# Patient Record
Sex: MALE | Race: WHITE | Employment: FULL TIME | URBAN - METROPOLITAN AREA
[De-identification: names, ages, dates, MRNs, and addresses within clinical notes are randomized per-mention and may not be internally consistent; named-entity substitution may affect disease eponyms.]

---

## 2017-01-13 ENCOUNTER — HOSPITAL ENCOUNTER (OUTPATIENT)
Dept: LAB | Age: 67
Discharge: HOME | End: 2017-01-13
Payer: MEDICARE

## 2017-01-13 ENCOUNTER — OFFICE VISIT (OUTPATIENT)
Dept: INTERNAL MEDICINE CLINIC | Age: 67
End: 2017-01-13

## 2017-01-13 VITALS
TEMPERATURE: 98.3 F | HEIGHT: 70 IN | DIASTOLIC BLOOD PRESSURE: 80 MMHG | SYSTOLIC BLOOD PRESSURE: 133 MMHG | BODY MASS INDEX: 25.05 KG/M2 | HEART RATE: 78 BPM | WEIGHT: 175 LBS | RESPIRATION RATE: 18 BRPM | OXYGEN SATURATION: 96 %

## 2017-01-13 DIAGNOSIS — K57.31 DIVERTICULOSIS OF COLON WITH HEMORRHAGE: Primary | ICD-10-CM

## 2017-01-13 DIAGNOSIS — Z72.0 TOBACCO ABUSE DISORDER: ICD-10-CM

## 2017-01-13 DIAGNOSIS — Z00.00 ROUTINE ADULT HEALTH MAINTENANCE: ICD-10-CM

## 2017-01-13 DIAGNOSIS — R06.02 SOB (SHORTNESS OF BREATH) ON EXERTION: Chronic | ICD-10-CM

## 2017-01-13 PROCEDURE — 36415 COLL VENOUS BLD VENIPUNCTURE: CPT

## 2017-01-13 PROCEDURE — 84153 ASSAY OF PSA TOTAL: CPT

## 2017-01-13 PROCEDURE — 85025 COMPLETE CBC W/AUTO DIFF WBC: CPT

## 2017-01-13 PROCEDURE — 86803 HEPATITIS C AB TEST: CPT

## 2017-01-13 RX ORDER — ASPIRIN 81 MG/1
81 TABLET ORAL DAILY
Qty: 30 TAB | Refills: 12
Start: 2017-01-13 | End: 2019-06-20

## 2017-01-13 RX ORDER — PANTOPRAZOLE SODIUM 40 MG/1
40 TABLET, DELAYED RELEASE ORAL DAILY
COMMUNITY
End: 2017-07-13 | Stop reason: ALTCHOICE

## 2017-01-13 NOTE — PATIENT INSTRUCTIONS
Learning About Benefits From Quitting Smoking  How does quitting smoking make you healthier? If you're thinking about quitting smoking, you may have a few reasons to be smoke-free. Your health may be one of them. · When you quit smoking, you lower your risks for cancer, lung disease, heart attack, stroke, blood vessel disease, and blindness from macular degeneration. · When you're smoke-free, you get sick less often, and you heal faster. You are less likely to get colds, flu, bronchitis, and pneumonia. · As a nonsmoker, you may find that your mood is better and you are less stressed. When and how will you feel healthier? Quitting has real health benefits that start from day 1 of being smoke-free. And the longer you stay smoke-free, the healthier you get and the better you feel. The first hours  · After just 20 minutes, your blood pressure and heart rate go down. That means there's less stress on your heart and blood vessels. · Within 12 hours, the level of carbon monoxide in your blood drops back to normal. That makes room for more oxygen. With more oxygen in your body, you may notice that you have more energy than when you smoked. After 2 weeks  · Your lungs start to work better. · Your risk of heart attack starts to drop. After 1 month  · When your lungs are clear, you cough less and breathe deeper, so it's easier to be active. · Your sense of taste and smell return. That means you can enjoy food more than you have since you started smoking. Over the years  · After 1 year, your risk of heart disease is half what it would be if you kept smoking. · After 5 years, your risk of stroke starts to shrink. Within a few years after that, it's about the same as if you'd never smoked. · After 10 years, your risk of dying from lung cancer is cut by about half. And your risk for many other types of cancer is lower too. How would quitting help others in your life?   When you quit smoking, you improve the health of everyone who now breathes in your smoke. · Their heart, lung, and cancer risks drop, much like yours. · They are sick less. For babies and small children, living smoke-free means they're less likely to have ear infections, pneumonia, and bronchitis. · If you're a woman who is or will be pregnant someday, quitting smoking means a healthier . · Children who are close to you are less likely to become adult smokers. Where can you learn more? Go to http://caprice-erich.info/. Enter [id number] [id number] 72 11 in the search box to learn more about \"Learning About Benefits From Quitting Smoking. \"  Current as of: May 26, 2016  Content Version: 11.1  © 9746-7154 Applied Identity. Care instructions adapted under license by [employer] (which disclaims liability or warranty for this information). If you have questions about a medical condition or this instruction, always ask your healthcare professional. [place] [address] any warranty or liability for your use of this information. Diverticulosis: Care Instructions  Your Care Instructions  In diverticulosis, pouches called diverticula form in the wall of the large intestine (colon). The pouches do not cause any pain or other symptoms. Most people who have diverticulosis do not know they have it. But the pouches sometimes bleed, and if they become infected, they can cause pain and other symptoms. When this happens, it is called diverticulitis. Diverticula form when pressure pushes the wall of the colon outward at certain weak points. A diet that is too low in fiber can cause diverticula. Follow-up care is a key part of your treatment and safety. Be sure to make and go to all appointments, and call your doctor if you are having problems. It's also a good idea to know your test results and keep a list of the medicines you take. How can you care for yourself at home?   · Include fruits, leafy green vegetables, beans, and whole grains in your diet each day. These foods are high in fiber. · Take a fiber supplement, such as Citrucel or Metamucil, every day if needed. Read and follow all instructions on the label. · Drink plenty of fluids, enough so that your urine is light yellow or clear like water. If you have kidney, heart, or liver disease and have to limit fluids, talk with your doctor before you increase the amount of fluids you drink. · Get at least 30 minutes of exercise on most days of the week. Walking is a good choice. You also may want to do other activities, such as running, swimming, cycling, or playing tennis or team sports. · Cut out foods that cause gas, pain, or other symptoms. When should you call for help? Call your doctor now or seek immediate medical care if:  · You have belly pain. · You pass maroon or very bloody stools. · You have a fever. · You have nausea and vomiting. · You have unusual changes in your bowel movements or abdominal swelling. · You have burning pain when you urinate. · You have abnormal vaginal discharge. · You have shoulder pain. · You have cramping pain that does not get better when you have a bowel movement or pass gas. · You pass gas or stool from your urethra while urinating. Watch closely for changes in your health, and be sure to contact your doctor if you have any problems. Where can you learn more? Go to http://caprice-erich.info/. Enter Z969 in the search box to learn more about \"Diverticulosis: Care Instructions. \"  Current as of: August 9, 2016  Content Version: 11.1  © 6602-0136 Pinger. Care instructions adapted under license by Sling (which disclaims liability or warranty for this information). If you have questions about a medical condition or this instruction, always ask your healthcare professional. [address] any warranty or liability for your use of this information.

## 2017-01-13 NOTE — LETTER
1/17/2017 1:43 PM 
 
Mr. Kandi Coon [address] [address] Dear Kandi Coon: 
 
Please find your most recent results below. Resulted Orders CBC WITH AUTOMATED DIFF Result Value Ref Range WBC 6.7 3.4 - 10.8 x10E3/uL  
 RBC 4.26 4.14 - 5.80 x10E6/uL HGB 12.7 12.6 - 17.7 g/dL HCT 39.8 37.5 - 51.0 % MCV 93 79 - 97 fL  
 MCH 29.8 26.6 - 33.0 pg  
 MCHC 31.9 31.5 - 35.7 g/dL  
 RDW 14.3 12.3 - 15.4 % PLATELET 910 235 - 437 x10E3/uL NEUTROPHILS 74 % Lymphocytes 17 % MONOCYTES 6 % EOSINOPHILS 3 % BASOPHILS 0 %  
 ABS. NEUTROPHILS 5.0 1.4 - 7.0 x10E3/uL Abs Lymphocytes 1.1 0.7 - 3.1 x10E3/uL  
 ABS. MONOCYTES 0.4 0.1 - 0.9 x10E3/uL  
 ABS. EOSINOPHILS 0.2 0.0 - 0.4 x10E3/uL  
 ABS. BASOPHILS 0.0 0.0 - 0.2 x10E3/uL IMMATURE GRANULOCYTES 0 %  
 ABS. IMM. GRANS. 0.0 0.0 - 0.1 x10E3/uL Narrative Performed at:  [address]  [id number] : Mary Lou Ewing MD, Phone:  [phone] HEPATITIS C AB Result Value Ref Range Hep C Virus Ab <0.1 0.0 - 0.9 s/co ratio Comment:  
                                     Negative:     < 0.8 Indeterminate: 0.8 - 0.9 Positive:     > 0.9 The CDC recommends that a positive HCV antibody result 
 be followed up with a HCV Nucleic Acid Amplification 
 test (470441). Narrative Performed at:  [address]  [id number] : Mary Lou Ewing MD, Phone:  [phone] PSA DIAGNOSTIC (PROSTATIC SPECIFIC AG) Result Value Ref Range Prostate Specific Ag 4.6 (H) 0.0 - 4.0 ng/mL Comment:  
   Roche ECLIA methodology. According to the American Urological Association, Serum PSA should 
decrease and remain at undetectable levels after radical 
prostatectomy.  The AUA defines biochemical recurrence as an initial 
 PSA value 0.2 ng/mL or greater followed by a subsequent confirmatory PSA value 0.2 ng/mL or greater. Values obtained with different assay methods or kits cannot be used 
interchangeably. Results cannot be interpreted as absolute evidence 
of the presence or absence of malignant disease. Narrative Performed at:  [address]  [id number] : Aubrie Crawford MD, Phone:  [phone] RECOMMENDATIONS: 
Anemia has resolved. Labs look good, except prostate test just bit elevated.  Would check again in 6 months or so. Please call me if you have any questions: [phone] Sincerely, Camila Grullon III, DO

## 2017-01-13 NOTE — PROGRESS NOTES
Reviewed record in preparation for visit and have obtained necessary documentation. Identified pt with two pt identifiers(name and ). Chief Complaint   Patient presents with   [facility] Follow Up       Health Maintenance Due   Topic Date Due    Hepatitis C Screening  1950    DTaP/Tdap/Td series (1 - Tdap) 1969    ZOSTER VACCINE AGE 60>  2008    GLAUCOMA SCREENING Q2Y  2013    Pneumococcal 65+ Low/Medium Risk (1 of 2 - PCV13) 2013    MEDICARE YEARLY EXAM  2013    INFLUENZA AGE 9 TO ADULT  2016       Mr. Mookie Cervantes has a reminder for a \"due or due soon\" health maintenance. I have asked that he discuss health maintenance topic(s) due with His  primary care provider. Coordination of Care Questionnaire:  :     1) Have you been to an emergency room, urgent care clinic since your last visit? yes   Hospitalized since your last visit? Yes             2) Have you seen or consulted any other health care providers outside of [address] [place] [address] since your last visit? no  (Include any pap smears or colon screenings in this section.)      Patient is accompanied by self I have received verbal consent from Millicent Fay to discuss any/all medical information while they are present in the room.

## 2017-01-13 NOTE — PROGRESS NOTES
Fernandez Brooks is a 74 y.o. male who presents for evaluation of new pt visit. Would go to pt first when needed. Was inpt most recently [facility] from dec 5-8 with diverticular bleed, which was his 3rd admit over past 2 months for same. Has not had any further bleeding since last discharge. Has been having increased sob/solo for past year or so. Long time smoker. Denies any chest pains. 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 i have been taking care of his wife, so he decided to establish.       ROS:  Constitutional: negative for fevers, chills, anorexia and weight loss  Eyes:   negative for visual disturbance and irritation  ENT:   negative for tinnitus,sore throat,nasal congestion,ear pain,hoarseness  Respiratory:  negative for cough, hemoptysis, dyspnea,wheezing  CV:   negative for chest pain, palpitations, lower extremity edema  GI:   negative for nausea, vomiting, diarrhea, abdominal pain,melena  Genitourinary: negative for frequency, dysuria and hematuria  Musculoskel: negative for myalgias, arthralgias, back pain, muscle weakness, joint pain  Neurological:  negative for headaches, dizziness, focal weakness, numbness  Psychiatric:     Negative for depression or anxiety      Past Medical History   Diagnosis Date    Diverticulitis     Hypertension     Ill-defined condition      kidney stones/ stant placed       Past Surgical History   Procedure Laterality Date    Hx urological       kidney stone extraction/stent    Hx tonsillectomy      Hx cervical fusion      Hx orthopaedic       left ring finger-trauma    Hx orthopaedic       right index finger    Hx back surgery       plate to upper neck    Hx orthopaedic  12/2/14     Bilateral total knee replacements    Colonoscopy,tracy mena/cautery  11/30/2016          Colonoscopy N/A 11/30/2016     COLONOSCOPY performed by Letty Cerna MD at [facility] ENDOSCOPY       Family History   Problem Relation Age of Onset    No Known Problems Mother     Emphysema Father        Social History     Social History    Marital status: UNKNOWN     Spouse name: N/A    Number of children: N/A    Years of education: N/A     Occupational History    Not on file. Social History Main Topics    Smoking status: Current Every Day Smoker     Packs/day: 0.25     Years: 50.00     Types: Cigarettes    Smokeless tobacco: Never Used      Comment: electronic cigarettes in the past    Alcohol use 1.8 oz/week     3 Cans of beer per week      Comment: social -3 drinks weekly--rum    Drug use: No    Sexual activity: Yes     Partners: Female     Other Topics Concern    Not on file     Social History Narrative            Visit Vitals    /80 (BP 1 Location: Right arm, BP Patient Position: Sitting)    Pulse 78    Temp 98.3 °F (36.8 °C) (Oral)    Resp 18    Ht 5' 10\" (1.778 m)    Wt 175 lb (79.4 kg)    SpO2 96%    BMI 25.11 kg/m2       Physical Examination:   General - Well appearing male  HEENT - PERRL, TM no erythema/opacification, normal nasal turbinates, no oropharyngeal erythema or exudate, MMM  Neck - supple, no bruits, no thyroidomegaly, no lymphadenopathy  Pulm - clear to auscultation bilaterally  Cardio - RRR, normal S1 S2, no murmur  Abd - soft, nontender, no masses, no HSM  Extrem - no edema, +2 distal pulses  Neuro-  No focal deficits, CN intact     Assessment/Plan:    1. Acute blood loss anemia from diverticular bleed--check cbc  2. Tobacco abuse--using patch now. Down to 6 cigarettes at most daily (had been 2 ppd)  3. Sob/solo--given his tobacco history, bit worrisome for cad. Advised to start 81 mg asa daily. Referral to cardio dr Milli Huber. 4.  Routine adult health maintenance--declines any immunizations.   Check hcv    rtc 6 months        Chai Clark III DO

## 2017-01-13 NOTE — MR AVS SNAPSHOT
Visit Information Date & Time Provider Department Dept. Phone Encounter #  
 1/13/2017  1:30 PM Duwayne Boast, [address]  [id number] Follow-up Instructions Return in about 6 months (around 7/13/2017). Upcoming Health Maintenance Date Due Hepatitis C Screening 1950 DTaP/Tdap/Td series (1 - Tdap) 8/28/1969 ZOSTER VACCINE AGE 60> 8/28/2008 GLAUCOMA SCREENING Q2Y 8/28/2013 Pneumococcal 65+ Low/Medium Risk (1 of 2 - PCV13) 8/28/2013 MEDICARE YEARLY EXAM 8/28/2013 COLONOSCOPY 11/30/2021 Allergies as of 1/13/2017  Review Complete On: 1/13/2017 By: Arjun Estrella III, DO No Known Allergies Current Immunizations  Reviewed on 12/2/2014 No immunizations on file. Not reviewed this visit You Were Diagnosed With   
  
 Codes Comments Diverticulosis of colon with hemorrhage    -  Primary ICD-10-CM: K57.31 
ICD-9-CM: 562.12 Tobacco abuse disorder     ICD-10-CM: Z72.0 ICD-9-CM: 305.1 Routine adult health maintenance     ICD-10-CM: Z00.00 ICD-9-CM: V70.0   
 SOB (shortness of breath) on exertion     ICD-10-CM: R06.02 
ICD-9-CM: 786.05 Vitals BP Pulse Temp Resp Height(growth percentile) Weight(growth percentile) 133/80 (BP 1 Location: Right arm, BP Patient Position: Sitting) 78 98.3 °F (36.8 °C) (Oral) 18 5' 10\" (1.778 m) 175 lb (79.4 kg) SpO2 BMI Smoking Status 96% 25.11 kg/m2 Current Every Day Smoker Vitals History BMI and BSA Data Body Mass Index Body Surface Area  
 25.11 kg/m 2 1.98 m 2 Preferred Pharmacy Pharmacy Name Phone [phone] [phone] Your Updated Medication List  
  
   
This list is accurate as of: 1/13/17  2:04 PM.  Always use your most recent med list.  
  
  
  
  
 acetaminophen 325 mg tablet Commonly known as:  TYLENOL  
 Take 2 Tabs by mouth every six (6) hours as needed. aspirin delayed-release 81 mg tablet Take 1 Tab by mouth daily. pantoprazole 40 mg tablet Commonly known as:  PROTONIX Take 40 mg by mouth daily. We Performed the Following CBC WITH AUTOMATED DIFF [92677 CPT(R)] HEPATITIS C AB [94268 CPT(R)] PSA DIAGNOSTIC (PROSTATIC SPECIFIC AG) [id number] CPT(R)] REFERRAL TO CARDIOLOGY [VCQ67 Custom] Follow-up Instructions Return in about 6 months (around 7/13/2017). Referral Information Referral ID Referred By Referred To  
  
 [id number] [address], [address] [facility], [place]   
   [address], [facility] [address] Phone: [phone] Fax: [phone] Visits Status Start Date End Date 1 New Request 1/13/17 1/13/18 If your referral has a status of pending review or denied, additional information will be sent to support the outcome of this decision. Patient Instructions Learning About Benefits From Quitting Smoking How does quitting smoking make you healthier? If you're thinking about quitting smoking, you may have a few reasons to be smoke-free. Your health may be one of them. · When you quit smoking, you lower your risks for cancer, lung disease, heart attack, stroke, blood vessel disease, and blindness from macular degeneration. · When you're smoke-free, you get sick less often, and you heal faster. You are less likely to get colds, flu, bronchitis, and pneumonia. · As a nonsmoker, you may find that your mood is better and you are less stressed. When and how will you feel healthier? Quitting has real health benefits that start from day 1 of being smoke-free. And the longer you stay smoke-free, the healthier you get and the better you feel. The first hours · After just 20 minutes, your blood pressure and heart rate go down. That means there's less stress on your heart and blood vessels. · Within 12 hours, the level of carbon monoxide in your blood drops back to normal. That makes room for more oxygen. With more oxygen in your body, you may notice that you have more energy than when you smoked. After 2 weeks · Your lungs start to work better. · Your risk of heart attack starts to drop. After 1 month · When your lungs are clear, you cough less and breathe deeper, so it's easier to be active. · Your sense of taste and smell return. That means you can enjoy food more than you have since you started smoking. Over the years · After 1 year, your risk of heart disease is half what it would be if you kept smoking. · After 5 years, your risk of stroke starts to shrink. Within a few years after that, it's about the same as if you'd never smoked. · After 10 years, your risk of dying from lung cancer is cut by about half. And your risk for many other types of cancer is lower too. How would quitting help others in your life? When you quit smoking, you improve the health of everyone who now breathes in your smoke. · Their heart, lung, and cancer risks drop, much like yours. · They are sick less. For babies and small children, living smoke-free means they're less likely to have ear infections, pneumonia, and bronchitis. · If you're a woman who is or will be pregnant someday, quitting smoking means a healthier . · Children who are close to you are less likely to become adult smokers. Where can you learn more? Go to http://caprice-erich.info/. Enter [id number] in the search box to learn more about \"Learning About Benefits From Quitting Smoking. \" Current as of: May 26, 2016 Content Version: 11.1 © 1893-3365 iCents.net. Care instructions adapted under license by [employer] (which disclaims liability or warranty for this information).  If you have questions about a medical condition or this instruction, always ask your healthcare professional. [address] any warranty or liability for your use of this information. Diverticulosis: Care Instructions Your Care Instructions In diverticulosis, pouches called diverticula form in the wall of the large intestine (colon). The pouches do not cause any pain or other symptoms. Most people who have diverticulosis do not know they have it. But the pouches sometimes bleed, and if they become infected, they can cause pain and other symptoms. When this happens, it is called diverticulitis. Diverticula form when pressure pushes the wall of the colon outward at certain weak points. A diet that is too low in fiber can cause diverticula. Follow-up care is a key part of your treatment and safety. Be sure to make and go to all appointments, and call your doctor if you are having problems. It's also a good idea to know your test results and keep a list of the medicines you take. How can you care for yourself at home? · Include fruits, leafy green vegetables, beans, and whole grains in your diet each day. These foods are high in fiber. · Take a fiber supplement, such as Citrucel or Metamucil, every day if needed. Read and follow all instructions on the label. · Drink plenty of fluids, enough so that your urine is light yellow or clear like water. If you have kidney, heart, or liver disease and have to limit fluids, talk with your doctor before you increase the amount of fluids you drink. · Get at least 30 minutes of exercise on most days of the week. Walking is a good choice. You also may want to do other activities, such as running, swimming, cycling, or playing tennis or team sports. · Cut out foods that cause gas, pain, or other symptoms. When should you call for help? Call your doctor now or seek immediate medical care if: 
· You have belly pain. · You pass maroon or very bloody stools. · You have a fever. · You have nausea and vomiting. · You have unusual changes in your bowel movements or abdominal swelling. · You have burning pain when you urinate. · You have abnormal vaginal discharge. · You have shoulder pain. · You have cramping pain that does not get better when you have a bowel movement or pass gas. · You pass gas or stool from your urethra while urinating. Watch closely for changes in your health, and be sure to contact your doctor if you have any problems. Where can you learn more? Go to http://caprice-erich.info/. Enter I502 in the search box to learn more about \"Diverticulosis: Care Instructions. \" Current as of: August 9, 2016 Content Version: 11.1 © 1938-8105 DataPad. Care instructions adapted under license by [employer] (which disclaims liability or warranty for this information). If you have questions about a medical condition or this instruction, always ask your healthcare professional. [address] any warranty or liability for your use of this information. Introducing \[facility]\"" & HEALTH SERVICES! Smitha Beaver introduces KnotProfit patient portal. Now you can access parts of your medical record, email your doctor's office, and request medication refills online. 1. In your internet browser, go to [email or web address] 2. Click on the First Time User? Click Here link in the Sign In box. You will see the New Member Sign Up page. 3. Enter your KnotProfit Access Code exactly as it appears below. You will not need to use this code after youve completed the sign-up process. If you do not sign up before the expiration date, you must request a new code. · KnotProfit Access Code: [email or web address] Expires: 2/28/2017  6:01 PM 
 
4. Enter the last four digits of your Social Security Number (xxxx) and Date of Birth (mm/dd/yyyy) as indicated and click Submit. You will be taken to the next sign-up page. 5. Create a BiiCode ID. This will be your BiiCode login ID and cannot be changed, so think of one that is secure and easy to remember. 6. Create a BiiCode password. You can change your password at any time. 7. Enter your Password Reset Question and Answer. This can be used at a later time if you forget your password. 8. Enter your e-mail address. You will receive e-mail notification when new information is available in [address] 9. Click Sign Up. You can now view and download portions of your medical record. 10. Click the Download Summary menu link to download a portable copy of your medical information. If you have questions, please visit the Frequently Asked Questions section of the BiiCode website. Remember, BiiCode is NOT to be used for urgent needs. For medical emergencies, dial 911. Now available from your iPhone and Android! Please provide this summary of care documentation to your next provider. Your primary care clinician is listed as Jeff Camp If you have any questions after today's visit, please call [phone].

## 2017-01-14 LAB
BASOPHILS # BLD AUTO: 0 X10E3/UL (ref 0–0.2)
BASOPHILS NFR BLD AUTO: 0 %
EOSINOPHIL # BLD AUTO: 0.2 X10E3/UL (ref 0–0.4)
EOSINOPHIL NFR BLD AUTO: 3 %
ERYTHROCYTE [DISTWIDTH] IN BLOOD BY AUTOMATED COUNT: 14.3 % (ref 12.3–15.4)
HCT VFR BLD AUTO: 39.8 % (ref 37.5–51)
HCV AB S/CO SERPL IA: <0.1 S/CO RATIO (ref 0–0.9)
HGB BLD-MCNC: 12.7 G/DL (ref 12.6–17.7)
IMM GRANULOCYTES # BLD: 0 X10E3/UL (ref 0–0.1)
IMM GRANULOCYTES NFR BLD: 0 %
LYMPHOCYTES # BLD AUTO: 1.1 X10E3/UL (ref 0.7–3.1)
LYMPHOCYTES NFR BLD AUTO: 17 %
MCH RBC QN AUTO: 29.8 PG (ref 26.6–33)
MCHC RBC AUTO-ENTMCNC: 31.9 G/DL (ref 31.5–35.7)
MCV RBC AUTO: 93 FL (ref 79–97)
MONOCYTES # BLD AUTO: 0.4 X10E3/UL (ref 0.1–0.9)
MONOCYTES NFR BLD AUTO: 6 %
NEUTROPHILS # BLD AUTO: 5 X10E3/UL (ref 1.4–7)
NEUTROPHILS NFR BLD AUTO: 74 %
PLATELET # BLD AUTO: 240 X10E3/UL (ref 150–379)
PSA SERPL-MCNC: 4.6 NG/ML (ref 0–4)
RBC # BLD AUTO: 4.26 X10E6/UL (ref 4.14–5.8)
WBC # BLD AUTO: 6.7 X10E3/UL (ref 3.4–10.8)

## 2017-01-15 NOTE — PROGRESS NOTES
Anemia has resolved. Labs look good, except prostate test just bit elevated. Would check again in 6 months or so.

## 2017-02-01 ENCOUNTER — OFFICE VISIT (OUTPATIENT)
Dept: CARDIOLOGY CLINIC | Age: 67
End: 2017-02-01

## 2017-02-01 VITALS
RESPIRATION RATE: 16 BRPM | BODY MASS INDEX: 25.48 KG/M2 | OXYGEN SATURATION: 93 % | SYSTOLIC BLOOD PRESSURE: 142 MMHG | HEIGHT: 70 IN | WEIGHT: 178 LBS | DIASTOLIC BLOOD PRESSURE: 90 MMHG | HEART RATE: 76 BPM

## 2017-02-01 DIAGNOSIS — Z72.0 TOBACCO ABUSE DISORDER: ICD-10-CM

## 2017-02-01 DIAGNOSIS — I10 ESSENTIAL HYPERTENSION: ICD-10-CM

## 2017-02-01 DIAGNOSIS — R06.02 SOB (SHORTNESS OF BREATH) ON EXERTION: Primary | Chronic | ICD-10-CM

## 2017-02-01 DIAGNOSIS — R07.89 CHEST PAIN, ATYPICAL: ICD-10-CM

## 2017-02-01 RX ORDER — ATORVASTATIN CALCIUM 20 MG/1
20 TABLET, FILM COATED ORAL DAILY
Qty: 90 TAB | Refills: 3 | Status: SHIPPED | OUTPATIENT
Start: 2017-02-01 | End: 2019-06-20

## 2017-02-01 NOTE — PROGRESS NOTES
Chief Complaint   Patient presents with    New Patient     C/O SOB on exertion and achy chest at times

## 2017-02-01 NOTE — PROGRESS NOTES
Subjective/HPI:     Aminta Pichardo is a 74 y.o. male is here for new patient consultation. The patient has medical hx significant for htn, diverticulosis, and tobacco use. The pt presents with c/o worsening sob with exertion. Now happens with inclines and stairs when it never bothered him in the past. He is also having episodes when trying to do yardwork. He has been blaming it on his smoking. He has been weaning off cig, trying to quit. He gets some chest discomfort right midsternal at times. He reports it occurs randomly, not necessarily exertionally related. Symptoms started in the past year, gradually getting worse. Previous cardiac evaluation includes stress test over 20 years ago. Family med hx significant for lung disease.     Patient Active Problem List    Diagnosis Date Noted    SOB (shortness of breath) on exertion 01/13/2017    Diverticulosis 12/08/2016    Diverticulosis of colon with hemorrhage 12/06/2016     Class: Present on Admission    Tobacco abuse disorder 11/28/2016     Class: Chronic    Alcohol use 11/28/2016     Class: Chronic    Long term (current) use of non-steroidal anti-inflammatories (nsaid) 11/28/2016     Class: Present on Admission    DJD (degenerative joint disease) of knee 12/02/2014    Primary localized osteoarthrosis, lower leg 12/02/2014      Kobe Jameson III, DO  Past Medical History   Diagnosis Date    Diverticulitis     Hypertension     Ill-defined condition      kidney stones/ stant placed      Past Surgical History   Procedure Laterality Date    Hx urological       kidney stone extraction/stent    Hx tonsillectomy      Hx cervical fusion      Hx orthopaedic       left ring finger-trauma    Hx orthopaedic       right index finger    Hx back surgery       plate to upper neck    Hx orthopaedic  12/2/14     Bilateral total knee replacements    Colonoscopy,tracy mena/alo  11/30/2016          Colonoscopy N/A 11/30/2016     COLONOSCOPY performed by Ray Rivas MD at hospitals ENDOSCOPY     No Known Allergies   Family History   Problem Relation Age of Onset    No Known Problems Mother     Emphysema Father       Current Outpatient Prescriptions   Medication Sig    atorvastatin (LIPITOR) 20 mg tablet Take 1 Tab by mouth daily.  aspirin delayed-release 81 mg tablet Take 1 Tab by mouth daily.  acetaminophen (TYLENOL) 325 mg tablet Take 2 Tabs by mouth every six (6) hours as needed.  pantoprazole (PROTONIX) 40 mg tablet Take 40 mg by mouth daily. No current facility-administered medications for this visit. Vitals:    02/01/17 1340 02/01/17 1353   BP: 138/90 142/90   Pulse: 76    Resp: 16    SpO2: 93%    Weight: 178 lb (80.7 kg)    Height: 5' 10\" (1.778 m)        I have reviewed the nurses notes, vitals, problem list, allergy list, medical history, family, social history and medications. Review of Symptoms:  General: Pt denies excessive weight gain or loss. Pt is able to conduct ADL's  HEENT: Denies blurred vision, headaches, epistaxis and difficulty swallowing. Respiratory: Denies shortness of breath, +ROSARIO, denies wheezing or stridor. Cardiovascular: + precordial pain, denies palpitations, edema or PND  Gastrointestinal: Denies poor appetite, indigestion, abdominal pain or blood in stool  Urinary: Denies dysuria, pyuria  Musculoskeletal: Denies pain or swelling from muscles or joints  Neurologic: Denies tremor, paresthesias, or sensory motor disturbance  Skin: Denies rash, itching or texture change. Psych: Denies depression        Physical Exam:      General: Well developed, cooperative, alert in no acute distress, appears states age. HEENT: Supple, No carotid bruits, no JVD, trach is midline. PERRL, EOM intact  Heart:  Normal S1/S2 negative S3 or S4.  Regular, no murmur, gallop or rub.   Respiratory: Clear bilaterally x 4, no wheezing or rales  Abdomen:   Soft, non-tender, no masses, bowel sounds are active.   Extremities:  No edema, normal cap refill, no cyanosis, atraumatic. Neuro: A&Ox3, speech clear, gait stable. Skin: Skin color is normal. No rashes or lesions. Non diaphoretic  Vascular: 2+ pulses symmetric in all extremities    Cardiographics    ECG:Sinus  Rhythm   -Left axis -anterior fascicular block. Results for orders placed or performed during the hospital encounter of 12/05/16   EKG, 12 LEAD, INITIAL   Result Value Ref Range    Ventricular Rate 75 BPM    Atrial Rate 75 BPM    P-R Interval 148 ms    QRS Duration 90 ms    Q-T Interval 384 ms    QTC Calculation (Bezet) 428 ms    Calculated P Axis 67 degrees    Calculated R Axis -44 degrees    Calculated T Axis 54 degrees    Diagnosis       Normal sinus rhythm  Left axis deviation  Abnormal ECG  No previous ECGs available  Confirmed by Josefina Gutierrez MD, [facility] ([id number]) on 12/6/2016 2:45:06 PM           Cardiology Labs:  Lab Results   Component Value Date/Time    Cholesterol, total 130 12/03/2016 06:03 AM    HDL Cholesterol 46 12/03/2016 06:03 AM    LDL, calculated 68.4 12/03/2016 06:03 AM    Triglyceride 78 12/03/2016 06:03 AM    CHOL/HDL Ratio 2.8 12/03/2016 06:03 AM       Lab Results   Component Value Date/Time    Sodium 142 12/08/2016 03:21 AM    Potassium 3.8 12/08/2016 03:21 AM    Chloride 107 12/08/2016 03:21 AM    CO2 32 12/08/2016 03:21 AM    Anion gap 3 12/08/2016 03:21 AM    Glucose 97 12/08/2016 03:21 AM    BUN 15 12/08/2016 03:21 AM    Creatinine 1.13 12/08/2016 03:21 AM    BUN/Creatinine ratio 13 12/08/2016 03:21 AM    GFR est AA >60 12/08/2016 03:21 AM    GFR est non-AA >60 12/08/2016 03:21 AM    Calcium 7.9 12/08/2016 03:21 AM    AST (SGOT) 19 12/05/2016 06:50 PM    Alk.  phosphatase 74 12/05/2016 06:50 PM    Protein, total 6.0 12/05/2016 06:50 PM    Albumin 3.1 12/05/2016 06:50 PM    Globulin 2.9 12/05/2016 06:50 PM    A-G Ratio 1.1 12/05/2016 06:50 PM    ALT (SGPT) 17 12/05/2016 06:50 PM           Assessment:     Assessment:      Ingrid Pelaez was seen today for new patient. Diagnoses and all orders for this visit:    SOB (shortness of breath) on exertion  -     AMB POC EKG ROUTINE W/ 12 LEADS, INTER & REP  -     2D ECHO COMPLETE ADULT (TTE) W OR WO CONTR; Future  -     ECHO TTE STRESS EXRCSE COMP W OR WO CONTR; Future  -     LIPID PANEL; Future  -     METABOLIC PANEL, COMPREHENSIVE; Future  -     CK; Future    Chest pain, atypical  -     AMB POC EKG ROUTINE W/ 12 LEADS, INTER & REP  -     2D ECHO COMPLETE ADULT (TTE) W OR WO CONTR; Future  -     ECHO TTE STRESS EXRCSE COMP W OR WO CONTR; Future  -     LIPID PANEL; Future  -     METABOLIC PANEL, COMPREHENSIVE; Future  -     CK; Future    Essential hypertension  -     AMB POC EKG ROUTINE W/ 12 LEADS, INTER & REP  -     2D ECHO COMPLETE ADULT (TTE) W OR WO CONTR; Future  -     ECHO TTE STRESS EXRCSE COMP W OR WO CONTR; Future  -     LIPID PANEL; Future  -     METABOLIC PANEL, COMPREHENSIVE; Future  -     CK; Future    Tobacco abuse disorder  -     AMB POC EKG ROUTINE W/ 12 LEADS, INTER & REP  -     2D ECHO COMPLETE ADULT (TTE) W OR WO CONTR; Future  -     ECHO TTE STRESS EXRCSE COMP W OR WO CONTR; Future  -     LIPID PANEL; Future  -     METABOLIC PANEL, COMPREHENSIVE; Future  -     CK; Future    Other orders  -     atorvastatin (LIPITOR) 20 mg tablet; Take 1 Tab by mouth daily. Specialty Problems     None          ICD-10-CM ICD-9-CM    1. SOB (shortness of breath) on exertion R06.02 786.05 AMB POC EKG ROUTINE W/ 12 LEADS, INTER & REP      2D ECHO COMPLETE ADULT (TTE) W OR WO CONTR      ECHO TTE STRESS EXRCSE COMP W OR WO CONTR      LIPID PANEL      METABOLIC PANEL, COMPREHENSIVE      CK   2. Chest pain, atypical R07.89 786.59 AMB POC EKG ROUTINE W/ 12 LEADS, INTER & REP      2D ECHO COMPLETE ADULT (TTE) W OR WO CONTR      ECHO TTE STRESS EXRCSE COMP W OR WO CONTR      LIPID PANEL      METABOLIC PANEL, COMPREHENSIVE      CK   3.  Essential hypertension I10 401.9 AMB POC EKG ROUTINE W/ 12 LEADS, INTER & REP      2D ECHO COMPLETE ADULT (TTE) W OR WO CONTR      ECHO TTE STRESS EXRCSE COMP W OR WO CONTR      LIPID PANEL      METABOLIC PANEL, COMPREHENSIVE      CK   4. Tobacco abuse disorder Z72.0 305.1 AMB POC EKG ROUTINE W/ 12 LEADS, INTER & REP      2D ECHO COMPLETE ADULT (TTE) W OR WO CONTR      ECHO TTE STRESS EXRCSE COMP W OR WO CONTR      LIPID PANEL      METABOLIC PANEL, COMPREHENSIVE      CK     Orders Placed This Encounter    LIPID PANEL     Standing Status:   Future     Standing Expiration Date:   7/7/0857    METABOLIC PANEL, COMPREHENSIVE     Standing Status:   Future     Standing Expiration Date:   6/1/2017    CK     Standing Status:   Future     Standing Expiration Date:   6/1/2017    AMB POC EKG ROUTINE W/ 12 LEADS, INTER & REP     Order Specific Question:   Reason for Exam:     Answer:   routine    2D ECHO COMPLETE ADULT (TTE) W OR WO CONTR     Standing Status:   Future     Standing Expiration Date:   8/1/2017     Order Specific Question:   Reason for Exam:     Answer:   ROSARIO     Order Specific Question:   Contrast Enhancement (Bubble Study, Definity, Optison) may be used if criteria listed in established evidence-based protocol has been identified. Answer: Yes    ECHO TTE STRESS EXRCSE COMP W OR WO CONTR     Standing Status:   Future     Standing Expiration Date:   8/1/2017     Order Specific Question:   Reason for Exam:     Answer:   ROSARIO, with pulse ox     Order Specific Question:   Contrast Enhancement (Bubble Study, Definity, Optison) may be used if criteria listed in established evidence-based protocol has been identified. Answer: Yes    atorvastatin (LIPITOR) 20 mg tablet     Sig: Take 1 Tab by mouth daily. Dispense:  90 Tab     Refill:  3       PLAN:    Patient presents with worsening ROSARIO and atypical CP. He has hx htn and tobacco use. EKG SR with Left axis -anterior fascicular block. BP normotensive. I will evaluate with a stress echo with pulse ox and an echo.     Lipids:  10 year risk of MI or CVA by the [facility] calculator is 19%. Start Lipitor 20 mg.  Repeat labs in 3 months. If testing is normal, I recommend he see pulmonary associates for pulmonary function testing as he has some wheezing and follow-up with me in 6 months.     Adiel Michele MD

## 2017-02-01 NOTE — MR AVS SNAPSHOT
Visit Information Date & Time Provider Department Dept. Phone Encounter #  
 2/1/2017  1:45 PM Aletta Rinne, [address] [facility] [phone] [id number] Your Appointments 2/2/2017  3:30 PM  
ECHO CARDIOGRAMS 2D with [address]  [facility]) Appt Note: Per Dr Ailin Moore $0CP REM 5'10\", 178LBS. 2D ECHO COMPLETE ADULT (TTE) W OR WO CONTR [40007 CPT(R)] ECHO TTE STRESS EXRCSE COMP W OR WO CONTR [75648 Custom] [address]  
[phone] 9[address] [address]  
  
    
 2/6/2017  3:30 PM  
STRESS ECHOCARDIOGRAMS with [facility]) Appt Note: Per Dr Ailin Moore $0CP REM 5'10\", 178LBS. 2D ECHO COMPLETE ADULT (TTE) W OR WO CONTR [28924 CPT(R)] ECHO TTE STRESS EXRCSE COMP W OR WO CONTR [67630 Custom] [address]  
[phone] [address] [place] [address]  
  
    
 7/13/2017  9:00 AM  
ROUTINE CARE with DO EBER Dickson III [facility]) Appt Note: 6 month follow up  
 [address] [facility] [address] [address]  
[address] [place] [address] [address] [place] [address] Upcoming Health Maintenance Date Due  
 GLAUCOMA SCREENING Q2Y 8/28/2013 MEDICARE YEARLY EXAM 8/28/2013 Pneumococcal 65+ Low/Medium Risk (2 of 2 - PPSV23) 1/13/2018 COLONOSCOPY 11/30/2021 DTaP/Tdap/Td series (2 - Td) 1/13/2027 Allergies as of 2/1/2017  Review Complete On: 2/1/2017 By: Aletta Rinne, MD  
 No Known Allergies Current Immunizations  Reviewed on 12/2/2014 No immunizations on file. Not reviewed this visit You Were Diagnosed With   
  
 Codes Comments  SOB (shortness of breath) on exertion    -  Primary ICD-10-CM: R06.02 
ICD-9-CM: 786.05   
 Chest pain, atypical     ICD-10-CM: R07.89 ICD-9-CM: 786.59 Essential hypertension     ICD-10-CM: I10 
ICD-9-CM: 401.9 Tobacco abuse disorder     ICD-10-CM: Z72.0 ICD-9-CM: 305.1 Vitals BP Pulse Resp Height(growth percentile) Weight(growth percentile) SpO2  
 142/90 (BP 1 Location: Right arm, BP Patient Position: Sitting) 76 16 5' 10\" (1.778 m) 178 lb (80.7 kg) 93% BMI Smoking Status 25.54 kg/m2 Current Every Day Smoker Vitals History BMI and BSA Data Body Mass Index Body Surface Area 25.54 kg/m 2 2 m 2 Preferred Pharmacy Pharmacy Name Phone [address], [place] [address] [phone] Your Updated Medication List  
  
   
This list is accurate as of: 2/1/17  2:35 PM.  Always use your most recent med list.  
  
  
  
  
 acetaminophen 325 mg tablet Commonly known as:  TYLENOL Take 2 Tabs by mouth every six (6) hours as needed. aspirin delayed-release 81 mg tablet Take 1 Tab by mouth daily. atorvastatin 20 mg tablet Commonly known as:  LIPITOR Take 1 Tab by mouth daily. pantoprazole 40 mg tablet Commonly known as:  PROTONIX Take 40 mg by mouth daily. Prescriptions Sent to Pharmacy Refills  
 atorvastatin (LIPITOR) 20 mg tablet 3 Sig: Take 1 Tab by mouth daily. Class: Normal  
 Pharmacy: [id number] [place] [address],[address] [address] Ph #: [phone] Route: Oral  
  
We Performed the Following AMB POC EKG ROUTINE W/ 12 LEADS, INTER & REP [36182 CPT(R)] To-Do List   
 02/01/2017 ECHO:  2D ECHO COMPLETE ADULT (TTE) W OR WO CONTR   
  
 02/01/2017 ECHO:  ECHO TTE STRESS EXRCSE COMP W OR WO CONTR   
  
 05/01/2017 Lab:  CK   
  
 05/01/2017 Lab:  LIPID PANEL   
  
 05/01/2017 Lab:  METABOLIC PANEL, COMPREHENSIVE [facility]! Jaime Meade introduces [employer] patient portal. Now you can access parts of your medical record, email your doctor's office, and request medication refills online. 1. In your internet browser, go to [email or web address]. [email or web address] 2. Click on the First Time User? Click Here link in the Sign In box. You will see the New Member Sign Up page. 3. Enter your [employer] Access Code exactly as it appears below. You will not need to use this code after youve completed the sign-up process. If you do not sign up before the expiration date, you must request a new code. · [employer] Access Code: [email or web address] Expires: 2/28/2017  6:01 PM 
 
4. Enter the last four digits of your Social Security Number (xxxx) and Date of Birth (mm/dd/yyyy) as indicated and click Submit. You will be taken to the next sign-up page. 5. Create a [employer] ID. This will be your [employer] login ID and cannot be changed, so think of one that is secure and easy to remember. 6. Create a [employer] password. You can change your password at any time. 7. Enter your Password Reset Question and Answer. This can be used at a later time if you forget your password. 8. Enter your e-mail address. You will receive e-mail notification when new information is available in [address] 9. Click Sign Up. You can now view and download portions of your medical record. 10. Click the Download Summary menu link to download a portable copy of your medical information. If you have questions, please visit the Frequently Asked Questions section of the [employer] website. Remember, [employer] is NOT to be used for urgent needs. For medical emergencies, dial 911. Now available from your iPhone and Android! Please provide this summary of care documentation to your next provider. Your primary care clinician is listed as Arpit Gore If you have any questions after today's visit, please call [phone].

## 2017-02-02 ENCOUNTER — CLINICAL SUPPORT (OUTPATIENT)
Dept: CARDIOLOGY CLINIC | Age: 67
End: 2017-02-02

## 2017-02-02 DIAGNOSIS — R06.02 SOB (SHORTNESS OF BREATH) ON EXERTION: Chronic | ICD-10-CM

## 2017-02-02 DIAGNOSIS — I10 ESSENTIAL HYPERTENSION: ICD-10-CM

## 2017-02-02 DIAGNOSIS — Z72.0 TOBACCO ABUSE DISORDER: ICD-10-CM

## 2017-02-02 DIAGNOSIS — R07.89 CHEST PAIN, ATYPICAL: ICD-10-CM

## 2017-02-06 ENCOUNTER — CLINICAL SUPPORT (OUTPATIENT)
Dept: CARDIOLOGY CLINIC | Age: 67
End: 2017-02-06

## 2017-02-06 DIAGNOSIS — I10 ESSENTIAL HYPERTENSION: ICD-10-CM

## 2017-02-06 DIAGNOSIS — R07.89 CHEST PAIN, ATYPICAL: ICD-10-CM

## 2017-02-06 DIAGNOSIS — R06.02 SOB (SHORTNESS OF BREATH) ON EXERTION: Chronic | ICD-10-CM

## 2017-02-06 DIAGNOSIS — Z72.0 TOBACCO ABUSE DISORDER: ICD-10-CM

## 2017-02-07 NOTE — PROGRESS NOTES
Please advise stress echocardiogram negative for evidence of blockages in the arteries of the heart. Oxygen level dropped some and he had some wheezing at office visit, so I do recommend he follow-up with pulmonary Associates for his lungs. VERY important to quit smoking.

## 2017-02-08 NOTE — PROGRESS NOTES
Spoke with patient  Verified patient with 2 patient identifier  Informed per Dr Bard Martínez stress echocardiogram negative for evidence of blockages in the arteries of the heart.  Oxygen level dropped some and he had some wheezing at office visit, so I do recommend he follow-up with pulmonary Associates for his lungs.  VERY important to quit smoking. Patient verbalized understanding.

## 2017-03-13 ENCOUNTER — OFFICE VISIT (OUTPATIENT)
Dept: INTERNAL MEDICINE CLINIC | Age: 67
End: 2017-03-13

## 2017-03-13 ENCOUNTER — TELEPHONE (OUTPATIENT)
Dept: INTERNAL MEDICINE CLINIC | Age: 67
End: 2017-03-13

## 2017-03-13 VITALS
HEART RATE: 72 BPM | SYSTOLIC BLOOD PRESSURE: 160 MMHG | BODY MASS INDEX: 25.25 KG/M2 | HEIGHT: 70 IN | TEMPERATURE: 98.1 F | WEIGHT: 176.4 LBS | OXYGEN SATURATION: 94 % | RESPIRATION RATE: 16 BRPM | DIASTOLIC BLOOD PRESSURE: 75 MMHG

## 2017-03-13 DIAGNOSIS — J06.9 ACUTE URI: Primary | ICD-10-CM

## 2017-03-13 LAB
S PYO AG THROAT QL: NEGATIVE
VALID INTERNAL CONTROL?: YES

## 2017-03-13 RX ORDER — CODEINE PHOSPHATE AND GUAIFENESIN 10; 100 MG/5ML; MG/5ML
5 SOLUTION ORAL
Qty: 120 ML | Refills: 0 | Status: SHIPPED | OUTPATIENT
Start: 2017-03-13 | End: 2017-07-13 | Stop reason: ALTCHOICE

## 2017-03-13 NOTE — PROGRESS NOTES
1. Have you been to the ER, urgent care clinic since your last visit? Hospitalized since your last visit? NO    2. Have you seen or consulted any other health care providers outside of the [address] [place] [address] since your last visit? Include any pap smears or colon screening.  NO

## 2017-03-13 NOTE — PROGRESS NOTES
Subjective:  Mr. Millicent Fay is a pt. of Sukhjinder Carpenter III, DO who presents with complaint of URI symptoms. He has cough which is dry.  +ear pain, +sore throat. +Fatigue. +Achy. No real nasal congestion, runny nose. It is reported that his symptoms began 2 days ago. No sick contacts of whom he is aware. Has taken tylenol. SH: 2 pack per day. Objective: Vitals: See nurse notes. General: The patient is in NAD. HEENT: PERRLA. EOMI. OP mild posterior pharyngeal erythema. There is no tenderness to percussion over sinuses. TM: Pearly bilaterally. Neck: Supple. No LAD. Lungs: CTAB. Heart: RRR. No M/G/R. Abd: S, NT, ND. Ext: Warm. No C/C/E. Assessment: URI, viral.     Plan: Symptomatic care (rest, fluids, OTC analgesics, etc). Robitussin AC for symptom relief. Call if no better in 10 days, or worsens. F/U with primary care physician as planned.

## 2017-03-13 NOTE — TELEPHONE ENCOUNTER
Laney-spouse on [employer] called and states that she is needing a call back in regards to getting th pt seen this afternoon around 4:00pm (around her appt time) for coughing and sore throat. Please call Hubert Franklin to advise.

## 2017-03-13 NOTE — MR AVS SNAPSHOT
Visit Information Date & Time Provider Department Dept. Phone Encounter #  
 3/13/2017  4:15 PM Last Mast, [address] [place] [address],[address] [phone] [id number] Your Appointments 7/13/2017  9:00 AM  
ROUTINE CARE with Romayne Mosses III, DO MINNIE [facility] [address]) Appt Note: 6 month follow up  
 [address] [facility] [address] [address]  
[address] [facility] [address] [facility] [address] Upcoming Health Maintenance Date Due  
 GLAUCOMA SCREENING Q2Y 8/28/2013 MEDICARE YEARLY EXAM 8/28/2013 Pneumococcal 65+ Low/Medium Risk (2 of 2 - PPSV23) 1/13/2018 COLONOSCOPY 11/30/2021 DTaP/Tdap/Td series (2 - Td) 1/13/2027 Allergies as of 3/13/2017  Review Complete On: 3/13/2017 By: Last Mast MD  
 No Known Allergies Current Immunizations  Reviewed on 12/2/2014 No immunizations on file. Not reviewed this visit You Were Diagnosed With   
  
 Codes Comments Acute URI    -  Primary ICD-10-CM: J06.9 ICD-9-CM: 465.9 Vitals BP Pulse Temp Resp Height(growth percentile) Weight(growth percentile) 160/75 (BP 1 Location: Left arm, BP Patient Position: Sitting) 72 98.1 °F (36.7 °C) (Oral) 16 5' 10\" (1.778 m) 176 lb 6.4 oz (80 kg) SpO2 BMI Smoking Status 94% 25.31 kg/m2 Current Every Day Smoker Vitals History BMI and BSA Data Body Mass Index Body Surface Area  
 25.31 kg/m 2 1.99 m 2 Preferred Pharmacy Pharmacy Name Phone [address] [phone] Your Updated Medication List  
  
   
This list is accurate as of: 3/13/17  4:45 PM.  Always use your most recent med list.  
  
  
  
  
 acetaminophen 325 mg tablet Commonly known as:  TYLENOL Take 2 Tabs by mouth every six (6) hours as needed. aspirin delayed-release 81 mg tablet Take 1 Tab by mouth daily. atorvastatin 20 mg tablet Commonly known as:  LIPITOR Take 1 Tab by mouth daily. guaiFENesin-codeine 100-10 mg/5 mL solution Commonly known as:  ROBITUSSIN AC Take 5 mL by mouth three (3) times daily as needed for Cough. Max Daily Amount: 15 mL.  
  
 pantoprazole 40 mg tablet Commonly known as:  PROTONIX Take 40 mg by mouth daily. Prescriptions Printed Refills  
 guaiFENesin-codeine (ROBITUSSIN AC) 100-10 mg/5 mL solution 0 Sig: Take 5 mL by mouth three (3) times daily as needed for Cough. Max Daily Amount: 15 mL. Class: Print Route: Oral  
  
We Performed the Following AMB POC RAPID STREP A [99018 CPT(R)] Introducing [facility]! Jose Chung introduces [employer] patient portal. Now you can access parts of your medical record, email your doctor's office, and request medication refills online. 1. In your internet browser, go to [email or web address] 2. Click on the First Time User? Click Here link in the Sign In box. You will see the New Member Sign Up page. 3. Enter your [employer] Access Code exactly as it appears below. You will not need to use this code after youve completed the sign-up process. If you do not sign up before the expiration date, you must request a new code. · [employer] Access Code: [email or web address] Expires: 6/11/2017  4:44 PM 
 
4. Enter the last four digits of your Social Security Number (xxxx) and Date of Birth (mm/dd/yyyy) as indicated and click Submit. You will be taken to the next sign-up page. 5. Create a [employer] ID. This will be your [employer] login ID and cannot be changed, so think of one that is secure and easy to remember. 6. Create a [employer] password. You can change your password at any time. 7. Enter your Password Reset Question and Answer. This can be used at a later time if you forget your password. 8. Enter your e-mail address. You will receive e-mail notification when new information is available in [address] 9. Click Sign Up. You can now view and download portions of your medical record. 10. Click the Download Summary menu link to download a portable copy of your medical information. If you have questions, please visit the Frequently Asked Questions section of the [employer] website. Remember, [employer] is NOT to be used for urgent needs. For medical emergencies, dial 911. Now available from your iPhone and Android! Please provide this summary of care documentation to your next provider. Your primary care clinician is listed as Kimberli Rodriguez If you have any questions after today's visit, please call [phone].

## 2017-05-01 DIAGNOSIS — I10 ESSENTIAL HYPERTENSION: ICD-10-CM

## 2017-05-01 DIAGNOSIS — R07.89 CHEST PAIN, ATYPICAL: ICD-10-CM

## 2017-05-01 DIAGNOSIS — R06.02 SOB (SHORTNESS OF BREATH) ON EXERTION: Chronic | ICD-10-CM

## 2017-05-01 DIAGNOSIS — Z72.0 TOBACCO ABUSE DISORDER: ICD-10-CM

## 2017-07-13 ENCOUNTER — OFFICE VISIT (OUTPATIENT)
Dept: INTERNAL MEDICINE CLINIC | Age: 67
End: 2017-07-13

## 2017-07-13 VITALS
TEMPERATURE: 98.3 F | SYSTOLIC BLOOD PRESSURE: 150 MMHG | HEART RATE: 74 BPM | RESPIRATION RATE: 18 BRPM | OXYGEN SATURATION: 90 % | WEIGHT: 174.4 LBS | BODY MASS INDEX: 24.97 KG/M2 | DIASTOLIC BLOOD PRESSURE: 84 MMHG | HEIGHT: 70 IN

## 2017-07-13 DIAGNOSIS — Z00.00 ROUTINE GENERAL MEDICAL EXAMINATION AT A HEALTH CARE FACILITY: ICD-10-CM

## 2017-07-13 DIAGNOSIS — Z13.31 SCREENING FOR DEPRESSION: ICD-10-CM

## 2017-07-13 DIAGNOSIS — Z13.5 GLAUCOMA SCREENING: ICD-10-CM

## 2017-07-13 DIAGNOSIS — Z72.0 TOBACCO ABUSE DISORDER: ICD-10-CM

## 2017-07-13 DIAGNOSIS — Z13.39 SCREENING FOR ALCOHOLISM: ICD-10-CM

## 2017-07-13 DIAGNOSIS — R06.02 SOB (SHORTNESS OF BREATH) ON EXERTION: Primary | Chronic | ICD-10-CM

## 2017-07-13 RX ORDER — BUDESONIDE AND FORMOTEROL FUMARATE DIHYDRATE 80; 4.5 UG/1; UG/1
2 AEROSOL RESPIRATORY (INHALATION) 2 TIMES DAILY
Qty: 1 INHALER | Refills: 0 | Status: SHIPPED | COMMUNITY
Start: 2017-07-13 | End: 2017-10-02 | Stop reason: SDUPTHER

## 2017-07-13 NOTE — PROGRESS NOTES
This is a Subsequent Medicare Annual Wellness Visit providing Personalized Prevention Plan Services (PPPS) (Performed 12 months after initial AWV and PPPS )    I have reviewed the patient's medical history in detail and updated the computerized patient record. History     Past Medical History:   Diagnosis Date    Diverticulitis     Hypertension     Ill-defined condition     kidney stones/ stant placed      Past Surgical History:   Procedure Laterality Date    COLONOSCOPY N/A 11/30/2016    COLONOSCOPY performed by Refugio Naranjo MD at [facility]CEP/CAUTERY  11/30/2016         HX BACK SURGERY      plate to upper neck    HX CERVICAL FUSION      HX ORTHOPAEDIC      left ring finger-trauma    HX ORTHOPAEDIC      right index finger    HX ORTHOPAEDIC  12/2/14    Bilateral total knee replacements    HX TONSILLECTOMY      HX UROLOGICAL      kidney stone extraction/stent     Current Outpatient Prescriptions   Medication Sig Dispense Refill    budesonide-formoterol (SYMBICORT) 80-4.5 mcg/actuation HFAA inhaler Take 2 Puffs by inhalation two (2) times a day. 1 Inhaler 0    acetaminophen (TYLENOL) 325 mg tablet Take 2 Tabs by mouth every six (6) hours as needed. 30 Tab 0    atorvastatin (LIPITOR) 20 mg tablet Take 1 Tab by mouth daily. 90 Tab 3    aspirin delayed-release 81 mg tablet Take 1 Tab by mouth daily.  30 Tab 12     No Known Allergies  Family History   Problem Relation Age of Onset    No Known Problems Mother     Emphysema Father      Social History   Substance Use Topics    Smoking status: Current Every Day Smoker     Packs/day: 0.25     Years: 50.00     Types: Cigarettes    Smokeless tobacco: Never Used      Comment: electronic cigarettes in the past    Alcohol use 1.8 oz/week     3 Cans of beer per week      Comment: social -3 drinks weekly--rum     Patient Active Problem List   Diagnosis Code    DJD (degenerative joint disease) of knee M17.10    Primary localized osteoarthrosis, lower leg M17.10    Tobacco abuse disorder Z72.0    Alcohol use Z78.9    Long term (current) use of non-steroidal anti-inflammatories (nsaid) Z79.1    Diverticulosis of colon with hemorrhage K57.31    Diverticulosis K57.90    SOB (shortness of breath) on exertion R06.02       Depression Risk Factor Screening:     PHQ over the last two weeks 7/13/2017   Little interest or pleasure in doing things Not at all   Feeling down, depressed or hopeless Not at all   Total Score PHQ 2 0     Alcohol Risk Factor Screening: On any occasion during the past 3 months, have you had more than 4 drinks containing alcohol? Yes    Do you average more than 14 drinks per week? No        Functional Ability and Level of Safety:     Hearing Loss   none    Activities of Daily Living   Self-care. Requires assistance with: no ADLs  ADL Assessment 7/13/2017   Feeding yourself No Help Needed   Getting from bed to chair No Help Needed   Getting dressed No Help Needed   Bathing or showering No Help Needed   Walk across the room (includes cane/walker) No Help Needed   Using the telphone No Help Needed   Taking your medications No Help Needed   Preparing meals No Help Needed   Managing money (expenses/bills) No Help Needed   Moderately strenuous housework (laundry) No Help Needed   Shopping for personal items (toiletries/medicines) No Help Needed   Shopping for groceries No Help Needed   Driving No Help Needed   Climbing a flight of stairs No Help Needed   Getting to places beyond walking distances No Help Needed     Fall Risk   Fall Risk Assessment, last 12 mths 7/13/2017   Able to walk? Yes   Fall in past 12 months? No     Abuse Screen   Patient is not abused  Abuse Screening Questionnaire 7/13/2017   Do you ever feel afraid of your partner? N   Are you in a relationship with someone who physically or mentally threatens you? N   Is it safe for you to go home?  Y     Review of Systems   Not required    Physical Examination     Evaluation of Cognitive Function:  Mood/affect:  neutral  Appearance: age appropriate and casually dressed  Family member/caregiver input: patient here today by himself. No exam performed today, Medicare Wellness Visit Completed. Patient Care Team:  Florin Hawkins DO as PCP - General (Internal Medicine)  Bertha Zelaya MD as Physician (Cardiology)    Advice/Referrals/Counseling   Education and counseling provided    Assessment/Plan     Kassandra Arriaza presents today for a Medicare Wellness Visit. 1) Cardiovascular Screening- patient see's Dr. Madeline Bethea annually. Last lipid panel completed in 12/2016 (normal). Next lipid panel due 12/2016.     2) Colon Cancer Screening- Colonoscopy completed in 11/2016 while patient admitted for GI bleed. Results + diverticulosis & colon polyps; repeat in 5 years. Colonoscopy due in 11/2021. 3) Prostate Cancer Screening- PSA completed 01/2017. Will need repeat PSA at next follow up in October. 4) Recommended Vaccinations- patient declines all recommended vaccines. 5) Smoking Cessation- The patient was counseled on the dangers of tobacco use, and was advised to quit. Reviewed strategies to maximize success, including written materials. 6) Advance Care Planning- A copy of patient's completed Advanced Medical Directive is on file in the patient's medical record. Writer reviewed Advanced Medical Directive document with patient & patient denies the need for changes/ updates.                Attending note:  Agree with above  Thanks  Bo Pearson do

## 2017-07-13 NOTE — PROGRESS NOTES
Rodrigo Alvarenga is a 74 y.o. male who presents for evaluation of routine follow up. Last seen by me jan 13, 2017 in new pt visit. Doing ok, though continues to have increased sob/solo.    'not able to work like I used to'  Saw cardio, dr Torres Gaitan, had normal stress echo. Decided not to see pulmonary. Smoking has increased some, up to 1.5 ppd. Also complains of some itching to left hip area, on/off for at least 6 months. No rash.       ROS:  Constitutional: negative for fevers, chills, anorexia and weight loss  Eyes:   negative for visual disturbance and irritation  ENT:   negative for tinnitus,sore throat,nasal congestion,ear pain,hoarseness  Respiratory:  negative for  hemoptysis, dyspnea,wheezing.  ++cough  CV:   negative for chest pain, palpitations, lower extremity edema  GI:   negative for nausea, vomiting, diarrhea, abdominal pain,melena  Genitourinary: negative for frequency, dysuria and hematuria  Musculoskel: negative for myalgias, arthralgias, back pain, muscle weakness, joint pain  Neurological:  negative for headaches, dizziness, focal weakness, numbness  Psychiatric:     Negative for depression or anxiety      Past Medical History:   Diagnosis Date    Diverticulitis     Hypertension     Ill-defined condition     kidney stones/ stant placed       Past Surgical History:   Procedure Laterality Date    COLONOSCOPY N/A 11/30/2016    COLONOSCOPY performed by Trinity Ahmadi MD at . [facility],FORCEP/CAUTERY  11/30/2016         HX BACK SURGERY      plate to upper neck    HX CERVICAL FUSION      HX ORTHOPAEDIC      left ring finger-trauma    HX ORTHOPAEDIC      right index finger    HX ORTHOPAEDIC  12/2/14    Bilateral total knee replacements    HX TONSILLECTOMY      HX UROLOGICAL      kidney stone extraction/stent       Family History   Problem Relation Age of Onset    No Known Problems Mother     Emphysema Father        Social History     Social History    Marital status:      Spouse name: N/A    Number of children: N/A    Years of education: N/A     Occupational History    Not on file. Social History Main Topics    Smoking status: Current Every Day Smoker     Packs/day: 0.25     Years: 50.00     Types: Cigarettes    Smokeless tobacco: Never Used      Comment: electronic cigarettes in the past    Alcohol use 1.8 oz/week     3 Cans of beer per week      Comment: social -3 drinks weekly--rum    Drug use: No    Sexual activity: Yes     Partners: Female     Other Topics Concern    Not on file     Social History Narrative            Visit Vitals    /84 (BP 1 Location: Left arm, BP Patient Position: Sitting)    Pulse 74    Temp 98.3 °F (36.8 °C) (Oral)    Resp 18    Ht 5' 10\" (1.778 m)    Wt 174 lb 6.4 oz (79.1 kg)    SpO2 90%    BMI 25.02 kg/m2       Physical Examination:   General - Well appearing male  HEENT - PERRL, TM no erythema/opacification, normal nasal turbinates, no oropharyngeal erythema or exudate, MMM  Neck - supple, no bruits, no thyroidomegaly, no lymphadenopathy  Pulm - clear to auscultation bilaterally  Cardio - RRR, normal S1 S2, no murmur  Abd - soft, nontender, no masses, no HSM  Extrem - no edema, +2 distal pulses  Neuro-  No focal deficits, CN intact     Assessment/Plan:    1.  Sob/solo, given his smoking hx, probably has copd--samples given for symbicort. 2.  Mild hypoxia--see above  3. Tobacco abuse--has smoked since age 11. Info given on cessation  4. Elevated blood pressure--monitor for now  5. Routine adult health maintenance--referral to ophtho, dr [place]jgan [place], for glaucoma screening  6.   Itch left hip--try hydrocortisone cream    rtc 3 months        [facility], DO

## 2017-07-13 NOTE — PATIENT INSTRUCTIONS
Try the symbicort 2 puffs 2x daily. Let me know in a few weeks if it is helping, and I will get you a prescription or more samples. Try to work on cutting back on smoking. Try hydrocortisone cream otc for the itching in your hip. Learning About Benefits From Quitting Smoking  How does quitting smoking make you healthier? If you're thinking about quitting smoking, you may have a few reasons to be smoke-free. Your health may be one of them. · When you quit smoking, you lower your risks for cancer, lung disease, heart attack, stroke, blood vessel disease, and blindness from macular degeneration. · When you're smoke-free, you get sick less often, and you heal faster. You are less likely to get colds, flu, bronchitis, and pneumonia. · As a nonsmoker, you may find that your mood is better and you are less stressed. When and how will you feel healthier? Quitting has real health benefits that start from day 1 of being smoke-free. And the longer you stay smoke-free, the healthier you get and the better you feel. The first hours  · After just 20 minutes, your blood pressure and heart rate go down. That means there's less stress on your heart and blood vessels. · Within 12 hours, the level of carbon monoxide in your blood drops back to normal. That makes room for more oxygen. With more oxygen in your body, you may notice that you have more energy than when you smoked. After 2 weeks  · Your lungs start to work better. · Your risk of heart attack starts to drop. After 1 month  · When your lungs are clear, you cough less and breathe deeper, so it's easier to be active. · Your sense of taste and smell return. That means you can enjoy food more than you have since you started smoking. Over the years  · After 1 year, your risk of heart disease is half what it would be if you kept smoking. · After 5 years, your risk of stroke starts to shrink.  Within a few years after that, it's about the same as if you'd never smoked. · After 10 years, your risk of dying from lung cancer is cut by about half. And your risk for many other types of cancer is lower too. How would quitting help others in your life? When you quit smoking, you improve the health of everyone who now breathes in your smoke. · Their heart, lung, and cancer risks drop, much like yours. · They are sick less. For babies and small children, living smoke-free means they're less likely to have ear infections, pneumonia, and bronchitis. · If you're a woman who is or will be pregnant someday, quitting smoking means a healthier . · Children who are close to you are less likely to become adult smokers. Where can you learn more? Go to http://caprice[employer] Mountain Digitalerich.info/. Enter [id number] [id number] [id number] 11 in the search box to learn more about \"Learning About Benefits From Quitting Smoking. \"  Current as of: 2017  Content Version: 11.3  © 7318-0328 [employer]. Care instructions adapted under license by [employer] (which disclaims liability or warranty for this information). If you have questions about a medical condition or this instruction, always ask your healthcare professional. [place] [address] any warranty or liability for your use of this information. Chronic Obstructive Pulmonary Disease (COPD): Care Instructions  Your Care Instructions    Chronic obstructive pulmonary disease (COPD) is a general term for a group of lung diseases, including emphysema and chronic bronchitis. People with COPD have decreased airflow in and out of the lungs, which makes it hard to breathe. The airways also can get clogged with thick mucus. Cigarette smoking is a major cause of COPD. Although there is no cure for COPD, you can slow its progress. Following your treatment plan and taking care of yourself can help you feel better and live longer. Follow-up care is a key part of your treatment and safety.  Be sure to make and go to all appointments, and call your doctor if you are having problems. It's also a good idea to know your test results and keep a list of the medicines you take. How can you care for yourself at home? Staying healthy  · Do not smoke. This is the most important step you can take to prevent more damage to your lungs. If you need help quitting, talk to your doctor about stop-smoking programs and medicines. These can increase your chances of quitting for good. · Avoid colds and flu. Get a pneumococcal vaccine shot. If you have had one before, ask your doctor whether you need a second dose. Get the flu vaccine every fall. If you must be around people with colds or the flu, wash your hands often. · Avoid secondhand smoke, air pollution, and high altitudes. Also avoid cold, dry air and hot, humid air. Stay at home with your windows closed when air pollution is bad. Medicines and oxygen therapy  · Take your medicines exactly as prescribed. Call your doctor if you think you are having a problem with your medicine. · You may be taking medicines such as:  ¨ Bronchodilators. These help open your airways and make breathing easier. Bronchodilators are either short-acting (work for 6 to 9 hours) or long-acting (work for 24 hours). You inhale most bronchodilators, so they start to act quickly. Always carry your quick-relief inhaler with you in case you need it while you are away from home. ¨ Corticosteroids (prednisone, budesonide). These reduce airway inflammation. They come in pill or inhaled form. You must take these medicines every day for them to work well. · A spacer may help you get more inhaled medicine to your lungs. Ask your doctor or pharmacist if a spacer is right for you. If it is, ask how to use it properly. · Do not take any vitamins, over-the-counter medicine, or herbal products without talking to your doctor first.  · If your doctor prescribed antibiotics, take them as directed.  Do not stop taking them just because you feel better. You need to take the full course of antibiotics. · Oxygen therapy boosts the amount of oxygen in your blood and helps you breathe easier. Use the flow rate your doctor has recommended, and do not change it without talking to your doctor first.  Activity  · Get regular exercise. Walking is an easy way to get exercise. Start out slowly, and walk a little more each day. · Pay attention to your breathing. You are exercising too hard if you cannot talk while you are exercising. · Take short rest breaks when doing household chores and other activities. · Learn breathing methodssuch as breathing through pursed lipsto help you become less short of breath. · If your doctor has not set you up with a pulmonary rehabilitation program, talk to him or her about whether rehab is right for you. Rehab includes exercise programs, education about your disease and how to manage it, help with diet and other changes, and emotional support. Diet  · Eat regular, healthy meals. Use bronchodilators about 1 hour before you eat to make it easier to eat. Eat several small meals instead of three large ones. Drink beverages at the end of the meal. Avoid foods that are hard to chew. · Eat foods that contain protein so that you do not lose muscle mass. · Talk with your doctor if you gain too much weight or if you lose weight without trying. Mental health  · Talk to your family, friends, or a therapist about your feelings. It is normal to feel frightened, angry, hopeless, helpless, and even guilty. Talking openly about bad feelings can help you cope. If these feelings last, talk to your doctor. When should you call for help? Call 911 anytime you think you may need emergency care. For example, call if:  · You have severe trouble breathing. Call your doctor now or seek immediate medical care if:  · You have new or worse trouble breathing. · You cough up blood. · You have a fever.   Watch closely for changes in your health, and be sure to contact your doctor if:  · You cough more deeply or more often, especially if you notice more mucus or a change in the color of your mucus. · You have new or worse swelling in your legs or belly. · You are not getting better as expected. Where can you learn more? Go to http://caprice-erich.info/. Radha Dc in the search box to learn more about \"Chronic Obstructive Pulmonary Disease (COPD): Care Instructions. \"  Current as of: March 25, 2017  Content Version: 11.3  © 5474-7495 [employer]. Care instructions adapted under license by [employer] (which disclaims liability or warranty for this information). If you have questions about a medical condition or this instruction, always ask your healthcare professional. [place] [address] any warranty or liability for your use of this information. Medicare Part B Preventive Services Guidelines/Limitations Date last completed and Frequency Due Date   Bone Mass Measurement  (age 72 & older, biennial) Requires diagnosis related to osteoporosis or estrogen deficiency.  Biennial benefit unless patient has history of long-term glucocorticoid tx or baseline is needed because initial test was by other method Completed: not indicated per Dr. Radha Eason        Recommended every 2 years Due: not indicated per Dr. Ben Gallegos Blood Tests (every 5 years)  Total cholesterol, HDL, Triglycerides Order as a panel if possible Completed: 12/2016     Recommended annually Due: 12/2017   Colorectal Cancer Screening  -Fecal occult blood test (annual)  -Flexible sigmoidoscopy (5y)  -Screening colonoscopy (10y)  -Barium Enema  Completed: 11/2016       Recommended every 5 years  Due: 11/2021   Counseling to Prevent Tobacco Use (up to 8 sessions per year)  - Counseling greater than 3 and up to 10 minutes  - Counseling greater than 10 minutes Patients must be asymptomatic of tobacco-related conditions to receive as preventive service     Diabetes Screening Tests (at least every 3 years, Medicare covers annually or at 6-month intervals for prediabetic patients)    Fasting blood sugar (FBS) or glucose tolerance test (GTT) Patient must be diagnosed with one of the following:  -Hypertension, Dyslipidemia, obesity, previous impaired FBS or GTT  Or any two of the following: overweight, FH of diabetes, age ? 72, history of gestational diabetes, birth of baby weighing more than 9 pounds Completed: not indicated    Recommended every 3 years for non-diabetics    Recommended every 3-6 months for Pre-Diabetics and Diabetics Due: not indicated   Diabetes Self-Management Training (DSMT) (no USPSTF recommendation) Requires referral by treating physician for patient with diabetes or renal disease. 10 hours of initial DSMT session of no less than 30 minutes each in a continuous 12-month period. 2 hours of follow-up DSMT in subsequent years. Glaucoma Screening (no USPSTF recommendation) Diabetes mellitus, family history, , age 48 or over,  American, age 72 or over Completed: Due, referred to Dr. Gerald Barton today by Dr. Claudeen Hung      Recommended annually Due: Due, referred to Dr. Gerald Barton today by Dr. Robby Sewell (HIV) Screening (annually for increased risk patients)  HIV-1 and HIV-2 by EIA, PAUL, rapid antibody test, or oral mucosa transudate Patient must be at increased risk for HIV infection per USPSTF guidelines or pregnant. Tests covered annually for patients at increased risk. Pregnant patients may receive up to 3 test during pregnancy. Medical Nutrition Therapy (MNT) (for diabetes or renal disease not recommended schedule) Requires referral by treating physician for patient with diabetes or renal disease.   Can be provided in same year as diabetes self-management training (DSMT), and CMS recommends medical nutrition therapy take place after DSMT. Up to 3 hours for initial year and 2 hours in subsequent years. Prostate Cancer Screening (annually up to age 76)  - Digital rectal exam (EDILMA)  - Prostate specific antigen (PSA) Annually (age 48 or over), EDILMA not paid separately when covered E/M service is provided on same date Completed: 01/2017      Recommended annually to age 76 Due:  2017 for 6 month follow up   Seasonal Influenza Vaccination (annually)  Completed: Declined     Recommended Annually Due: Pt defers vaccine   Pneumococcal Vaccination (once after 72)  Pneumococcal 23 -  Recommended once over the age of 72  Declined  Prevnar 15 -Declined Recommended once over the age of 72 Completed:  Pt defers vaccine      Completed:  Pt defers vaccine   Hepatitis B Vaccinations (if medium/high risk) Medium/high risk factors:  End-stage renal disease,  Hemophiliacs who received Factor VIII or IX concentrates, Clients of institutions for the mentally retarded, Persons who live in the same house as a HepB virus carrier, Homosexual men, Illicit injectable drug abusers. Screening Mammography (biennial age 54-69)? Annually (age 36 or over) Completed: Not Applicable    Due: Not Applicable    Screening Pap Tests and Pelvic Examination (up to age 79 and after 79 if unknown history or abnormal study last 10 years) Every 25 months except high risk Completed: Not Applicable  Due: Not Applicable   Ultrasound Screening for Abdominal Aortic Aneurysm (AAA) (once) Patient must be referred through IPPE and not have had a screening for abdominal aortic aneurysm before under Medicare. Limited to patients who meet one of the following criteria:  - Men who are 73-68 years old and have smoked more than 100 cigarettes in their lifetime.  -Anyone with a FH of AAA  -Anyone recommended for screening by USPSTF         Advance Directives: After Your Visit  Your Care Instructions  An advance directive is a legal way to state your wishes at the end of your life.  It tells your family and your doctor what to do if you can no longer say what you want. There are two main types of advance directives. You can change them any time that your wishes change. · A living will tells your family and your doctor your wishes about life support and other treatment. · A medical power of  lets you name a person to make treatment decisions for you when you can't speak for yourself. This person is called a health care agent. If you do not have an advance directive, decisions about your medical care may be made by a doctor or a  who doesn't know you. It may help to think of an advance directive as a gift to the people who care for you. If you have one, they won't have to make tough decisions by themselves. Follow-up care is a key part of your treatment and safety. Be sure to make and go to all appointments, and call your doctor if you are having problems. It's also a good idea to know your test results and keep a list of the medicines you take. How can you care for yourself at home? · Discuss your wishes with your loved ones and your doctor. This way, there are no surprises. · Many states have a unique form. Or you might use a universal form that has been approved by many states. This kind of form can sometimes be completed and stored online. Your electronic copy will then be available wherever you have a connection to the Internet. In most cases, doctors will respect your wishes even if you have a form from a different state. · You don't need a  to do an advance directive. But you may want to get legal advice. · Think about these questions when you prepare an advance directive:  ¨ Who do you want to make decisions about your medical care if you are not able to? Many people choose a family member, close friend, or doctor. ¨ Do you know enough about life support methods that might be used? If not, talk to your doctor so you understand.   ¨ What are you most afraid of that might happen? You might be afraid of having pain, losing your independence, or being kept alive by machines. ¨ Where would you prefer to die? Choices include your home, a hospital, or a nursing home. ¨ Would you like to have information about hospice care to support you and your family? ¨ Do you want to donate organs when you die? ¨ Do you want certain Church practices performed before you die? If so, put your wishes in the advance directive. · Read your advance directive every year, and make changes as needed. When should you call for help? Be sure to contact your doctor if you have any questions. Where can you learn more? Go to [employer].be  Enter R264 in the search box to learn more about \"Advance Directives: After Your Visit. \"   © 9329-5112 [employer]. Care instructions adapted under license by Edna [employer] (which disclaims liability or warranty for this information). This care instruction is for use with your licensed healthcare professional. If you have questions about a medical condition or this instruction, always ask your healthcare professional. [place] [address] any warranty or liability for your use of this information.   Content Version: 83.7.076841; Current as of: February 20, 2015

## 2017-07-13 NOTE — ACP (ADVANCE CARE PLANNING)
A copy of patient's completed Advanced Medical Directive is on file in the patient's medical record. Writer reviewed Advanced Medical Directive document with patient & patient denies the need for changes/ updates.

## 2017-07-13 NOTE — MR AVS SNAPSHOT
Visit Information Date & Time Provider Department Dept. Phone Encounter #  
 7/13/2017  9:00 AM Prema Mckenna, [address] [id number] Follow-up Instructions Return in about 3 months (around 10/13/2017). Upcoming Health Maintenance Date Due  
 GLAUCOMA SCREENING Q2Y 8/28/2013 MEDICARE YEARLY EXAM 8/28/2013 INFLUENZA AGE 9 TO ADULT 8/1/2017 Pneumococcal 65+ Low/Medium Risk (2 of 2 - PPSV23) 1/13/2018 COLONOSCOPY 11/30/2021 DTaP/Tdap/Td series (2 - Td) 1/13/2027 Allergies as of 7/13/2017  Review Complete On: 7/13/2017 By: Salo Betts III, DO No Known Allergies Current Immunizations  Reviewed on 12/2/2014 No immunizations on file. Not reviewed this visit You Were Diagnosed With   
  
 Codes Comments SOB (shortness of breath) on exertion    -  Primary ICD-10-CM: R06.02 
ICD-9-CM: 786.05 Tobacco abuse disorder     ICD-10-CM: Z72.0 ICD-9-CM: 305.1 Glaucoma screening     ICD-10-CM: Z13.5 ICD-9-CM: V80.1 Routine general medical examination at a health care facility     ICD-10-CM: Z00.00 ICD-9-CM: V70.0 Screening for alcoholism     ICD-10-CM: Z13.89 ICD-9-CM: V79.1 Screening for depression     ICD-10-CM: Z13.89 ICD-9-CM: V79.0 Vitals BP Pulse Temp Resp Height(growth percentile) Weight(growth percentile) 150/84 (BP 1 Location: Left arm, BP Patient Position: Sitting) 74 98.3 °F (36.8 °C) (Oral) 18 5' 10\" (1.778 m) 174 lb 6.4 oz (79.1 kg) SpO2 BMI Smoking Status 90% 25.02 kg/m2 Current Every Day Smoker Vitals History BMI and BSA Data Body Mass Index Body Surface Area 25.02 kg/m 2 1.98 m 2 Preferred Pharmacy Pharmacy Name Phone [facility], [phone] [phone] Your Updated Medication List  
  
   
This list is accurate as of: 7/13/17  9:29 AM.  Always use your most recent med list.  
  
  
  
  
 acetaminophen 325 mg tablet Commonly known as:  TYLENOL Take 2 Tabs by mouth every six (6) hours as needed. aspirin delayed-release 81 mg tablet Take 1 Tab by mouth daily. atorvastatin 20 mg tablet Commonly known as:  LIPITOR Take 1 Tab by mouth daily. budesonide-formoterol 80-4.5 mcg/actuation Hfaa inhaler Commonly known as:  SYMBICORT Take 2 Puffs by inhalation two (2) times a day. We Performed the Following [place] [id number] [[id number] [facility]] REFERRAL TO OPHTHALMOLOGY [REF57 Custom] Follow-up Instructions Return in about 3 months (around 10/13/2017). Referral Information Referral ID Referred By Referred To  
  
 [id number] [facility] [address] Vitaly Swift, [address] Phone: [phone] Fax: [phone] Visits Status Start Date End Date 1 New Request 7/13/17 7/13/18 If your referral has a status of pending review or denied, additional information will be sent to support the outcome of this decision. Patient Instructions Try the symbicort 2 puffs 2x daily. Let me know in a few weeks if it is helping, and I will get you a prescription or more samples. Try to work on cutting back on smoking. Try hydrocortisone cream otc for the itching in your hip. Learning About Benefits From Quitting Smoking How does quitting smoking make you healthier? If you're thinking about quitting smoking, you may have a few reasons to be smoke-free. Your health may be one of them. · When you quit smoking, you lower your risks for cancer, lung disease, heart attack, stroke, blood vessel disease, and blindness from macular degeneration. · When you're smoke-free, you get sick less often, and you heal faster. You are less likely to get colds, flu, bronchitis, and pneumonia. · As a nonsmoker, you may find that your mood is better and you are less stressed. When and how will you feel healthier? Quitting has real health benefits that start from day 1 of being smoke-free. And the longer you stay smoke-free, the healthier you get and the better you feel. The first hours · After just 20 minutes, your blood pressure and heart rate go down. That means there's less stress on your heart and blood vessels. · Within 12 hours, the level of carbon monoxide in your blood drops back to normal. That makes room for more oxygen. With more oxygen in your body, you may notice that you have more energy than when you smoked. After 2 weeks · Your lungs start to work better. · Your risk of heart attack starts to drop. After 1 month · When your lungs are clear, you cough less and breathe deeper, so it's easier to be active. · Your sense of taste and smell return. That means you can enjoy food more than you have since you started smoking. Over the years · After 1 year, your risk of heart disease is half what it would be if you kept smoking. · After 5 years, your risk of stroke starts to shrink. Within a few years after that, it's about the same as if you'd never smoked. · After 10 years, your risk of dying from lung cancer is cut by about half. And your risk for many other types of cancer is lower too. How would quitting help others in your life? When you quit smoking, you improve the health of everyone who now breathes in your smoke. · Their heart, lung, and cancer risks drop, much like yours. · They are sick less. For babies and small children, living smoke-free means they're less likely to have ear infections, pneumonia, and bronchitis. · If you're a woman who is or will be pregnant someday, quitting smoking means a healthier . · Children who are close to you are less likely to become adult smokers. Where can you learn more? Go to http://caprice-erich.info/. Enter [id number] in the search box to learn more about \"Learning About Benefits From Quitting Smoking. \" Current as of: March 20, 2017 Content Version: 11.3 © 8048-8819 [employer]. Care instructions adapted under license by [employer] (which disclaims liability or warranty for this information). If you have questions about a medical condition or this instruction, always ask your healthcare professional. [place] [address] any warranty or liability for your use of this information. Chronic Obstructive Pulmonary Disease (COPD): Care Instructions Your Care Instructions Chronic obstructive pulmonary disease (COPD) is a general term for a group of lung diseases, including emphysema and chronic bronchitis. People with COPD have decreased airflow in and out of the lungs, which makes it hard to breathe. The airways also can get clogged with thick mucus. Cigarette smoking is a major cause of COPD. Although there is no cure for COPD, you can slow its progress. Following your treatment plan and taking care of yourself can help you feel better and live longer. Follow-up care is a key part of your treatment and safety. Be sure to make and go to all appointments, and call your doctor if you are having problems. It's also a good idea to know your test results and keep a list of the medicines you take. How can you care for yourself at home? Staying healthy · Do not smoke. This is the most important step you can take to prevent more damage to your lungs. If you need help quitting, talk to your doctor about stop-smoking programs and medicines. These can increase your chances of quitting for good. · Avoid colds and flu. Get a pneumococcal vaccine shot. If you have had one before, ask your doctor whether you need a second dose. Get the flu vaccine every fall. If you must be around people with colds or the flu, wash your hands often. · Avoid secondhand smoke, air pollution, and high altitudes. Also avoid cold, dry air and hot, humid air. Stay at home with your windows closed when air pollution is bad. Medicines and oxygen therapy · Take your medicines exactly as prescribed. Call your doctor if you think you are having a problem with your medicine. · You may be taking medicines such as: ¨ Bronchodilators. These help open your airways and make breathing easier. Bronchodilators are either short-acting (work for 6 to 9 hours) or long-acting (work for 24 hours). You inhale most bronchodilators, so they start to act quickly. Always carry your quick-relief inhaler with you in case you need it while you are away from home. ¨ Corticosteroids (prednisone, budesonide). These reduce airway inflammation. They come in pill or inhaled form. You must take these medicines every day for them to work well. · A spacer may help you get more inhaled medicine to your lungs. Ask your doctor or pharmacist if a spacer is right for you. If it is, ask how to use it properly. · Do not take any vitamins, over-the-counter medicine, or herbal products without talking to your doctor first. 
· If your doctor prescribed antibiotics, take them as directed. Do not stop taking them just because you feel better. You need to take the full course of antibiotics. · Oxygen therapy boosts the amount of oxygen in your blood and helps you breathe easier. Use the flow rate your doctor has recommended, and do not change it without talking to your doctor first. 
Activity · Get regular exercise. Walking is an easy way to get exercise. Start out slowly, and walk a little more each day. · Pay attention to your breathing. You are exercising too hard if you cannot talk while you are exercising. · Take short rest breaks when doing household chores and other activities. · Learn breathing methodssuch as breathing through pursed lipsto help you become less short of breath. · If your doctor has not set you up with a pulmonary rehabilitation program, talk to him or her about whether rehab is right for you. Rehab includes exercise programs, education about your disease and how to manage it, help with diet and other changes, and emotional support. Diet · Eat regular, healthy meals. Use bronchodilators about 1 hour before you eat to make it easier to eat. Eat several small meals instead of three large ones. Drink beverages at the end of the meal. Avoid foods that are hard to chew. · Eat foods that contain protein so that you do not lose muscle mass. · Talk with your doctor if you gain too much weight or if you lose weight without trying. Mental health · Talk to your family, friends, or a therapist about your feelings. It is normal to feel frightened, angry, hopeless, helpless, and even guilty. Talking openly about bad feelings can help you cope. If these feelings last, talk to your doctor. When should you call for help? Call 911 anytime you think you may need emergency care. For example, call if: 
· You have severe trouble breathing. Call your doctor now or seek immediate medical care if: 
· You have new or worse trouble breathing. · You cough up blood. · You have a fever. Watch closely for changes in your health, and be sure to contact your doctor if: 
· You cough more deeply or more often, especially if you notice more mucus or a change in the color of your mucus. · You have new or worse swelling in your legs or belly. · You are not getting better as expected. Where can you learn more? Go to http://caprice-erich.info/. Ai Espinoza in the search box to learn more about \"Chronic Obstructive Pulmonary Disease (COPD): Care Instructions. \" Current as of: March 25, 2017 Content Version: 11.3 © 8489-8609 [employer].  Care instructions adapted under license by [employer] (which disclaims liability or warranty for this information). If you have questions about a medical condition or this instruction, always ask your healthcare professional. Mikala Almaraz any warranty or liability for your use of this information. Medicare Part B Preventive Services Guidelines/Limitations Date last completed and Frequency Due Date Bone Mass Measurement 
(age 72 & older, biennial) Requires diagnosis related to osteoporosis or estrogen deficiency. Biennial benefit unless patient has history of long-term glucocorticoid tx or baseline is needed because initial test was by other method Completed: not indicated per Dr. Claudeen Hung Recommended every 2 years Due: not indicated per Dr. Claudeen Hung Cardiovascular Screening Blood Tests (every 5 years) Total cholesterol, HDL, Triglycerides Order as a panel if possible Completed: 12/2016 Recommended annually Due: 12/2017 Colorectal Cancer Screening 
-Fecal occult blood test (annual) -Flexible sigmoidoscopy (5y) 
-Screening colonoscopy (10y) -Barium Enema  Completed: 11/2016 Recommended every 5 years  Due: 11/2021 Counseling to Prevent Tobacco Use (up to 8 sessions per year) - Counseling greater than 3 and up to 10 minutes - Counseling greater than 10 minutes Patients must be asymptomatic of tobacco-related conditions to receive as preventive service Diabetes Screening Tests (at least every 3 years, Medicare covers annually or at 6-month intervals for prediabetic patients) Fasting blood sugar (FBS) or glucose tolerance test (GTT) Patient must be diagnosed with one of the following: 
-Hypertension, Dyslipidemia, obesity, previous impaired FBS or GTT 
Or any two of the following: overweight, FH of diabetes, age ? 72, history of gestational diabetes, birth of baby weighing more than 9 pounds Completed: not indicated Recommended every 3 years for non-diabetics Recommended every 3-6 months for Pre-Diabetics and Diabetics Due: not indicated Diabetes Self-Management Training (DSMT) (no USPSTF recommendation) Requires referral by treating physician for patient with diabetes or renal disease. 10 hours of initial DSMT session of no less than 30 minutes each in a continuous 12-month period. 2 hours of follow-up DSMT in subsequent years. Glaucoma Screening (no USPSTF recommendation) Diabetes mellitus, family history, , age 48 or over,  American, age 72 or over Completed: Due, referred to Dr. Jagurti iWck today by Dr. Bel De Recommended annually Due: Due, referred to Dr. Jagruti Wick today by Dr. Bel De Human Immunodeficiency Virus (HIV) Screening (annually for increased risk patients) HIV-1 and HIV-2 by EIA, PAUL, rapid antibody test, or oral mucosa transudate Patient must be at increased risk for HIV infection per USPSTF guidelines or pregnant. Tests covered annually for patients at increased risk. Pregnant patients may receive up to 3 test during pregnancy. Medical Nutrition Therapy (MNT) (for diabetes or renal disease not recommended schedule) Requires referral by treating physician for patient with diabetes or renal disease. Can be provided in same year as diabetes self-management training (DSMT), and CMS recommends medical nutrition therapy take place after DSMT. Up to 3 hours for initial year and 2 hours in subsequent years. Prostate Cancer Screening (annually up to age 76) - Digital rectal exam (EDILMA) - Prostate specific antigen (PSA) Annually (age 48 or over), EDILMA not paid separately when covered E/M service is provided on same date Completed: 01/2017 Recommended annually to age 76 Due:  2017 for 6 month follow up Seasonal Influenza Vaccination (annually)  Completed: Declined Recommended Annually Due: Pt defers vaccine Pneumococcal Vaccination (once after 65)  Pneumococcal 23 - Recommended once over the age of 72 Declined Prevnar 13 -Declined Recommended once over the age of 72 Completed: 
Pt defers vaccine Completed: 
Pt defers vaccine Hepatitis B Vaccinations (if medium/high risk) Medium/high risk factors:  End-stage renal disease, Hemophiliacs who received Factor VIII or IX concentrates, Clients of institutions for the mentally retarded, Persons who live in the same house as a HepB virus carrier, Homosexual men, Illicit injectable drug abusers. Screening Mammography (biennial age 54-69)? Annually (age 36 or over) Completed: Not Applicable Due: Not Applicable Screening Pap Tests and Pelvic Examination (up to age 79 and after 79 if unknown history or abnormal study last 10 years) Every 24 months except high risk Completed: Not Applicable  Due: Not Applicable Ultrasound Screening for Abdominal Aortic Aneurysm (AAA) (once) Patient must be referred through IPPE and not have had a screening for abdominal aortic aneurysm before under Medicare. Limited to patients who meet one of the following criteria: 
- Men who are 73-68 years old and have smoked more than 100 cigarettes in their lifetime. 
-Anyone with a FH of AAA 
-Anyone recommended for screening by USPSTF Advance Directives: After Your Visit Your Care Instructions An advance directive is a legal way to state your wishes at the end of your life. It tells your family and your doctor what to do if you can no longer say what you want. There are two main types of advance directives. You can change them any time that your wishes change. · A living will tells your family and your doctor your wishes about life support and other treatment. · A medical power of  lets you name a person to make treatment decisions for you when you can't speak for yourself. This person is called a health care agent. If you do not have an advance directive, decisions about your medical care may be made by a doctor or a  who doesn't know you. It may help to think of an advance directive as a gift to the people who care for you. If you have one, they won't have to make tough decisions by themselves. Follow-up care is a key part of your treatment and safety. Be sure to make and go to all appointments, and call your doctor if you are having problems. It's also a good idea to know your test results and keep a list of the medicines you take. How can you care for yourself at home? · Discuss your wishes with your loved ones and your doctor. This way, there are no surprises. · Many states have a unique form. Or you might use a universal form that has been approved by many states. This kind of form can sometimes be completed and stored online. Your electronic copy will then be available wherever you have a connection to the Internet. In most cases, doctors will respect your wishes even if you have a form from a different state. · You don't need a  to do an advance directive. But you may want to get legal advice. · Think about these questions when you prepare an advance directive: ¨ Who do you want to make decisions about your medical care if you are not able to? Many people choose a family member, close friend, or doctor. ¨ Do you know enough about life support methods that might be used? If not, talk to your doctor so you understand. ¨ What are you most afraid of that might happen? You might be afraid of having pain, losing your independence, or being kept alive by machines. ¨ Where would you prefer to die? Choices include your home, a hospital, or a nursing home. ¨ Would you like to have information about hospice care to support you and your family? ¨ Do you want to donate organs when you die? ¨ Do you want certain Faith practices performed before you die? If so, put your wishes in the advance directive. · Read your advance directive every year, and make changes as needed. When should you call for help? Be sure to contact your doctor if you have any questions. Where can you learn more? Go to DealMy Perfect Giger.be Enter R264 in the search box to learn more about \"Advance Directives: After Your Visit. \"  
© 8185-1702 [employer]. Care instructions adapted under license by [facility] (which disclaims liability or warranty for this information). This care instruction is for use with your licensed healthcare professional. If you have questions about a medical condition or this instruction, always ask your healthcare professional. [address] any warranty or liability for your use of this information. Content Version: 53.3.438167; Current as of: February 20, 2015 Introducing \[facility]\"" & [facility] SERVICES! [facility] introduces [employer] patient portal. Now you can access parts of your medical record, email your doctor's office, and request medication refills online. 1. In your internet browser, go to [email or web address] Web Collaboration [email or web address]. [email or web address]/The Web Collaboration Network 2. Click on the First Time User? Click Here link in the Sign In box. You will see the New Member Sign Up page. 3. Enter your [employer] Access Code exactly as it appears below. You will not need to use this code after youve completed the sign-up process. If you do not sign up before the expiration date, you must request a new code. · [employer] Access Code: [email or web address] Expires: 10/11/2017  9:29 AM 
 
4. Enter the last four digits of your Social Security Number (xxxx) and Date of Birth (mm/dd/yyyy) as indicated and click Submit. You will be taken to the next sign-up page. 5. Create a [employer]Union ID. This will be your [employer]Union login ID and cannot be changed, so think of one that is secure and easy to remember. 6. Create a [employer] password. You can change your password at any time. 7. Enter your Password Reset Question and Answer. This can be used at a later time if you forget your password. 8. Enter your e-mail address. You will receive e-mail notification when new information is available in [address] 9. Click Sign Up. You can now view and download portions of your medical record. 10. Click the Download Summary menu link to download a portable copy of your medical information. If you have questions, please visit the Frequently Asked Questions section of the [employer] website. Remember, [employer] is NOT to be used for urgent needs. For medical emergencies, dial 911. Now available from your iPhone and Android! Please provide this summary of care documentation to your next provider. Your primary care clinician is listed as Ade Pagan If you have any questions after today's visit, please call [phone].

## 2017-08-25 ENCOUNTER — HOSPITAL ENCOUNTER (EMERGENCY)
Age: 67
Discharge: HOME | End: 2017-08-25
Attending: FAMILY MEDICINE

## 2017-08-25 VITALS
HEART RATE: 76 BPM | RESPIRATION RATE: 22 BRPM | SYSTOLIC BLOOD PRESSURE: 147 MMHG | HEIGHT: 68 IN | WEIGHT: 170.9 LBS | DIASTOLIC BLOOD PRESSURE: 92 MMHG | OXYGEN SATURATION: 92 % | BODY MASS INDEX: 25.9 KG/M2 | TEMPERATURE: 98.4 F

## 2017-08-25 DIAGNOSIS — B02.9 HERPES ZOSTER WITHOUT COMPLICATION: Primary | ICD-10-CM

## 2017-08-25 RX ORDER — DICLOFENAC SODIUM 10 MG/G
GEL TOPICAL 4 TIMES DAILY
Qty: 100 G | Refills: 0 | Status: SHIPPED | OUTPATIENT
Start: 2017-08-25 | End: 2019-06-20

## 2017-08-25 NOTE — DISCHARGE INSTRUCTIONS

## 2017-08-25 NOTE — UC PROVIDER NOTE
Patient is a 74 y.o. male presenting with rash. Rash    This is a new problem. Episode onset: 2 weeks ago. The problem has been gradually improving. Associated with: 1/10 pain. There has been no fever. The rash is present on the right buttock. The pain is at a severity of 1/10. The pain is mild. The pain has been intermittent since onset. Associated symptoms include blisters. Pertinent negatives include no weeping and no hives. He has tried nothing for the symptoms. Past Medical History:   Diagnosis Date    Diverticulitis     Hypertension     Ill-defined condition     kidney stones/ stant placed        Past Surgical History:   Procedure Laterality Date    COLONOSCOPY N/A 11/30/2016    COLONOSCOPY performed by Blaise Cano MD at [facility] ENDOSCOPY    COLONOSCOPY,KADI DUARTE/CAUTERY  11/30/2016         HX BACK SURGERY      plate to upper neck    HX CERVICAL FUSION      HX ORTHOPAEDIC      left ring finger-trauma    HX ORTHOPAEDIC      right index finger    HX ORTHOPAEDIC  12/2/14    Bilateral total knee replacements    HX TONSILLECTOMY      HX UROLOGICAL      kidney stone extraction/stent         Family History   Problem Relation Age of Onset    No Known Problems Mother     Emphysema Father         Social History     Social History    Marital status:      Spouse name: N/A    Number of children: N/A    Years of education: N/A     Occupational History    Not on file.      Social History Main Topics    Smoking status: Current Every Day Smoker     Packs/day: 0.25     Years: 50.00     Types: Cigarettes    Smokeless tobacco: Never Used      Comment: electronic cigarettes in the past    Alcohol use 1.8 oz/week     3 Cans of beer per week      Comment: social -3 drinks weekly--rum    Drug use: No    Sexual activity: Yes     Partners: Female     Other Topics Concern    Not on file     Social History Narrative                ALLERGIES: Review of patient's allergies indicates no known allergies. Review of Systems   Constitutional: Negative for chills, fatigue and fever. HENT: Negative. Eyes: Negative. Respiratory: Negative. Cardiovascular: Negative. Skin: Positive for rash. Allergic/Immunologic: Negative for immunocompromised state. Neurological: Negative. Vitals:    08/25/17 1149   BP: (!) 153/100   Pulse: 76   Resp: 22   Temp: 98.4 °F (36.9 °C)   SpO2: 92%   Weight: 77.5 kg (170 lb 14.4 oz)   Height: 5' 8\" (1.727 m)       Physical Exam   Constitutional: He is oriented to person, place, and time. He appears well-developed and well-nourished. No distress. HENT:   Head: Normocephalic and atraumatic. Right Ear: External ear normal.   Left Ear: External ear normal.   Nose: Nose normal.   Mouth/Throat: Oropharynx is clear and moist. No oropharyngeal exudate. Eyes: Conjunctivae and EOM are normal. Pupils are equal, round, and reactive to light. Right eye exhibits no discharge. No scleral icterus. Neck: Normal range of motion. Neck supple. Cardiovascular: Normal rate, regular rhythm and normal heart sounds. Pulmonary/Chest: Effort normal and breath sounds normal.   Musculoskeletal: Normal range of motion. Neurological: He is alert and oriented to person, place, and time. No cranial nerve deficit. Skin: Skin is warm and dry. Rash noted. Clustered papulovesicular rash on right gluteal region. Scabbed over with erythematous base. Non-tender to touch. Psychiatric: He has a normal mood and affect. His behavior is normal.   Nursing note and vitals reviewed. MDM     Differential Diagnosis; Clinical Impression; Plan:     CLINICAL IMPRESSION:  Herpes zoster without complication  (primary encounter diagnosis)    Plan:  1. Diclofenac gel topical  2. Follow up with PCP immediately for worsening or without continued improvement over the next week.     3. Consider Zoster Vaccine     Risk of Significant Complications, Morbidity, and/or Mortality:   Presenting problems: Moderate  Diagnostic procedures: Moderate  Management options:   Moderate      Procedures

## 2017-10-02 RX ORDER — BUDESONIDE AND FORMOTEROL FUMARATE DIHYDRATE 80; 4.5 UG/1; UG/1
2 AEROSOL RESPIRATORY (INHALATION) 2 TIMES DAILY
Qty: 2 INHALER | Refills: 3 | Status: SHIPPED | OUTPATIENT
Start: 2017-10-02 | End: 2019-10-14 | Stop reason: SDUPTHER

## 2017-10-16 ENCOUNTER — OFFICE VISIT (OUTPATIENT)
Dept: INTERNAL MEDICINE CLINIC | Age: 67
End: 2017-10-16

## 2017-10-16 VITALS
HEIGHT: 68 IN | RESPIRATION RATE: 16 BRPM | WEIGHT: 175 LBS | OXYGEN SATURATION: 93 % | SYSTOLIC BLOOD PRESSURE: 150 MMHG | DIASTOLIC BLOOD PRESSURE: 90 MMHG | BODY MASS INDEX: 26.52 KG/M2 | HEART RATE: 77 BPM | TEMPERATURE: 97.7 F

## 2017-10-16 DIAGNOSIS — B02.9 HERPES ZOSTER WITHOUT COMPLICATION: ICD-10-CM

## 2017-10-16 DIAGNOSIS — R97.20 ELEVATED PSA, LESS THAN 10 NG/ML: ICD-10-CM

## 2017-10-16 DIAGNOSIS — I10 ESSENTIAL HYPERTENSION: Primary | Chronic | ICD-10-CM

## 2017-10-16 DIAGNOSIS — Z23 ENCOUNTER FOR IMMUNIZATION: ICD-10-CM

## 2017-10-16 DIAGNOSIS — Z72.0 TOBACCO ABUSE DISORDER: ICD-10-CM

## 2017-10-16 RX ORDER — HYDROCHLOROTHIAZIDE 25 MG/1
25 TABLET ORAL DAILY
Qty: 30 TAB | Refills: 9 | Status: SHIPPED | OUTPATIENT
Start: 2017-10-16 | End: 2018-10-15 | Stop reason: SDUPTHER

## 2017-10-16 RX ORDER — BUDESONIDE AND FORMOTEROL FUMARATE DIHYDRATE 80; 4.5 UG/1; UG/1
2 AEROSOL RESPIRATORY (INHALATION) 2 TIMES DAILY
Qty: 1 INHALER | Refills: 0 | Status: SHIPPED | COMMUNITY
Start: 2017-10-16 | End: 2019-10-14 | Stop reason: SDUPTHER

## 2017-10-16 NOTE — PATIENT INSTRUCTIONS
Learning About Benefits From Quitting Smoking  How does quitting smoking make you healthier? If you're thinking about quitting smoking, you may have a few reasons to be smoke-free. Your health may be one of them. · When you quit smoking, you lower your risks for cancer, lung disease, heart attack, stroke, blood vessel disease, and blindness from macular degeneration. · When you're smoke-free, you get sick less often, and you heal faster. You are less likely to get colds, flu, bronchitis, and pneumonia. · As a nonsmoker, you may find that your mood is better and you are less stressed. When and how will you feel healthier? Quitting has real health benefits that start from day 1 of being smoke-free. And the longer you stay smoke-free, the healthier you get and the better you feel. The first hours  · After just 20 minutes, your blood pressure and heart rate go down. That means there's less stress on your heart and blood vessels. · Within 12 hours, the level of carbon monoxide in your blood drops back to normal. That makes room for more oxygen. With more oxygen in your body, you may notice that you have more energy than when you smoked. After 2 weeks  · Your lungs start to work better. · Your risk of heart attack starts to drop. After 1 month  · When your lungs are clear, you cough less and breathe deeper, so it's easier to be active. · Your sense of taste and smell return. That means you can enjoy food more than you have since you started smoking. Over the years  · After 1 year, your risk of heart disease is half what it would be if you kept smoking. · After 5 years, your risk of stroke starts to shrink. Within a few years after that, it's about the same as if you'd never smoked. · After 10 years, your risk of dying from lung cancer is cut by about half. And your risk for many other types of cancer is lower too. How would quitting help others in your life?   When you quit smoking, you improve the health of everyone who now breathes in your smoke. · Their heart, lung, and cancer risks drop, much like yours. · They are sick less. For babies and small children, living smoke-free means they're less likely to have ear infections, pneumonia, and bronchitis. · If you're a woman who is or will be pregnant someday, quitting smoking means a healthier . · Children who are close to you are less likely to become adult smokers. Where can you learn more? Go to http://caprice-erich.info/. Enter [id number] [id number] [id number] in the search box to learn more about \"Learning About Benefits From Quitting Smoking. \"  Current as of: 2017  Content Version: 11.3  © 0437-7364 [employer]. Care instructions adapted under license by [employer] (which disclaims liability or warranty for this information). If you have questions about a medical condition or this instruction, always ask your healthcare professional. [place] [address] any warranty or liability for your use of this information. Shingles: Care Instructions  Your Care Instructions    Shingles (herpes zoster) causes pain and a blistered rash. The rash can appear anywhere on the body but will be on only one side of the body, the left or right. It will be in a band, a strip, or a small area. The pain can be very severe. Shingles can also cause tingling or itching in the area of the rash. The blisters scab over after a few days and heal in 2 to 4 weeks. Medicines can help you feel better and may help prevent more serious problems caused by shingles. Shingles is caused by the same virus that causes chickenpox. When you have chickenpox, the virus gets into your nerve roots and stays there (becomes dormant) long after you get over the chickenpox. If the virus becomes active again, it can cause shingles. Follow-up care is a key part of your treatment and safety.  Be sure to make and go to all appointments, and call your doctor if you are having problems. It's also a good idea to know your test results and keep a list of the medicines you take. How can you care for yourself at home? · Be safe with medicines. Take your medicines exactly as prescribed. Call your doctor if you think you are having a problem with your medicine. Antiviral medicine helps you get better faster. · Try not to scratch or pick at the blisters. They will crust over and fall off on their own if you leave them alone. · Put cool, wet cloths on the area to relieve pain and itching. You can also use calamine lotion. Try not to use so much lotion that it cakes and is hard to get off. · Put cornstarch or baking soda on the sores to help dry them out so they heal faster. · Do not use thick ointment, such as petroleum jelly, on the sores. This will keep them from drying and healing. · To help remove loose crusts, soak them in tap water. This can help decrease oozing, and dry and soothe the skin. · Take an over-the-counter pain medicine, such as acetaminophen (Tylenol), ibuprofen (Advil, Motrin), or naproxen (Aleve). Read and follow all instructions on the label. · Avoid close contact with people until the blisters have healed. It is very important for you to avoid contact with anyone who has never had chickenpox or the chickenpox vaccine. Pregnant women, young babies, and anyone else who has a hard time fighting infection (such as someone with HIV, diabetes, or cancer) is especially at risk. When should you call for help? Call your doctor now or seek immediate medical care if:  · You have a new or higher fever. · You have a severe headache and a stiff neck. · You lose the ability to think clearly. · The rash spreads to your forehead, nose, eyes, or eyelids. · You have eye pain, or your vision gets worse. · You have new pain in your face, or you cannot move the muscles in your face. · Blisters spread to new parts of your body.   Watch closely for changes in your health, and be sure to contact your doctor if:  · The rash has not healed after 2 to 4 weeks. · You still have pain after the rash has healed. Where can you learn more? Go to http://caprice-reich.info/. Gadiel Roberts in the search box to learn more about \"Shingles: Care Instructions. \"  Current as of: March 3, 2017  Content Version: 11.3  © 3168-9521 [employer]. Care instructions adapted under license by [employer] (which disclaims liability or warranty for this information). If you have questions about a medical condition or this instruction, always ask your healthcare professional. [address] any warranty or liability for your use of this information. Try to check blood pressure few times each week. Bring results to next visit. Would wait about 4 weeks until after shingles rash has resolved before getting vaccine.

## 2017-10-16 NOTE — PROGRESS NOTES
Freya Peralta is a 69 y.o. male who presents for evaluation of routine follow up. Last seen by me July 13, 2017. bp was 150/84. Since then, had shingles about 4 weeks ago. Went to UC, but not until after having had rash for about 2 weeks. It continues to improve, not causing him much distress. ROS:  Constitutional: negative for fevers, chills, anorexia and weight loss  Eyes:   negative for visual disturbance and irritation  ENT:   negative for tinnitus,sore throat,nasal congestion,ear pain,hoarseness  Respiratory:  negative for cough, hemoptysis, dyspnea,wheezing  CV:   negative for chest pain, palpitations, lower extremity edema  GI:   negative for nausea, vomiting, diarrhea, abdominal pain,melena  Genitourinary: negative for frequency, dysuria and hematuria  Musculoskel: negative for myalgias, arthralgias, back pain, muscle weakness, joint pain  Neurological:  negative for headaches, dizziness, focal weakness, numbness  Psychiatric:     Negative for depression or anxiety      Past Medical History:   Diagnosis Date    Diverticulitis     Hypertension     Ill-defined condition     kidney stones/ stant placed       Past Surgical History:   Procedure Laterality Date    COLONOSCOPY N/A 11/30/2016    COLONOSCOPY performed by Magdaleno Councilman, MD at . [facility],FORCEP/CAUTERY  11/30/2016         HX BACK SURGERY      plate to upper neck    HX CERVICAL FUSION      HX ORTHOPAEDIC      left ring finger-trauma    HX ORTHOPAEDIC      right index finger    HX ORTHOPAEDIC  12/2/14    Bilateral total knee replacements    HX TONSILLECTOMY      HX UROLOGICAL      kidney stone extraction/stent       Family History   Problem Relation Age of Onset    No Known Problems Mother     Emphysema Father        Social History     Social History    Marital status:      Spouse name: N/A    Number of children: N/A    Years of education: N/A     Occupational History    Not on file. Social History Main Topics    Smoking status: Current Every Day Smoker     Packs/day: 1.50     Years: 50.00     Types: Cigarettes    Smokeless tobacco: Never Used      Comment: electronic cigarettes in the past    Alcohol use 1.8 oz/week     3 Cans of beer per week      Comment: social -3 drinks weekly--rum    Drug use: No    Sexual activity: Yes     Partners: Female     Other Topics Concern    Not on file     Social History Narrative            Visit Vitals    /90 (BP 1 Location: Left arm, BP Patient Position: Sitting)    Pulse 77    Temp 97.7 °F (36.5 °C) (Oral)    Resp 16    Ht 5' 8\" (1.727 m)    Wt 175 lb (79.4 kg)    SpO2 93%    BMI 26.61 kg/m2       Physical Examination:   General - Well appearing male  HEENT - PERRL, TM no erythema/opacification, normal nasal turbinates, no oropharyngeal erythema or exudate, MMM  Neck - supple, no bruits, no thyroidomegaly, no lymphadenopathy  Pulm - clear to auscultation bilaterally  Cardio - RRR, normal S1 S2, no murmur  Abd - soft, nontender, no masses, no HSM  Extrem - no edema, +2 distal pulses  Neuro-  No focal deficits, CN intact     Assessment/Plan:    1.  htn--start hctz. Monitor at home. 2.  Recent shingles--rx given for zoster vaccine, to get 4 weeks after rash has resolved  3. Tobacco abuse--info given on smoking cessation  4. Sob/solo, probable copd--improved with symbicort. Samples given  5. Elevated psa--4.6, check again with next labs. Has some nocturia, but no other symptoms  6. Routine adult health maintenance--flu shot given today. Has not seen eye doctor yet. rtc 3 months, check labs then.         Hugo Aj III, DO

## 2017-10-16 NOTE — PROGRESS NOTES
Reviewed record in preparation for visit and have obtained necessary documentation. Identified pt with two pt identifiers(name and ). Chief Complaint   Patient presents with    Cholesterol Problem     follow up       Health Maintenance Due   Topic Date Due    GLAUCOMA SCREENING Q2Y  2013    INFLUENZA AGE 9 TO ADULT  2017       Mr. Radha Devlin has a reminder for a \"due or due soon\" health maintenance. I have asked that he discuss health maintenance topic(s) due with His  primary care provider. Coordination of Care Questionnaire:  :     1) Have you been to an emergency room, urgent care clinic since your last visit? yes   Hospitalized since your last visit? no             2) Have you seen or consulted any other health care providers outside of [address] [place] [address] since your last visit? no  (Include any pap smears or colon screenings in this section.)    3) Do you have an Advance Directive on file? yes    4) Are you interested in receiving information on Advance Directives? NO    Patient is accompanied by self I have received verbal consent from Margaret Dawn to discuss any/all medical information while they are present in the room.

## 2017-10-16 NOTE — MR AVS SNAPSHOT
Visit Information Date & Time Provider Department Dept. Phone Encounter #  
 10/16/2017  8:00 AM Travon Green, [address] [place] [address] [id number] Follow-up Instructions Return in about 3 months (around 1/16/2018) for htn. Upcoming Health Maintenance Date Due  
 GLAUCOMA SCREENING Q2Y 8/28/2013 Pneumococcal 65+ Low/Medium Risk (2 of 2 - PPSV23) 1/13/2018 MEDICARE YEARLY EXAM 7/14/2018 COLONOSCOPY 11/30/2021 DTaP/Tdap/Td series (2 - Td) 1/13/2027 Allergies as of 10/16/2017  Review Complete On: 10/16/2017 By: Chai Clark III, DO No Known Allergies Current Immunizations  Reviewed on 12/2/2014 No immunizations on file. Not reviewed this visit You Were Diagnosed With   
  
 Codes Comments Essential hypertension    -  Primary ICD-10-CM: I10 
ICD-9-CM: 401.9 Tobacco abuse disorder     ICD-10-CM: Z72.0 ICD-9-CM: 305.1 Elevated PSA, less than 10 ng/ml     ICD-10-CM: R97.20 ICD-9-CM: 790.93 Herpes zoster without complication     VEI-91-SG: B02.9 ICD-9-CM: 667. 9 Vitals BP Pulse Temp Resp Height(growth percentile) Weight(growth percentile) 150/90 (BP 1 Location: Left arm, BP Patient Position: Sitting) 77 97.7 °F (36.5 °C) (Oral) 16 5' 8\" (1.727 m) 175 lb (79.4 kg) SpO2 BMI Smoking Status 93% 26.61 kg/m2 Current Every Day Smoker Vitals History BMI and BSA Data Body Mass Index Body Surface Area  
 26.61 kg/m 2 1.95 m 2 Preferred Pharmacy Pharmacy Name Phone [facility] [phone] Your Updated Medication List  
  
   
This list is accurate as of: 10/16/17  8:28 AM.  Always use your most recent med list.  
  
  
  
  
 acetaminophen 325 mg tablet Commonly known as:  TYLENOL Take 2 Tabs by mouth every six (6) hours as needed. aspirin delayed-release 81 mg tablet Take 1 Tab by mouth daily. atorvastatin 20 mg tablet Commonly known as:  LIPITOR Take 1 Tab by mouth daily. budesonide-formoterol 80-4.5 mcg/actuation Hfaa Commonly known as:  SYMBICORT Take 2 Puffs by inhalation two (2) times a day. diclofenac 1 % Gel Commonly known as:  VOLTAREN Apply  to affected area four (4) times daily. hydroCHLOROthiazide 25 mg tablet Commonly known as:  HYDRODIURIL Take 1 Tab by mouth daily. Indications: hypertension  
  
 varicella zoster vacine live 19,400 unit/0.65 mL Susr injection Commonly known as:  ZOSTAVAX  
1 Vial by SubCUTAneous route once for 1 dose. Prescriptions Printed Refills  
 varicella zoster vacine live (ZOSTAVAX) 19,400 unit/0.65 mL susr injection 0 Si Vial by SubCUTAneous route once for 1 dose. Class: Print Route: SubCUTAneous Prescriptions Sent to Pharmacy Refills  
 hydroCHLOROthiazide (HYDRODIURIL) 25 mg tablet 9 Sig: Take 1 Tab by mouth daily. Indications: hypertension Class: Normal  
 Pharmacy: [id number] [address] [place] [address],[address] #: [phone] Route: Oral  
  
Follow-up Instructions Return in about 3 months (around 2018) for htn. Patient Instructions Learning About Benefits From Quitting Smoking How does quitting smoking make you healthier? If you're thinking about quitting smoking, you may have a few reasons to be smoke-free. Your health may be one of them. · When you quit smoking, you lower your risks for cancer, lung disease, heart attack, stroke, blood vessel disease, and blindness from macular degeneration. · When you're smoke-free, you get sick less often, and you heal faster. You are less likely to get colds, flu, bronchitis, and pneumonia. · As a nonsmoker, you may find that your mood is better and you are less stressed. When and how will you feel healthier? Quitting has real health benefits that start from day 1 of being smoke-free. And the longer you stay smoke-free, the healthier you get and the better you feel. The first hours · After just 20 minutes, your blood pressure and heart rate go down. That means there's less stress on your heart and blood vessels. · Within 12 hours, the level of carbon monoxide in your blood drops back to normal. That makes room for more oxygen. With more oxygen in your body, you may notice that you have more energy than when you smoked. After 2 weeks · Your lungs start to work better. · Your risk of heart attack starts to drop. After 1 month · When your lungs are clear, you cough less and breathe deeper, so it's easier to be active. · Your sense of taste and smell return. That means you can enjoy food more than you have since you started smoking. Over the years · After 1 year, your risk of heart disease is half what it would be if you kept smoking. · After 5 years, your risk of stroke starts to shrink. Within a few years after that, it's about the same as if you'd never smoked. · After 10 years, your risk of dying from lung cancer is cut by about half. And your risk for many other types of cancer is lower too. How would quitting help others in your life? When you quit smoking, you improve the health of everyone who now breathes in your smoke. · Their heart, lung, and cancer risks drop, much like yours. · They are sick less. For babies and small children, living smoke-free means they're less likely to have ear infections, pneumonia, and bronchitis. · If you're a woman who is or will be pregnant someday, quitting smoking means a healthier . · Children who are close to you are less likely to become adult smokers. Where can you learn more? Go to http://caprice-erich.info/. Enter [id number] in the search box to learn more about \"Learning About Benefits From Quitting Smoking. \" 
 Current as of: March 20, 2017 Content Version: 11.3 © 0934-9013 [employer]. Care instructions adapted under license by [employer] (which disclaims liability or warranty for this information). If you have questions about a medical condition or this instruction, always ask your healthcare professional. [address] any warranty or liability for your use of this information. Shingles: Care Instructions Your Care Instructions Shingles (herpes zoster) causes pain and a blistered rash. The rash can appear anywhere on the body but will be on only one side of the body, the left or right. It will be in a band, a strip, or a small area. The pain can be very severe. Shingles can also cause tingling or itching in the area of the rash. The blisters scab over after a few days and heal in 2 to 4 weeks. Medicines can help you feel better and may help prevent more serious problems caused by shingles. Shingles is caused by the same virus that causes chickenpox. When you have chickenpox, the virus gets into your nerve roots and stays there (becomes dormant) long after you get over the chickenpox. If the virus becomes active again, it can cause shingles. Follow-up care is a key part of your treatment and safety. Be sure to make and go to all appointments, and call your doctor if you are having problems. It's also a good idea to know your test results and keep a list of the medicines you take. How can you care for yourself at home? · Be safe with medicines. Take your medicines exactly as prescribed. Call your doctor if you think you are having a problem with your medicine. Antiviral medicine helps you get better faster. · Try not to scratch or pick at the blisters. They will crust over and fall off on their own if you leave them alone. · Put cool, wet cloths on the area to relieve pain and itching.  You can also use calamine lotion. Try not to use so much lotion that it cakes and is hard to get off. · Put cornstarch or baking soda on the sores to help dry them out so they heal faster. · Do not use thick ointment, such as petroleum jelly, on the sores. This will keep them from drying and healing. · To help remove loose crusts, soak them in tap water. This can help decrease oozing, and dry and soothe the skin. · Take an over-the-counter pain medicine, such as acetaminophen (Tylenol), ibuprofen (Advil, Motrin), or naproxen (Aleve). Read and follow all instructions on the label. · Avoid close contact with people until the blisters have healed. It is very important for you to avoid contact with anyone who has never had chickenpox or the chickenpox vaccine. Pregnant women, young babies, and anyone else who has a hard time fighting infection (such as someone with HIV, diabetes, or cancer) is especially at risk. When should you call for help? Call your doctor now or seek immediate medical care if: 
· You have a new or higher fever. · You have a severe headache and a stiff neck. · You lose the ability to think clearly. · The rash spreads to your forehead, nose, eyes, or eyelids. · You have eye pain, or your vision gets worse. · You have new pain in your face, or you cannot move the muscles in your face. · Blisters spread to new parts of your body. Watch closely for changes in your health, and be sure to contact your doctor if: · The rash has not healed after 2 to 4 weeks. · You still have pain after the rash has healed. Where can you learn more? Go to http://caprice-erich.info/. Ruth Hoang in the search box to learn more about \"Shingles: Care Instructions. \" Current as of: March 3, 2017 Content Version: 11.3 © 0401-8981 [employer].  Care instructions adapted under license by [employer] (which disclaims liability or warranty for this information). If you have questions about a medical condition or this instruction, always ask your healthcare professional. [address] any warranty or liability for your use of this information. Try to check blood pressure few times each week. Bring results to next visit. Would wait about 4 weeks until after shingles rash has resolved before getting vaccine. Introducing [facility] SERVICES! [facility] introduces [employer] patient portal. Now you can access parts of your medical record, email your doctor's office, and request medication refills online. 1. In your internet browser, go to [email or web address] 2. Click on the First Time User? Click Here link in the Sign In box. You will see the New Member Sign Up page. 3. Enter your [employer] Access Code exactly as it appears below. You will not need to use this code after youve completed the sign-up process. If you do not sign up before the expiration date, you must request a new code. · [employer] Access Code: [facility] Expires: 1/14/2018  8:28 AM 
 
4. Enter the last four digits of your Social Security Number (xxxx) and Date of Birth (mm/dd/yyyy) as indicated and click Submit. You will be taken to the next sign-up page. 5. Create a [employer] ID. This will be your [employer] login ID and cannot be changed, so think of one that is secure and easy to remember. 6. Create a [employer] password. You can change your password at any time. 7. Enter your Password Reset Question and Answer. This can be used at a later time if you forget your password. 8. Enter your e-mail address. You will receive e-mail notification when new information is available in [address] 9. Click Sign Up. You can now view and download portions of your medical record. 10. Click the Download Summary menu link to download a portable copy of your medical information. If you have questions, please visit the Frequently Asked Questions section of the [employer] website. Remember, [employer] is NOT to be used for urgent needs. For medical emergencies, dial 911. Now available from your iPhone and Android! Please provide this summary of care documentation to your next provider. Your primary care clinician is listed as Dianne Jovel If you have any questions after today's visit, please call [phone].

## 2018-01-16 ENCOUNTER — OFFICE VISIT (OUTPATIENT)
Dept: INTERNAL MEDICINE CLINIC | Age: 68
End: 2018-01-16

## 2018-01-16 ENCOUNTER — HOSPITAL ENCOUNTER (OUTPATIENT)
Dept: LAB | Age: 68
Discharge: HOME | End: 2018-01-16
Payer: MEDICARE

## 2018-01-16 VITALS
HEIGHT: 68 IN | BODY MASS INDEX: 26.37 KG/M2 | SYSTOLIC BLOOD PRESSURE: 124 MMHG | WEIGHT: 174 LBS | OXYGEN SATURATION: 95 % | TEMPERATURE: 97.9 F | DIASTOLIC BLOOD PRESSURE: 85 MMHG | RESPIRATION RATE: 16 BRPM | HEART RATE: 79 BPM

## 2018-01-16 DIAGNOSIS — B02.9 HERPES ZOSTER WITHOUT COMPLICATION: ICD-10-CM

## 2018-01-16 DIAGNOSIS — Z72.0 TOBACCO ABUSE DISORDER: ICD-10-CM

## 2018-01-16 DIAGNOSIS — M19.90 ARTHRITIS: ICD-10-CM

## 2018-01-16 DIAGNOSIS — J41.0 SIMPLE CHRONIC BRONCHITIS (HCC): Primary | ICD-10-CM

## 2018-01-16 DIAGNOSIS — I10 ESSENTIAL HYPERTENSION: Primary | Chronic | ICD-10-CM

## 2018-01-16 DIAGNOSIS — Z00.00 ROUTINE ADULT HEALTH MAINTENANCE: ICD-10-CM

## 2018-01-16 PROCEDURE — 80053 COMPREHEN METABOLIC PANEL: CPT

## 2018-01-16 PROCEDURE — 85025 COMPLETE CBC W/AUTO DIFF WBC: CPT

## 2018-01-16 PROCEDURE — 84153 ASSAY OF PSA TOTAL: CPT

## 2018-01-16 PROCEDURE — 83036 HEMOGLOBIN GLYCOSYLATED A1C: CPT

## 2018-01-16 PROCEDURE — 80061 LIPID PANEL: CPT

## 2018-01-16 PROCEDURE — 36415 COLL VENOUS BLD VENIPUNCTURE: CPT

## 2018-01-16 PROCEDURE — 84443 ASSAY THYROID STIM HORMONE: CPT

## 2018-01-16 RX ORDER — BUDESONIDE AND FORMOTEROL FUMARATE DIHYDRATE 160; 4.5 UG/1; UG/1
2 AEROSOL RESPIRATORY (INHALATION) 2 TIMES DAILY
Qty: 2 INHALER | Refills: 0 | Status: SHIPPED | COMMUNITY
Start: 2018-01-16 | End: 2018-04-05 | Stop reason: SDUPTHER

## 2018-01-16 NOTE — PATIENT INSTRUCTIONS
Osteoarthritis: Care Instructions  Your Care Instructions    Arthritis is a common health problem in which the joints are inflamed. There are several kinds of arthritis. Osteoarthritis is caused by a breakdown of cartilage, the hard, thick tissue that cushions the joints. It causes pain, stiffness, and swelling, often in the spine, fingers, hips, and knees. Osteoarthritis can happen at any age, but it is most common in older people. Osteoarthritis never goes away completely, but it can be controlled. Medicine and home treatment can reduce the pain and prevent the arthritis from getting worse. Follow-up care is a key part of your treatment and safety. Be sure to make and go to all appointments, and call your doctor if you are having problems. It's also a good idea to know your test results and keep a list of the medicines you take. How can you care for yourself at home? · Take a warm shower or bath in the morning to relieve stiffness. Avoid sitting still afterwards. · If the joint is not swollen, use moist heat, like a warm, damp towel, for 20 to 30 minutes, 2 or 3 times a day. Do not use heat on a swollen joint. · If the joint is swollen, use ice or cold packs for 10 to 20 minutes, once an hour. Cold will help relieve pain and reduce inflammation. Put a thin cloth between the ice and your skin. · To prevent stiffness, gently move the joint through its full range of motion several times a day. · If the joint hurts, avoid activities that put a strain on it for a few days. Take rest breaks throughout the day. · Get regular exercise. Walking, swimming, yoga, biking, carly chi, and water aerobics are good exercises that are gentle on the joints. · Reach and stay at a healthy weight. If you need to lose or maintain weight, regular exercise and a healthy diet will help. Extra weight can strain the joints, especially the knees and hips, and make the pain worse. Losing even a few pounds may help.   · Take pain medicines exactly as directed. ¨ If the doctor gave you a prescription medicine for pain, take it as prescribed. ¨ If you are not taking a prescription pain medicine, ask your doctor if you can take an over-the-counter medicine. When should you call for help? Call your doctor now or seek immediate medical care if:  ? · The pain is so bad that you cannot use the joint. ? · You have sudden back pain with weakness in your legs or loss of bowel or bladder control. ? · Your stools are black and tarlike or have streaks of blood. ? · You have severe pain and swelling in more than one joint. ? Watch closely for changes in your health, and be sure to contact your doctor if:  ? · You have side effects from the medicines, like belly pain, ongoing heartburn, or nausea. ? · Joint pain continues for more than 6 weeks, and home treatment is not helping. Where can you learn more? Go to http://caprice-erich.info/. Enter T809 in the search box to learn more about \"Osteoarthritis: Care Instructions. \"  Current as of: October 31, 2016  Content Version: 11.4  © 7641-6137 [employer]. Care instructions adapted under license by [employer] (which disclaims liability or warranty for this information). If you have questions about a medical condition or this instruction, always ask your healthcare professional. [address] any warranty or liability for your use of this information. Try to get eye exam done, screen for glaucoma recommended every 2 years.

## 2018-01-16 NOTE — PROGRESS NOTES
Reviewed record in preparation for visit and have obtained necessary documentation. Identified pt with two pt identifiers(name and ). Chief Complaint   Patient presents with    Follow-up       Health Maintenance Due   Topic Date Due    GLAUCOMA SCREENING Q2Y  2013    Pneumococcal 65+ Low/Medium Risk (2 of 2 - PPSV23) 2018       Mr. Charlene Schaumann has a reminder for a \"due or due soon\" health maintenance. I have asked that he discuss health maintenance topic(s) due with His  primary care provider. Coordination of Care Questionnaire:  :     1) Have you been to an emergency room, urgent care clinic since your last visit? no   Hospitalized since your last visit? no             2) Have you seen or consulted any other health care providers outside of [address] [place] [address] since your last visit? no  (Include any pap smears or colon screenings in this section.)    3) Do you have an Advance Directive on file? yes    4) Are you interested in receiving information on Advance Directives? NO    Patient is accompanied by self I have received verbal consent from Yovany Navarrete to discuss any/all medical information while they are present in the room.

## 2018-01-16 NOTE — MR AVS SNAPSHOT
[address] [facility] [address] [facility] 
[phone] Patient: Darcy Beck MRN: [id number] ZQQ:[birth year] Visit Information Date & Time Provider Department Dept. Phone Encounter #  
 1/16/2018  8:00 AM Charyludy Ramirez, [address] [facility] [id number] Follow-up Instructions Return in about 6 months (around 7/16/2018). Follow-up and Disposition History Upcoming Health Maintenance Date Due  
 GLAUCOMA SCREENING Q2Y 8/28/2013 MEDICARE YEARLY EXAM 7/14/2018 COLONOSCOPY 11/30/2021 DTaP/Tdap/Td series (2 - Td) 1/13/2027 Allergies as of 1/16/2018  Review Complete On: 1/16/2018 By: Julissa Laurent III, DO No Known Allergies Current Immunizations  Reviewed on 12/2/2014 Name Date Influenza High Dose Vaccine PF 10/16/2017 Not reviewed this visit You Were Diagnosed With   
  
 Codes Comments Essential hypertension    -  Primary ICD-10-CM: I10 
ICD-9-CM: 401.9 Arthritis     ICD-10-CM: M19.90 ICD-9-CM: 716.90 Tobacco abuse disorder     ICD-10-CM: Z72.0 ICD-9-CM: 305.1 Herpes zoster without complication     LPG-05-RS: B02.9 ICD-9-CM: 053.9 Routine adult health maintenance     ICD-10-CM: Z00.00 ICD-9-CM: V70.0 Vitals BP Pulse Temp Resp Height(growth percentile) Weight(growth percentile) 124/85 (BP 1 Location: Right arm, BP Patient Position: Sitting) 79 97.9 °F (36.6 °C) (Oral) 16 5' 8\" (1.727 m) 174 lb (78.9 kg) SpO2 BMI Smoking Status 95% 26.46 kg/m2 Current Every Day Smoker Vitals History BMI and BSA Data Body Mass Index Body Surface Area  
 26.46 kg/m 2 1.95 m 2 Your Updated Medication List  
  
   
This list is accurate as of: 1/16/18 12:13 PM.  Always use your most recent med list.  
  
  
  
  
 acetaminophen 325 mg tablet Commonly known as:  TYLENOL  
 Take 2 Tabs by mouth every six (6) hours as needed. aspirin delayed-release 81 mg tablet Take 1 Tab by mouth daily. atorvastatin 20 mg tablet Commonly known as:  LIPITOR Take 1 Tab by mouth daily. * budesonide-formoterol 80-4.5 mcg/actuation Hfaa Commonly known as:  SYMBICORT Take 2 Puffs by inhalation two (2) times a day. * budesonide-formoterol 80-4.5 mcg/actuation Hfaa Commonly known as:  SYMBICORT Take 2 Puffs by inhalation two (2) times a day. * budesonide-formoterol 160-4.5 mcg/actuation Hfaa Commonly known as:  SYMBICORT Take 2 Puffs by inhalation two (2) times a day. diclofenac 1 % Gel Commonly known as:  VOLTAREN Apply  to affected area four (4) times daily. hydroCHLOROthiazide 25 mg tablet Commonly known as:  HYDRODIURIL Take 1 Tab by mouth daily. Indications: hypertension * Notice: This list has 3 medication(s) that are the same as other medications prescribed for you. Read the directions carefully, and ask your doctor or other care provider to review them with you. We Performed the Following CBC WITH AUTOMATED DIFF [31210 CPT(R)] HEMOGLOBIN A1C WITH EAG [78374 CPT(R)] LIPID PANEL [20327 CPT(R)] LYME AB, IGG & IGM BY WB [03613 CPT(R)] METABOLIC PANEL, COMPREHENSIVE [57631 CPT(R)] PSA, DIAGNOSTIC (PROSTATE SPECIFIC AG) [id number] CPT(R)] TSH 3RD GENERATION [72952 CPT(R)] Follow-up Instructions Return in about 6 months (around 7/16/2018). Patient Instructions Osteoarthritis: Care Instructions Your Care Instructions Arthritis is a common health problem in which the joints are inflamed. There are several kinds of arthritis. Osteoarthritis is caused by a breakdown of cartilage, the hard, thick tissue that cushions the joints. It causes pain, stiffness, and swelling, often in the spine, fingers, hips, and knees.  Osteoarthritis can happen at any age, but it is most common in older people. Osteoarthritis never goes away completely, but it can be controlled. Medicine and home treatment can reduce the pain and prevent the arthritis from getting worse. Follow-up care is a key part of your treatment and safety. Be sure to make and go to all appointments, and call your doctor if you are having problems. It's also a good idea to know your test results and keep a list of the medicines you take. How can you care for yourself at home? · Take a warm shower or bath in the morning to relieve stiffness. Avoid sitting still afterwards. · If the joint is not swollen, use moist heat, like a warm, damp towel, for 20 to 30 minutes, 2 or 3 times a day. Do not use heat on a swollen joint. · If the joint is swollen, use ice or cold packs for 10 to 20 minutes, once an hour. Cold will help relieve pain and reduce inflammation. Put a thin cloth between the ice and your skin. · To prevent stiffness, gently move the joint through its full range of motion several times a day. · If the joint hurts, avoid activities that put a strain on it for a few days. Take rest breaks throughout the day. · Get regular exercise. Walking, swimming, yoga, biking, carly chi, and water aerobics are good exercises that are gentle on the joints. · Reach and stay at a healthy weight. If you need to lose or maintain weight, regular exercise and a healthy diet will help. Extra weight can strain the joints, especially the knees and hips, and make the pain worse. Losing even a few pounds may help. · Take pain medicines exactly as directed. ¨ If the doctor gave you a prescription medicine for pain, take it as prescribed. ¨ If you are not taking a prescription pain medicine, ask your doctor if you can take an over-the-counter medicine. When should you call for help? Call your doctor now or seek immediate medical care if: 
? · The pain is so bad that you cannot use the joint. ? · You have sudden back pain with weakness in your legs or loss of bowel or bladder control. ? · Your stools are black and tarlike or have streaks of blood. ? · You have severe pain and swelling in more than one joint. ? Watch closely for changes in your health, and be sure to contact your doctor if: 
? · You have side effects from the medicines, like belly pain, ongoing heartburn, or nausea. ? · Joint pain continues for more than 6 weeks, and home treatment is not helping. Where can you learn more? Go to http://caprice-erich.info/. Enter A068 in the search box to learn more about \"Osteoarthritis: Care Instructions. \" Current as of: October 31, 2016 Content Version: 11.4 © 3658-8212 [employer]. Care instructions adapted under license by [employer] (which disclaims liability or warranty for this information). If you have questions about a medical condition or this instruction, always ask your healthcare professional. [place] [address] any warranty or liability for your use of this information. Try to get eye exam done, screen for glaucoma recommended every 2 years. Patient Instructions History Introducing [facility]! Otoniel Pugh introduces i-drive patient portal. Now you can access parts of your medical record, email your doctor's office, and request medication refills online. 1. In your internet browser, go to [email or web address] 2. Click on the First Time User? Click Here link in the Sign In box. You will see the New Member Sign Up page. 3. Enter your i-drive Access Code exactly as it appears below. You will not need to use this code after youve completed the sign-up process. If you do not sign up before the expiration date, you must request a new code. · i-drive Access Code: [email or web address] Expires: 4/16/2018  8:18 AM 
 
4.  Enter the last four digits of your Social Security Number (xxxx) and Date of Birth (mm/dd/yyyy) as indicated and click Submit. You will be taken to the next sign-up page. 5. Create a [employer] ID. This will be your [employer] login ID and cannot be changed, so think of one that is secure and easy to remember. 6. Create a [employer] password. You can change your password at any time. 7. Enter your Password Reset Question and Answer. This can be used at a later time if you forget your password. 8. Enter your e-mail address. You will receive e-mail notification when new information is available in [address] 9. Click Sign Up. You can now view and download portions of your medical record. 10. Click the Download Summary menu link to download a portable copy of your medical information. If you have questions, please visit the Frequently Asked Questions section of the [employer] website. Remember, [employer] is NOT to be used for urgent needs. For medical emergencies, dial 911. Now available from your iPhone and Android! Please provide this summary of care documentation to your next provider. Lyme Disease Testing Disclaimer:   
 § 91.5-3464.7. (Expires July 1, 2018) Lyme disease testing information disclosure. A. Every licensee or his in-office designee who orders a laboratory test for the presence of Lyme disease shall provide to the patient or his legal representative the following written information: \"ACCORDING TO THE CENTERS FOR DISEASE CONTROL AND PREVENTION, AS OF 2011 LYME DISEASE IS THE SIXTH FASTEST GROWING DISEASE IN THE [place]. YOUR HEALTH CARE PROVIDER HAS ORDERED A LABORATORY TEST FOR THE PRESENCE OF LYME DISEASE FOR YOU. CURRENT LABORATORY TESTING FOR LYME DISEASE CAN BE PROBLEMATIC AND STANDARD LABORATORY TESTS OFTEN RESULT IN FALSE NEGATIVE AND FALSE POSITIVE RESULTS, AND IF DONE TOO EARLY, YOU MAY NOT HAVE PRODUCED ENOUGH ANTIBODIES TO BE CONSIDERED POSITIVE BECAUSE YOUR IMMUNE RESPONSE REQUIRES TIME TO DEVELOP ANTIBODIES.  IF YOU ARE TESTED FOR LYME DISEASE, AND THE RESULTS ARE NEGATIVE, THIS DOES NOT NECESSARILY MEAN YOU DO NOT HAVE LYME DISEASE. IF YOU CONTINUE TO EXPERIENCE SYMPTOMS, YOU SHOULD CONTACT YOUR HEALTH CARE PROVIDER AND INQUIRE ABOUT THE APPROPRIATENESS OF RETESTING OR ADDITIONAL TREATMENT. \"  
B. Licensees shall be immune from civil liability for the provision of the written information required by this section absent gross negligence or willful misconduct. Your primary care clinician is listed as Leanna Fermin If you have any questions after today's visit, please call [phone].

## 2018-01-16 NOTE — PROGRESS NOTES
Severo Vega is a 69 y.o. male who presents for evaluation of routine follow up. Last seen by me oct 16, 2017. bp then was 150/90, started hctz. Tolerating well. Main concern today is osteoarthritis complaints in both hands. Has also had numerous ticks, and wonders if lyme ds might be contributing.       ROS:  Constitutional: negative for fevers, chills, anorexia and weight loss  Eyes:   negative for visual disturbance and irritation  ENT:   negative for tinnitus,sore throat,nasal congestion,ear pain,hoarseness  Respiratory:  negative for cough, hemoptysis, dyspnea,wheezing  CV:   negative for chest pain, palpitations, lower extremity edema  GI:   negative for nausea, vomiting, diarrhea, abdominal pain,melena  Genitourinary: negative for frequency, dysuria and hematuria  Musculoskel: negative for myalgias, arthralgias, back pain, muscle weakness, joint pain  Neurological:  negative for headaches, dizziness, focal weakness, numbness  Psychiatric:     Negative for depression or anxiety      Past Medical History:   Diagnosis Date    Diverticulitis     Hypertension     Ill-defined condition     kidney stones/ stant placed       Past Surgical History:   Procedure Laterality Date    COLONOSCOPY N/A 11/30/2016    COLONOSCOPY performed by Papa Platt MD at . [facility] LESN,FORCEP/CAUTERY  11/30/2016         HX BACK SURGERY      plate to upper neck    HX CERVICAL FUSION      HX ORTHOPAEDIC      left ring finger-trauma    HX ORTHOPAEDIC      right index finger    HX ORTHOPAEDIC  12/2/14    Bilateral total knee replacements    HX TONSILLECTOMY      HX UROLOGICAL      kidney stone extraction/stent       Family History   Problem Relation Age of Onset    No Known Problems Mother     Emphysema Father        Social History     Social History    Marital status:      Spouse name: N/A    Number of children: N/A    Years of education: N/A     Occupational History    Not on file.     Social History Main Topics    Smoking status: Current Every Day Smoker     Packs/day: 2.00     Years: 50.00     Types: Cigarettes    Smokeless tobacco: Never Used      Comment: electronic cigarettes in the past    Alcohol use 1.8 oz/week     3 Cans of beer per week      Comment: social -3 drinks weekly--rum    Drug use: No    Sexual activity: Yes     Partners: Female     Other Topics Concern    Not on file     Social History Narrative            Visit Vitals    /85 (BP 1 Location: Right arm, BP Patient Position: Sitting)    Pulse 79    Temp 97.9 °F (36.6 °C) (Oral)    Resp 16    Ht 5' 8\" (1.727 m)    Wt 174 lb (78.9 kg)    SpO2 95%    BMI 26.46 kg/m2       Physical Examination:   General - Well appearing male  HEENT - PERRL, TM no erythema/opacification, normal nasal turbinates, no oropharyngeal erythema or exudate, MMM  Neck - supple, no bruits, no thyroidomegaly, no lymphadenopathy  Pulm - clear to auscultation bilaterally  Cardio - RRR, normal S1 S2, no murmur  Abd - soft, nontender, no masses, no HSM  Extrem - no edema, +2 distal pulses  Neuro-  No focal deficits, CN intact     Assessment/Plan:    1.  htn--much improved with hctz. Check cmp, cbc  2. Diffuse arthritis with hx of ticks--check lyme ds, cbc  3. Copd--much improved with symbicort  4. Tobacco abuse--still smokes some  5. Hx elevated psa--check again  6. Routine adult health maintenance--reminded about need for glaucoma screen. rtc 6 months.         Chip Tucker III, DO

## 2018-01-16 NOTE — LETTER
1/23/2018 4:31 PM 
 
Mr. Emmanuel Suarez [address] [address] Dear Emmanuel Suarez: 
 
Please find your most recent results below. Resulted Orders CBC WITH AUTOMATED DIFF Result Value Ref Range WBC 6.3 3.4 - 10.8 x10E3/uL  
 RBC 5.10 4.14 - 5.80 x10E6/uL HGB 16.5 13.0 - 17.7 g/dL HCT 49.9 37.5 - 51.0 % MCV 98 (H) 79 - 97 fL  
 MCH 32.4 26.6 - 33.0 pg  
 MCHC 33.1 31.5 - 35.7 g/dL  
 RDW 15.2 12.3 - 15.4 % PLATELET 267 435 - 824 x10E3/uL NEUTROPHILS 77 Not Estab. % Lymphocytes 12 Not Estab. % MONOCYTES 7 Not Estab. % EOSINOPHILS 4 Not Estab. % BASOPHILS 0 Not Estab. %  
 ABS. NEUTROPHILS 4.9 1.4 - 7.0 x10E3/uL Abs Lymphocytes 0.8 0.7 - 3.1 x10E3/uL  
 ABS. MONOCYTES 0.5 0.1 - 0.9 x10E3/uL  
 ABS. EOSINOPHILS 0.2 0.0 - 0.4 x10E3/uL  
 ABS. BASOPHILS 0.0 0.0 - 0.2 x10E3/uL IMMATURE GRANULOCYTES 0 Not Estab. %  
 ABS. IMM. GRANS. 0.0 0.0 - 0.1 x10E3/uL Narrative Performed at:  [address]  [id number] : Ketty Boone MD, Phone:  [phone] METABOLIC PANEL, COMPREHENSIVE Result Value Ref Range Glucose 83 65 - 99 mg/dL BUN 28 (H) 8 - 27 mg/dL Creatinine 1.12 0.76 - 1.27 mg/dL GFR est non-AA 67 >59 mL/min/1.73 GFR est AA 77 >59 mL/min/1.73  
 BUN/Creatinine ratio 25 (H) 10 - 24 Sodium 145 (H) 134 - 144 mmol/L Potassium 3.9 3.5 - 5.2 mmol/L Chloride 97 96 - 106 mmol/L  
 CO2 30 (H) 18 - 29 mmol/L Calcium 9.3 8.6 - 10.2 mg/dL Protein, total 6.1 6.0 - 8.5 g/dL Albumin 4.0 3.6 - 4.8 g/dL GLOBULIN, TOTAL 2.1 1.5 - 4.5 g/dL A-G Ratio 1.9 1.2 - 2.2 Bilirubin, total 0.4 0.0 - 1.2 mg/dL Alk. phosphatase 81 39 - 117 IU/L  
 AST (SGOT) 14 0 - 40 IU/L  
 ALT (SGPT) 10 0 - 44 IU/L Narrative Performed at:  [address]  [id number] : Ketty Boone MD, Phone:  [phone] LIPID PANEL  
 Result Value Ref Range Cholesterol, total 186 100 - 199 mg/dL Triglyceride 89 0 - 149 mg/dL HDL Cholesterol 64 >39 mg/dL VLDL, calculated 18 5 - 40 mg/dL LDL, calculated 104 (H) 0 - 99 mg/dL Narrative Performed at:  [address]  [id number] : Francisco Javier Regalado MD, Phone:  [phone] TSH 3RD GENERATION Result Value Ref Range TSH 2.210 0.450 - 4.500 uIU/mL Narrative Performed at:  [address]  [id number] : Francisco Javier Regalado MD, Phone:  [phone] HEMOGLOBIN A1C WITH EAG Result Value Ref Range Hemoglobin A1c 5.8 (H) 4.8 - 5.6 % Comment:  
            Pre-diabetes: 5.7 - 6.4 Diabetes: >6.4 Glycemic control for adults with diabetes: <7.0 Estimated average glucose 120 mg/dL Narrative Performed at:  [address]  [id number] : Francisco Javier Regalado MD, Phone:  [phone] PSA, DIAGNOSTIC (PROSTATE SPECIFIC AG) Result Value Ref Range Prostate Specific Ag 4.2 (H) 0.0 - 4.0 ng/mL Comment:  
   Roche ECLIA methodology. According to the American Urological Association, Serum PSA should 
decrease and remain at undetectable levels after radical 
prostatectomy. The AUA defines biochemical recurrence as an initial 
PSA value 0.2 ng/mL or greater followed by a subsequent confirmatory PSA value 0.2 ng/mL or greater. Values obtained with different assay methods or kits cannot be used 
interchangeably. Results cannot be interpreted as absolute evidence 
of the presence or absence of malignant disease. Narrative Performed at:  [address]  [id number] : Francisco Javier Regalado MD, Phone:  [phone] LYME AB, IGG & IGM BY WB Result Value Ref Range IgG P93 Ab Absent IgG P66 Ab Absent IgG P58 Ab Absent IgG P45 Ab Absent IgG P41 Ab Present (A) IgG P39 Ab Absent IgG P30 Ab Absent IgG P28 Ab Absent IgG P23 Ab Absent IgG P18 Ab Absent Lyme Ab, IgG WB Interp. Negative Comment:  
                        Positive: 5 of the following Borrelia-specific bands: 
                               18,23,28,30,39,41,45,58, 
                               66, and 93. Negative: No bands or banding 
                               patterns which do not 
                               meet positive criteria. IgM P41 Ab Absent IgM P39 Ab Absent IgM P23 Ab Absent Lyme Ab, IgM WB Interp. Negative Comment:  
   Note: An equivocal or positive EIA result followed by a negative Western Blot result is considered NEGATIVE. An equivocal or positive EIA result followed by a positive Western Blot is considered POSITIVE 
by the CDC. Positive: 2 of the following bands: 23,39 or 41 Negative: No bands or banding patterns which do not meet positive 
criteria. Criteria for positivity are those recommended by CDC/ASTPHLD. p23=Osp C, g80=oidcfzcwo Note: 
Sera from individuals with the following may cross react in the 
Lyme Western Blot assays: other spirochetal diseases (periodontal 
disease, leptospirosis, relapsing fever, yaws, and pinta); 
connective autoimmune (Rheumatoid Arthritis and Systemic Lupus Erythematosus and also individuals with Antinuclear Antibody); 
other infections COFFEE [facility] Spotted Fever; Ravi-Barr Virus, 
and Cytomegalovirus). Narrative Performed at:  [address]  [id number] : June Kirby MD, Phone:  [phone] RECOMMENDATIONS: 
Cholesterol levels have bumped up a bit, as has borderline diabetes test.  Work on limiting carbs/starches, and both will improve.  Eating oatmeal on a regular basis has also been shown to help. Prostate test is lower, and lyme studies were negative/normal.  So all in all, labs look good.  No need for any additional meds.  
    
   
   
 
 
 
Please call me if you have any questions: [phone] Sincerely, Chip Tucker III, DO

## 2018-01-22 LAB
ALBUMIN SERPL-MCNC: 4 G/DL (ref 3.6–4.8)
ALBUMIN/GLOB SERPL: 1.9 {RATIO} (ref 1.2–2.2)
ALP SERPL-CCNC: 81 IU/L (ref 39–117)
ALT SERPL-CCNC: 10 IU/L (ref 0–44)
AST SERPL-CCNC: 14 IU/L (ref 0–40)
B BURGDOR IGG PATRN SER IB-IMP: NEGATIVE
B BURGDOR IGM PATRN SER IB-IMP: NEGATIVE
B BURGDOR18KD IGG SER QL IB: ABNORMAL
B BURGDOR23KD IGG SER QL IB: ABNORMAL
B BURGDOR23KD IGM SER QL IB: ABNORMAL
B BURGDOR28KD IGG SER QL IB: ABNORMAL
B BURGDOR30KD IGG SER QL IB: ABNORMAL
B BURGDOR39KD IGG SER QL IB: ABNORMAL
B BURGDOR39KD IGM SER QL IB: ABNORMAL
B BURGDOR41KD IGG SER QL IB: PRESENT
B BURGDOR41KD IGM SER QL IB: ABNORMAL
B BURGDOR45KD IGG SER QL IB: ABNORMAL
B BURGDOR58KD IGG SER QL IB: ABNORMAL
B BURGDOR66KD IGG SER QL IB: ABNORMAL
B BURGDOR93KD IGG SER QL IB: ABNORMAL
BASOPHILS # BLD AUTO: 0 X10E3/UL (ref 0–0.2)
BASOPHILS NFR BLD AUTO: 0 %
BILIRUB SERPL-MCNC: 0.4 MG/DL (ref 0–1.2)
BUN SERPL-MCNC: 28 MG/DL (ref 8–27)
BUN/CREAT SERPL: 25 (ref 10–24)
CALCIUM SERPL-MCNC: 9.3 MG/DL (ref 8.6–10.2)
CHLORIDE SERPL-SCNC: 97 MMOL/L (ref 96–106)
CHOLEST SERPL-MCNC: 186 MG/DL (ref 100–199)
CO2 SERPL-SCNC: 30 MMOL/L (ref 18–29)
CREAT SERPL-MCNC: 1.12 MG/DL (ref 0.76–1.27)
EOSINOPHIL # BLD AUTO: 0.2 X10E3/UL (ref 0–0.4)
EOSINOPHIL NFR BLD AUTO: 4 %
ERYTHROCYTE [DISTWIDTH] IN BLOOD BY AUTOMATED COUNT: 15.2 % (ref 12.3–15.4)
EST. AVERAGE GLUCOSE BLD GHB EST-MCNC: 120 MG/DL
GLOBULIN SER CALC-MCNC: 2.1 G/DL (ref 1.5–4.5)
GLUCOSE SERPL-MCNC: 83 MG/DL (ref 65–99)
HBA1C MFR BLD: 5.8 % (ref 4.8–5.6)
HCT VFR BLD AUTO: 49.9 % (ref 37.5–51)
HDLC SERPL-MCNC: 64 MG/DL
HGB BLD-MCNC: 16.5 G/DL (ref 13–17.7)
IMM GRANULOCYTES # BLD: 0 X10E3/UL (ref 0–0.1)
IMM GRANULOCYTES NFR BLD: 0 %
LDLC SERPL CALC-MCNC: 104 MG/DL (ref 0–99)
LYMPHOCYTES # BLD AUTO: 0.8 X10E3/UL (ref 0.7–3.1)
LYMPHOCYTES NFR BLD AUTO: 12 %
MCH RBC QN AUTO: 32.4 PG (ref 26.6–33)
MCHC RBC AUTO-ENTMCNC: 33.1 G/DL (ref 31.5–35.7)
MCV RBC AUTO: 98 FL (ref 79–97)
MONOCYTES # BLD AUTO: 0.5 X10E3/UL (ref 0.1–0.9)
MONOCYTES NFR BLD AUTO: 7 %
NEUTROPHILS # BLD AUTO: 4.9 X10E3/UL (ref 1.4–7)
NEUTROPHILS NFR BLD AUTO: 77 %
PLATELET # BLD AUTO: 196 X10E3/UL (ref 150–379)
POTASSIUM SERPL-SCNC: 3.9 MMOL/L (ref 3.5–5.2)
PROT SERPL-MCNC: 6.1 G/DL (ref 6–8.5)
PSA SERPL-MCNC: 4.2 NG/ML (ref 0–4)
RBC # BLD AUTO: 5.1 X10E6/UL (ref 4.14–5.8)
SODIUM SERPL-SCNC: 145 MMOL/L (ref 134–144)
TRIGL SERPL-MCNC: 89 MG/DL (ref 0–149)
TSH SERPL DL<=0.005 MIU/L-ACNC: 2.21 UIU/ML (ref 0.45–4.5)
VLDLC SERPL CALC-MCNC: 18 MG/DL (ref 5–40)
WBC # BLD AUTO: 6.3 X10E3/UL (ref 3.4–10.8)

## 2018-01-23 NOTE — PROGRESS NOTES
I have attempted to contact this patient by phone with the following results:   Cholesterol levels have bumped up a bit, as has borderline diabetes test.  Work on limiting carbs/starches, and both will improve.  Eating oatmeal on a regular basis has also been shown to help. Prostate test is lower, and lyme studies were negative/normal.  So all in all, labs look good.  No need for any additional meds.      Results mailed to patient's home.

## 2018-01-23 NOTE — PROGRESS NOTES
Cholesterol levels have bumped up a bit, as has borderline diabetes test.  Work on limiting carbs/starches, and both will improve. Eating oatmeal on a regular basis has also been shown to help. Prostate test is lower, and lyme studies were negative/normal.  So all in all, labs look good. No need for any additional meds.

## 2018-01-24 ENCOUNTER — TELEPHONE (OUTPATIENT)
Dept: INTERNAL MEDICINE CLINIC | Age: 68
End: 2018-01-24

## 2018-01-24 NOTE — TELEPHONE ENCOUNTER
Pt stated he missed a call from [address] regarding blood work results. Pt best contact number [phone].        Message received & copied from [facility]

## 2018-01-25 NOTE — TELEPHONE ENCOUNTER
Spoke with patient after 2 patient identifiers being note and advised per Jo Bartlett Cholesterol levels have bumped up a bit, as has borderline diabetes test.  Work on limiting carbs/starches, and both will improve.  Eating oatmeal on a regular basis has also been shown to help. Prostate test is lower, and lyme studies were negative/normal.  So all in all, labs look good.  No need for any additional meds. . Patient expressed understanding and has no further questions at this time.

## 2018-04-05 ENCOUNTER — TELEPHONE (OUTPATIENT)
Dept: INTERNAL MEDICINE CLINIC | Age: 68
End: 2018-04-05

## 2018-04-05 DIAGNOSIS — J41.0 SIMPLE CHRONIC BRONCHITIS (HCC): ICD-10-CM

## 2018-04-05 RX ORDER — BUDESONIDE AND FORMOTEROL FUMARATE DIHYDRATE 160; 4.5 UG/1; UG/1
2 AEROSOL RESPIRATORY (INHALATION) 2 TIMES DAILY
Qty: 1 INHALER | Refills: 0 | Status: SHIPPED | COMMUNITY
Start: 2018-04-05 | End: 2019-06-20

## 2018-04-05 RX ORDER — BUDESONIDE AND FORMOTEROL FUMARATE DIHYDRATE 160; 4.5 UG/1; UG/1
2 AEROSOL RESPIRATORY (INHALATION) 2 TIMES DAILY
Qty: 1 INHALER | Refills: 12 | Status: SHIPPED | OUTPATIENT
Start: 2018-04-05 | End: 2019-06-20

## 2018-04-05 NOTE — TELEPHONE ENCOUNTER
Called pt's home number. Zulay Brenner (HIPAA) answered phone. Joss Wesley declines to verify pt's  and hung up phone.

## 2018-04-05 NOTE — TELEPHONE ENCOUNTER
Received call from pt. Two pt identifiers confirmed. Pt informed per Dr. Artem Gordon that sample symbicort ready for  and script for symbicort w/ savings card. Pt verbalized understanding of information discussed w/ no further questions at this time. Sample med, script, and savings card in white labeled bag in sample closet.

## 2018-04-25 ENCOUNTER — HOSPITAL ENCOUNTER (OUTPATIENT)
Dept: MRI IMAGING | Age: 68
Discharge: HOME | End: 2018-04-25
Attending: ORTHOPAEDIC SURGERY
Payer: MEDICARE

## 2018-04-25 DIAGNOSIS — M75.102 ROTATOR CUFF TEAR, LEFT: ICD-10-CM

## 2018-04-25 PROCEDURE — 73221 MRI JOINT UPR EXTREM W/O DYE: CPT

## 2018-10-15 RX ORDER — HYDROCHLOROTHIAZIDE 25 MG/1
TABLET ORAL
Qty: 30 TAB | Refills: 11 | Status: SHIPPED | OUTPATIENT
Start: 2018-10-15 | End: 2019-06-20

## 2018-10-15 NOTE — TELEPHONE ENCOUNTER
Pt requesting Hydrochlorothiazide 25 mg sent to [facility] [phone].  Best contact [phone]       Message received & copied from [facility]

## 2019-02-03 ENCOUNTER — OFFICE VISIT (OUTPATIENT)
Dept: URGENT CARE | Age: 69
End: 2019-02-03

## 2019-02-03 VITALS
HEART RATE: 77 BPM | TEMPERATURE: 98 F | OXYGEN SATURATION: 98 % | DIASTOLIC BLOOD PRESSURE: 75 MMHG | WEIGHT: 174 LBS | HEIGHT: 66 IN | BODY MASS INDEX: 27.97 KG/M2 | RESPIRATION RATE: 18 BRPM | SYSTOLIC BLOOD PRESSURE: 123 MMHG

## 2019-02-03 DIAGNOSIS — S20.212A CONTUSION OF RIB ON LEFT SIDE, INITIAL ENCOUNTER: ICD-10-CM

## 2019-02-03 DIAGNOSIS — T14.90XA INJURY: ICD-10-CM
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
## 2019-02-11 ENCOUNTER — HOSPITAL ENCOUNTER (EMERGENCY)
Age: 69
Discharge: HOME | End: 2019-02-11
Attending: EMERGENCY MEDICINE
Payer: MEDICARE

## 2019-02-11 ENCOUNTER — APPOINTMENT (OUTPATIENT)
Dept: GENERAL RADIOLOGY | Age: 69
End: 2019-02-11
Attending: EMERGENCY MEDICINE
Payer: MEDICARE

## 2019-02-11 VITALS
WEIGHT: 175.49 LBS | TEMPERATURE: 97.6 F | HEIGHT: 70 IN | OXYGEN SATURATION: 95 % | RESPIRATION RATE: 20 BRPM | BODY MASS INDEX: 25.12 KG/M2 | DIASTOLIC BLOOD PRESSURE: 82 MMHG | SYSTOLIC BLOOD PRESSURE: 129 MMHG | HEART RATE: 62 BPM

## 2019-02-11 DIAGNOSIS — S22.32XA CLOSED FRACTURE OF ONE RIB OF LEFT SIDE, INITIAL ENCOUNTER: Primary | ICD-10-CM

## 2019-02-11 PROCEDURE — 77030027138 HC INCENT SPIROMETER -A

## 2019-02-11 PROCEDURE — 99283 EMERGENCY DEPT VISIT LOW MDM: CPT

## 2019-02-11 PROCEDURE — 71101 X-RAY EXAM UNILAT RIBS/CHEST: CPT

## 2019-02-11 PROCEDURE — 74011000250 HC RX REV CODE- 250: Performed by: EMERGENCY MEDICINE

## 2019-02-11 RX ORDER — LIDOCAINE 4 G/100G
1 PATCH TOPICAL EVERY 24 HOURS
Status: DISCONTINUED | OUTPATIENT
Start: 2019-02-11 | End: 2019-02-11 | Stop reason: HOSPADM

## 2019-02-11 RX ORDER — OXYCODONE HYDROCHLORIDE 5 MG/1
5 TABLET ORAL
Status: DISCONTINUED | OUTPATIENT
Start: 2019-02-11 | End: 2019-02-11

## 2019-02-11 RX ORDER — OXYCODONE HYDROCHLORIDE 5 MG/1
5 TABLET ORAL
Qty: 10 TAB | Refills: 0 | Status: SHIPPED | OUTPATIENT
Start: 2019-02-11 | End: 2019-06-20

## 2019-02-11 NOTE — DISCHARGE INSTRUCTIONS
YOU SHOULD TAKE 10 DEEP BREATHS EVERY HOUR WHILE AWAKE    TYLENOL AND IBUPROFEN FOR MILD TO MODERATE PAIN    AND Rx PAIN MEDS FOR MODERATE TO SEVERE
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
## 2019-02-11 NOTE — ED NOTES
MD Yossi Cantrell reviewed discharge instructions with the patient. The patient verbalized understanding. Patient discharged home with stable vitals. Patient ambulatory out of ED with discharge instructions in hand. Opportunity for questions and clarification provided. No further complaints noted at this time.

## 2019-04-05 RX ORDER — BUDESONIDE AND FORMOTEROL FUMARATE DIHYDRATE 160; 4.5 UG/1; UG/1
AEROSOL RESPIRATORY (INHALATION)
Qty: 10.2 INHALER | Refills: 12 | Status: SHIPPED | OUTPATIENT
Start: 2019-04-05 | End: 2019-10-29

## 2019-04-11 ENCOUNTER — OFFICE VISIT (OUTPATIENT)
Dept: URGENT CARE | Age: 69
End: 2019-04-11

## 2019-04-11 VITALS
RESPIRATION RATE: 18 BRPM | TEMPERATURE: 98.8 F | DIASTOLIC BLOOD PRESSURE: 74 MMHG | OXYGEN SATURATION: 96 % | HEIGHT: 70 IN | BODY MASS INDEX: 24.2 KG/M2 | HEART RATE: 80 BPM | WEIGHT: 169 LBS | SYSTOLIC BLOOD PRESSURE: 111 MMHG

## 2019-04-11 DIAGNOSIS — L81.9 BLEEDING PIGMENTED SKIN LESION: ICD-10-CM

## 2019-04-11 DIAGNOSIS — L98.9 SKIN LESION OF RIGHT ARM: Primary | ICD-10-CM

## 2019-04-11 NOTE — PROGRESS NOTES
Slowly growing spot on top of right forearm for past 2 years  It bleeds and cracks intermittently and has gotten larger. [facility] nurse saw this while I was at work and said it looked like cancer\"  He is a 100 pack year smoker. Denies prior hx of cancer. No fevers or weight loss. Denies any dizziness, fever, chills or other cause of spot. Past Medical History:   Diagnosis Date    Diverticulitis     Hypertension     Ill-defined condition     kidney stones/ stant placed        Past Surgical History:   Procedure Laterality Date    COLONOSCOPY N/A 11/30/2016    COLONOSCOPY performed by Colni Blake MD at [facility] [address] [facility],FORCEP/CAUTERY  11/30/2016         HX BACK SURGERY      plate to upper neck    HX CERVICAL FUSION      HX ORTHOPAEDIC      left ring finger-trauma    HX ORTHOPAEDIC      right index finger    HX ORTHOPAEDIC  12/2/14    Bilateral total knee replacements    HX TONSILLECTOMY      HX UROLOGICAL      kidney stone extraction/stent         Family History   Problem Relation Age of Onset    No Known Problems Mother     Emphysema Father         Social History     Socioeconomic History    Marital status:      Spouse name: Not on file    Number of children: Not on file    Years of education: Not on file    Highest education level: Not on file   Occupational History    Not on file   Social Needs    Financial resource strain: Not on file    Food insecurity:     Worry: Not on file     Inability: Not on file    Transportation needs:     Medical: Not on file     Non-medical: Not on file   Tobacco Use    Smoking status: Current Every Day Smoker     Packs/day: 2.00     Years: 50.00     Pack years: 100.00     Types: Cigarettes    Smokeless tobacco: Never Used    Tobacco comment: electronic cigarettes in the past   Substance and Sexual Activity    Alcohol use:  Yes     Alcohol/week: 1.8 oz     Types: 3 Cans of beer per week     Comment: social -3 drinks weekly--rum    Drug use: No    Sexual activity: Yes     Partners: Female   Lifestyle    Physical activity:     Days per week: Not on file     Minutes per session: Not on file    Stress: Not on file   Relationships    Social connections:     Talks on phone: Not on file     Gets together: Not on file     Attends Buddhist service: Not on file     Active member of club or organization: Not on file     Attends meetings of clubs or organizations: Not on file     Relationship status: Not on file    Intimate partner violence:     Fear of current or ex partner: Not on file     Emotionally abused: Not on file     Physically abused: Not on file     Forced sexual activity: Not on file   Other Topics Concern    Not on file   Social History Narrative    Not on file                ALLERGIES: Patient has no known allergies. Review of Systems   All other systems reviewed and are negative. Vitals:    04/11/19 1614   BP: 111/74   Pulse: 80   Resp: 18   Temp: 98.8 °F (37.1 °C)   SpO2: 96%   Weight: 169 lb (76.7 kg)   Height: 5' 10\" (1.778 m)       Physical Exam   Constitutional: He is oriented to person, place, and time. Cardiovascular: Normal rate, regular rhythm and normal heart sounds. Pulmonary/Chest: Effort normal and breath sounds normal.   Neurological: He is alert and oriented to person, place, and time. Skin:        Psychiatric: He has a normal mood and affect. His behavior is normal.       MDM     Differential Diagnosis; Clinical Impression; Plan:       CLINICAL IMPRESSION:  (L98.9) Skin lesion of right arm  (primary encounter diagnosis)  (L81.9) Bleeding pigmented skin lesion    Plan: This spot is very concerning for malignancy; smoker slow growing bleeding pigmented lesion present for 2 years.   Please call your Primary Care Doctor TOMORROW and schedule to have biopsy with them as soon as possible within next week to properly diagnose              Procedures

## 2019-04-11 NOTE — PATIENT INSTRUCTIONS
This spot is very concerning for possible skin cancer. Please call your Primary Care Doctor TOMORROW and schedule to have biopsy with them as soon as possible within next week to properly diagnose.

## 2019-04-12 DIAGNOSIS — L98.9 SKIN LESION: Primary | ICD-10-CM

## 2019-06-20 ENCOUNTER — OFFICE VISIT (OUTPATIENT)
Dept: URGENT CARE | Age: 69
End: 2019-06-20

## 2019-06-20 VITALS
HEIGHT: 70 IN | TEMPERATURE: 98.4 F | SYSTOLIC BLOOD PRESSURE: 106 MMHG | DIASTOLIC BLOOD PRESSURE: 70 MMHG | RESPIRATION RATE: 18 BRPM | OXYGEN SATURATION: 94 % | WEIGHT: 168 LBS | BODY MASS INDEX: 24.05 KG/M2 | HEART RATE: 84 BPM

## 2019-06-20 DIAGNOSIS — S41.112A LACERATION OF LEFT UPPER EXTREMITY, INITIAL ENCOUNTER: Primary | ICD-10-CM

## 2019-06-20 RX ORDER — CEPHALEXIN 500 MG/1
500 CAPSULE ORAL 2 TIMES DAILY
Qty: 14 CAP | Refills: 0 | Status: SHIPPED | OUTPATIENT
Start: 2019-06-20 | End: 2019-06-27

## 2019-06-20 NOTE — PATIENT INSTRUCTIONS
Return to clinic or PCP in 10 days for suture removal, sooner if signs of infection Cuts Closed With Stitches: Care Instructions Your Care Instructions A cut can happen anywhere on your body. The doctor used stitches to close the cut. Using stitches also helps the cut heal and reduces scarring. Sometimes pieces of tape called Steri-Strips are put over the stitches. If the cut went deep and through the skin, the doctor may have put in two layers of stitches. The deeper layer brings the deep part of the cut together. These stitches will dissolve and don't need to be removed. The stitches in the upper layer are the ones you see on the cut. You will probably have a bandage over the stitches. You will need to have the stitches removed, usually in 7 to 14 days. The doctor has checked you carefully, but problems can develop later. If you notice any problems or new symptoms, get medical treatment right away. Follow-up care is a key part of your treatment and safety. Be sure to make and go to all appointments, and call your doctor if you are having problems. It's also a good idea to know your test results and keep a list of the medicines you take. How can you care for yourself at home? · Keep the cut dry for the first 24 to 48 hours. After this, you can shower if your doctor okays it. Pat the cut dry. · Don't soak the cut, such as in a bathtub. Your doctor will tell you when it's safe to get the cut wet. · If your doctor told you how to care for your cut, follow your doctor's instructions. If you did not get instructions, follow this general advice: ? After the first 24 to 48 hours, wash around the cut with clean water 2 times a day. Don't use hydrogen peroxide or alcohol, which can slow healing. ? You may cover the cut with a thin layer of petroleum jelly, such as Vaseline, and a nonstick bandage. ? Apply more petroleum jelly and replace the bandage as needed. · Prop up the sore area on a pillow anytime you sit or lie down during the next 3 days. Try to keep it above the level of your heart. This will help reduce swelling. · Avoid any activity that could cause your cut to reopen. · Do not remove the stitches on your own. Your doctor will tell you when to come back to have the stitches removed. · Leave Steri-Strips on until they fall off. · Be safe with medicines. Read and follow all instructions on the label. ? If the doctor gave you a prescription medicine for pain, take it as prescribed. ? If you are not taking a prescription pain medicine, ask your doctor if you can take an over-the-counter medicine. When should you call for help? Call your doctor now or seek immediate medical care if: 
  · You have new pain, or your pain gets worse.  
  · The skin near the cut is cold or pale or changes color.  
  · You have tingling, weakness, or numbness near the cut.  
  · The cut starts to bleed, and blood soaks through the bandage. Oozing small amounts of blood is normal.  
  · You have trouble moving the area near the cut.  
  · You have symptoms of infection, such as: 
? Increased pain, swelling, warmth, or redness around the cut. 
? Red streaks leading from the cut. 
? Pus draining from the cut. 
? A fever.  
 Watch closely for changes in your health, and be sure to contact your doctor if: 
  · The cut reopens.  
  · You do not get better as expected. Where can you learn more? Go to http://caprice-erich.info/. Enter R217 in the search box to learn more about \"Cuts Closed With Stitches: Care Instructions. \" Current as of: September 23, 2018 Content Version: 11.9 © 5075-9556 [employer]. Care instructions adapted under license by [employer] (which disclaims liability or warranty for this information).  If you have questions about a medical condition or this instruction, always ask your healthcare professional. [employer] disclaims any warranty or liability for your use of this information.

## 2019-06-20 NOTE — PROGRESS NOTES
77 y.o. male sustained laceration of left forearm PTA. Was cutting steak with knife and accidentally cut self. Last tetanus unknown. hours ago. Denies numbness, tingling, weakness. The history is provided by the patient. Past Medical History:  
Diagnosis Date  Diverticulitis  Hypertension  Ill-defined condition   
 kidney stones/ stant placed Past Surgical History:  
Procedure Laterality Date  COLONOSCOPY N/A 11/30/2016 COLONOSCOPY performed by Dorcas uGardado MD at [facility] ENDOSCOPY  COLONOSCOPY,KADI DUARTE/CAUTERY  11/30/2016  HX BACK SURGERY    
 plate to upper neck  HX CERVICAL FUSION    
 HX ORTHOPAEDIC    
 left ring finger-trauma  HX ORTHOPAEDIC    
 right index finger  HX ORTHOPAEDIC  12/2/14 Bilateral total knee replacements  HX TONSILLECTOMY  HX UROLOGICAL    
 kidney stone extraction/stent Family History Problem Relation Age of Onset  No Known Problems Mother  Emphysema Father Social History Socioeconomic History  Marital status:  Spouse name: Not on file  Number of children: Not on file  Years of education: Not on file  Highest education level: Not on file Occupational History  Not on file Social Needs  Financial resource strain: Not on file  Food insecurity:  
  Worry: Not on file Inability: Not on file  Transportation needs:  
  Medical: Not on file Non-medical: Not on file Tobacco Use  Smoking status: Current Every Day Smoker Packs/day: 2.00 Years: 50.00 Pack years: 100.00 Types: Cigarettes  Smokeless tobacco: Never Used  Tobacco comment: electronic cigarettes in the past  
Substance and Sexual Activity  Alcohol use: Yes Alcohol/week: 1.8 oz Types: 3 Cans of beer per week Comment: social -3 drinks weekly--rum  Drug use: No  
 Sexual activity: Yes  
  Partners: Female Lifestyle  Physical activity: Days per week: Not on file Minutes per session: Not on file  Stress: Not on file Relationships  Social connections:  
  Talks on phone: Not on file Gets together: Not on file Attends [employer] service: Not on file Active member of club or organization: Not on file Attends meetings of clubs or organizations: Not on file Relationship status: Not on file  Intimate partner violence:  
  Fear of current or ex partner: Not on file Emotionally abused: Not on file Physically abused: Not on file Forced sexual activity: Not on file Other Topics Concern  Not on file Social History Narrative  Not on file ALLERGIES: Patient has no known allergies. Review of Systems Constitutional: Negative for chills and fever. Respiratory: Negative for shortness of breath and wheezing. Cardiovascular: Negative for chest pain and palpitations. Musculoskeletal: Negative for myalgias. Skin: Positive for wound. Neurological: Negative for weakness and numbness. Hematological: Negative for adenopathy. Vitals:  
 06/20/19 1855 BP: 106/70 Pulse: 84 Resp: 18 Temp: 98.4 °F (36.9 °C) SpO2: 94% Weight: 168 lb (76.2 kg) Height: 5' 10\" (1.778 m) Physical Exam  
Constitutional: He appears well-developed and well-nourished. No distress. Neurological: He is alert. Skin: Laceration (Left forearm: 3cm linear laceration, 3mm deep) noted. He is not diaphoretic. Psychiatric: He has a normal mood and affect. His behavior is normal. Judgment and thought content normal.  
Nursing note and vitals reviewed. [facility] 
  ICD-10-CM ICD-9-CM 1. Laceration of left upper extremity, initial encounter S41.112A 884.0 TETANUS, DIPHTHERIA TOXOIDS AND ACELLULAR PERTUSSIS VACCINE (TDAP), IN INDIVIDS. >=7, IM Medications Ordered Today Medications  cephALEXin (KEFLEX) 500 mg capsule Sig: Take 1 Cap by mouth two (2) times a day for 7 days. Dispense:  14 Cap Refill:  0 The patient is to follow up with PCP/RTC in 10 days for suture removal, sooner if signs of infection. If signs and symptoms become worse the pt is to go to the ER. The patient is to take medications as prescribed. Wound Repair 
Date/Time: 6/20/2019 7:30 PM 
Performed by: attendingPreparation: skin prepped with Shur-Clens Pre-procedure re-eval: Immediately prior to the procedure, the patient was reevaluated and found suitable for the planned procedure and any planned medications. Location details: left arm Wound length:2.6 - 7.5 cm Anesthesia: local infiltration Anesthesia: 
Local Anesthetic: lidocaine 1% without epinephrine Anesthetic total: 10 mL Foreign bodies: no foreign bodies Irrigation solution: saline Irrigation method: syringe Skin closure: 5-0 nylon Number of sutures: 7 Technique: simple and interrupted Approximation: close Dressing: antibiotic ointment Patient tolerance: Patient tolerated the procedure well with no immediate complications My total time at bedside, performing this procedure was 1-15 minutes.

## 2019-07-01 ENCOUNTER — OFFICE VISIT (OUTPATIENT)
Dept: URGENT CARE | Age: 69
End: 2019-07-01

## 2019-07-01 VITALS
WEIGHT: 168 LBS | HEIGHT: 70 IN | DIASTOLIC BLOOD PRESSURE: 71 MMHG | OXYGEN SATURATION: 97 % | SYSTOLIC BLOOD PRESSURE: 124 MMHG | HEART RATE: 72 BPM | TEMPERATURE: 97.5 F | BODY MASS INDEX: 24.05 KG/M2 | RESPIRATION RATE: 18 BRPM

## 2019-07-01 DIAGNOSIS — Z48.02 VISIT FOR SUTURE REMOVAL: Primary | ICD-10-CM

## 2019-07-01 NOTE — PATIENT INSTRUCTIONS

## 2019-07-01 NOTE — PROGRESS NOTES
Suture Removal  
This is a new problem. The current episode started more than 1 week ago (h/o laceration on left mid forearm ). The problem has been resolved. Past Medical History:  
Diagnosis Date  Diverticulitis  Hypertension  Ill-defined condition   
 kidney stones/ stant placed Past Surgical History:  
Procedure Laterality Date  COLONOSCOPY N/A 11/30/2016 COLONOSCOPY performed by Alvin Lindsey MD at [facility] ENDOSCOPY  COLONOSCOPY,ANSELMO CASTRO,FORCEP/CAUTERY  11/30/2016  HX BACK SURGERY    
 plate to upper neck  HX CERVICAL FUSION    
 HX ORTHOPAEDIC    
 left ring finger-trauma  HX ORTHOPAEDIC    
 right index finger  HX ORTHOPAEDIC  12/2/14 Bilateral total knee replacements  HX TONSILLECTOMY  HX UROLOGICAL    
 kidney stone extraction/stent Family History Problem Relation Age of Onset  No Known Problems Mother  Emphysema Father Social History Socioeconomic History  Marital status:  Spouse name: Not on file  Number of children: Not on file  Years of education: Not on file  Highest education level: Not on file Occupational History  Not on file Social Needs  Financial resource strain: Not on file  Food insecurity:  
  Worry: Not on file Inability: Not on file  Transportation needs:  
  Medical: Not on file Non-medical: Not on file Tobacco Use  Smoking status: Current Every Day Smoker Packs/day: 2.00 Years: 50.00 Pack years: 100.00 Types: Cigarettes  Smokeless tobacco: Never Used  Tobacco comment: electronic cigarettes in the past  
Substance and Sexual Activity  Alcohol use: Yes Alcohol/week: 1.8 oz Types: 3 Cans of beer per week Comment: social -3 drinks weekly--rum  Drug use: No  
 Sexual activity: Yes  
  Partners: Female Lifestyle  Physical activity:  
  Days per week: Not on file Minutes per session: Not on file  Stress: Not on file Relationships  Social connections:  
  Talks on phone: Not on file Gets together: Not on file Attends Alevism service: Not on file Active member of club or organization: Not on file Attends meetings of clubs or organizations: Not on file Relationship status: Not on file  Intimate partner violence:  
  Fear of current or ex partner: Not on file Emotionally abused: Not on file Physically abused: Not on file Forced sexual activity: Not on file Other Topics Concern  Not on file Social History Narrative  Not on file ALLERGIES: Patient has no known allergies. Review of Systems Vitals:  
 07/01/19 5265 BP: 124/71 Pulse: 72 Resp: 18 Temp: 97.5 °F (36.4 °C) SpO2: 97% Weight: 168 lb (76.2 kg) Height: 5' 10\" (1.778 m) Physical Exam  
Musculoskeletal:  
     Left forearm: He exhibits laceration (healed). He exhibits no tenderness. Arms: 
Nursing note and vitals reviewed. MDM Suture/Staple Removal 
Performed by: Nita Cortez MD 
Authorized by: Nita Cortez MD  
Patient tolerance: Patient tolerated the procedure well with no immediate complications Body area: upper extremity Location details: left lower arm Wound Appearance: clean Sutures Removed: 6 Post-removal: Steri-Strips applied Facility: sutures placed in this facility My total time at bedside, performing this procedure was 1-15 minutes. ICD-10-CM ICD-9-CM 1. Visit for suture removal Z48.02 V58.32 Steri stripped applied No orders of the defined types were placed in this encounter. No results found for any visits on 07/01/19. The patients condition was discussed with the patient and they understand. The patient is to follow up with primary care doctor. If signs and symptoms become worse the pt is to go to the ER. The patient is to take medications as prescribed.

## 2019-07-15 ENCOUNTER — OFFICE VISIT (OUTPATIENT)
Dept: URGENT CARE | Age: 69
End: 2019-07-15

## 2019-07-15 VITALS
BODY MASS INDEX: 24.05 KG/M2 | RESPIRATION RATE: 18 BRPM | OXYGEN SATURATION: 90 % | SYSTOLIC BLOOD PRESSURE: 104 MMHG | DIASTOLIC BLOOD PRESSURE: 60 MMHG | TEMPERATURE: 98.9 F | HEIGHT: 70 IN | HEART RATE: 73 BPM | WEIGHT: 168 LBS

## 2019-07-15 DIAGNOSIS — J84.9 ACUTE INTERSTITIAL PNEUMONIA (HCC): Primary | ICD-10-CM

## 2019-07-15 RX ORDER — AZITHROMYCIN 250 MG/1
TABLET, FILM COATED ORAL
Qty: 6 TAB | Refills: 0 | Status: SHIPPED | OUTPATIENT
Start: 2019-07-15 | End: 2019-10-14

## 2019-07-15 RX ORDER — IPRATROPIUM BROMIDE AND ALBUTEROL SULFATE 2.5; .5 MG/3ML; MG/3ML
3 SOLUTION RESPIRATORY (INHALATION)
Status: COMPLETED | OUTPATIENT
Start: 2019-07-15 | End: 2019-07-15

## 2019-07-15 RX ORDER — PREDNISONE 10 MG/1
TABLET ORAL
Qty: 21 TAB | Refills: 0 | Status: SHIPPED | OUTPATIENT
Start: 2019-07-15 | End: 2019-10-14 | Stop reason: SDUPTHER

## 2019-07-15 RX ORDER — CODEINE PHOSPHATE AND GUAIFENESIN 10; 100 MG/5ML; MG/5ML
5 SOLUTION ORAL
Qty: 50 ML | Refills: 0 | Status: SHIPPED | OUTPATIENT
Start: 2019-07-15 | End: 2019-07-25

## 2019-07-15 RX ORDER — ALBUTEROL SULFATE 90 UG/1
2 AEROSOL, METERED RESPIRATORY (INHALATION)
Qty: 1 INHALER | Refills: 0 | Status: SHIPPED | OUTPATIENT
Start: 2019-07-15 | End: 2019-10-14 | Stop reason: SDUPTHER

## 2019-07-15 RX ORDER — DOXYCYCLINE 100 MG/1
100 CAPSULE ORAL 2 TIMES DAILY
Qty: 20 CAP | Refills: 0 | Status: SHIPPED | OUTPATIENT
Start: 2019-07-15 | End: 2019-10-14 | Stop reason: SDUPTHER

## 2019-07-15 RX ORDER — HYDROCHLOROTHIAZIDE 25 MG/1
TABLET ORAL
Refills: 0 | COMMUNITY
Start: 2019-06-26 | End: 2020-01-13

## 2019-07-15 RX ORDER — BENZONATATE 200 MG/1
200 CAPSULE ORAL
Qty: 21 CAP | Refills: 0 | Status: SHIPPED | OUTPATIENT
Start: 2019-07-15 | End: 2019-07-22

## 2019-07-15 RX ADMIN — IPRATROPIUM BROMIDE AND ALBUTEROL SULFATE 3 ML: 2.5; .5 SOLUTION RESPIRATORY (INHALATION) at 13:08

## 2019-07-15 NOTE — PROGRESS NOTES
Cough   The history is provided by the patient. This is a new problem. The current episode started more than 1 week ago. The problem occurs constantly. The problem has been gradually worsening. The cough is productive of sputum. There has been no fever. Associated symptoms include chills, shortness of breath and wheezing. Pertinent negatives include no chest pain. He has tried nothing for the symptoms. He is a smoker. His past medical history is significant for bronchitis and COPD. His past medical history does not include pneumonia.         Past Medical History:   Diagnosis Date    Diverticulitis     Hypertension     Ill-defined condition     kidney stones/ stant placed        Past Surgical History:   Procedure Laterality Date    COLONOSCOPY N/A 11/30/2016    COLONOSCOPY performed by Dio Smith MD at [facility] [address] [facility],FORCEP/CAUTERY  11/30/2016         HX BACK SURGERY      plate to upper neck    HX CERVICAL FUSION      HX ORTHOPAEDIC      left ring finger-trauma    HX ORTHOPAEDIC      right index finger    HX ORTHOPAEDIC  12/2/14    Bilateral total knee replacements    HX TONSILLECTOMY      HX UROLOGICAL      kidney stone extraction/stent         Family History   Problem Relation Age of Onset    No Known Problems Mother     Emphysema Father         Social History     Socioeconomic History    Marital status:      Spouse name: Not on file    Number of children: Not on file    Years of education: Not on file    Highest education level: Not on file   Occupational History    Not on file   Social Needs    Financial resource strain: Not on file    Food insecurity:     Worry: Not on file     Inability: Not on file    Transportation needs:     Medical: Not on file     Non-medical: Not on file   Tobacco Use    Smoking status: Current Every Day Smoker     Packs/day: 2.00     Years: 50.00     Pack years: 100.00     Types: Cigarettes    Smokeless tobacco: Never Used    Tobacco comment: electronic cigarettes in the past   Substance and Sexual Activity    Alcohol use: Yes     Alcohol/week: 1.8 oz     Types: 3 Cans of beer per week     Comment: social -3 drinks weekly--rum    Drug use: No    Sexual activity: Yes     Partners: Female   Lifestyle    Physical activity:     Days per week: Not on file     Minutes per session: Not on file    Stress: Not on file   Relationships    Social connections:     Talks on phone: Not on file     Gets together: Not on file     Attends Mu-ism service: Not on file     Active member of club or organization: Not on file     Attends meetings of clubs or organizations: Not on file     Relationship status: Not on file    Intimate partner violence:     Fear of current or ex partner: Not on file     Emotionally abused: Not on file     Physically abused: Not on file     Forced sexual activity: Not on file   Other Topics Concern    Not on file   Social History Narrative    Not on file                ALLERGIES: Patient has no known allergies. Review of Systems   Constitutional: Positive for chills. Respiratory: Positive for cough, shortness of breath and wheezing. Cardiovascular: Negative for chest pain. All other systems reviewed and are negative. Vitals:    07/15/19 1243   BP: 104/60   Pulse: 73   Resp: 18   Temp: 98.9 °F (37.2 °C)   SpO2: 90%   Weight: 168 lb (76.2 kg)   Height: 5' 10\" (1.778 m)       Physical Exam   Constitutional: No distress. HENT:   Right Ear: Tympanic membrane and ear canal normal.   Left Ear: Tympanic membrane and ear canal normal.   Nose: Nose normal.   Mouth/Throat: No oropharyngeal exudate, posterior oropharyngeal edema or posterior oropharyngeal erythema. Eyes: Conjunctivae are normal. Right eye exhibits no discharge. Left eye exhibits no discharge. Neck: Neck supple. Pulmonary/Chest: Effort normal. No respiratory distress. He has decreased breath sounds. He has no wheezes. He has no rhonchi.  He has no rales. Lymphadenopathy:     He has no cervical adenopathy. Skin: No rash noted. Nursing note and vitals reviewed. MDM    Procedures      ICD-10-CM ICD-9-CM    1. Acute interstitial pneumonia (HCC) J84.9 516.8 XR CHEST PA LAT      guaiFENesin-codeine (ROBITUSSIN AC) 100-10 mg/5 mL solution    Rt perihilar      Medications Ordered Today   Medications    albuterol-ipratropium (DUO-NEB) 2.5 MG-0.5 MG/3 ML     Order Specific Question:   MODE OF DELIVERY     Answer:   Nebulizer    doxycycline (MONODOX) 100 mg capsule     Sig: Take 1 Cap by mouth two (2) times a day. Dispense:  20 Cap     Refill:  0    azithromycin (ZITHROMAX) 250 mg tablet     Sig: Take two tablets today then one tablet daily     Dispense:  6 Tab     Refill:  0    predniSONE (STERAPRED DS) 10 mg dose pack     Sig: As directed     Dispense:  21 Tab     Refill:  0    benzonatate (TESSALON) 200 mg capsule     Sig: Take 1 Cap by mouth three (3) times daily as needed for Cough for up to 7 days. Dispense:  21 Cap     Refill:  0    guaiFENesin-codeine (ROBITUSSIN AC) 100-10 mg/5 mL solution     Sig: Take 5 mL by mouth nightly as needed for Cough for up to 10 days. Max Daily Amount: 5 mL. Dispense:  50 mL     Refill:  0    albuterol (PROVENTIL HFA, VENTOLIN HFA, PROAIR HFA) 90 mcg/actuation inhaler     Sig: Take 2 Puffs by inhalation every six (6) hours as needed for Wheezing. Dispense:  1 Inhaler     Refill:  0     Results for orders placed or performed in visit on 07/15/19   XR CHEST PA LAT    Narrative    Exam:  2 view chest    Indication: Cough, congestion    Comparison to 2/11/2019. PA and lateral views demonstrate normal heart size. Right perihilar interstitial  prominence is noted, new compared to the prior exam. The osseous structures are  unremarkable. Impression    Impression: Right perihilar interstitial prominence may be related to acute  interstitial infiltrate or edema.        The patients condition was discussed with the patient and they understand. The patient is to follow up with primary care doctor. If signs and symptoms become worse the pt is to go to the ER. The patient is to take medications as prescribed.

## 2019-07-15 NOTE — PATIENT INSTRUCTIONS
Interstitial Lung Disease: Care Instructions  Your Care Instructions    Interstitial lung disease is a long-term (chronic) lung disease. It happens because of damage between the air sacs in the lung. The damage scars the lung and causes breathing problems. People with interstitial lung disease get breathless during exercise and may have a dry cough. These problems may get worse slowly or very quickly. Interstitial lung disease can be caused by breathing in dust from asbestos and silica. It also can be caused by infections and some medicines. Sometimes doctors cannot find the cause. You may get medicine to treat the problem. Corticosteroids can sometimes reduce the swelling of lung tissue and prevent more damage. Oxygen treatment may help your condition. Follow-up care is a key part of your treatment and safety. Be sure to make and go to all appointments, and call your doctor if you are having problems. It's also a good idea to know your test results and keep a list of the medicines you take. How can you care for yourself at home? · Do not smoke. Smoking makes interstitial lung disease worse. If you need help quitting, talk to your doctor about stop-smoking programs and medicines. These can increase your chances of quitting for good. · Take your medicines exactly as prescribed. Call your doctor if you have any problems with your medicine. · Get flu and pneumococcal shots. These help prevent lung infection. · Make an exercise plan with help from your doctor or other health professional. Exercise can help you breathe more easily. · Think about joining a support group. This can help you cope with problems caused by interstitial lung disease. When should you call for help?   Call your doctor now or seek immediate medical care if:    · Your shortness of breath gets worse.     · You cough up blood.     · You have severe chest pain.    Watch closely for changes in your health, and be sure to contact your doctor if you have any problems. Where can you learn more? Go to http://caprice-erich.info/. Enter L925 in the search box to learn more about \"Interstitial Lung Disease: Care Instructions. \"  Current as of: September 5, 2018  Content Version: 11.9  © 4188-9915 [employer]. Care instructions adapted under license by [employer] (which disclaims liability or warranty for this information). If you have questions about a medical condition or this instruction, always ask your healthcare professional. [address] any warranty or liability for your use of this information. Pneumonia: Care Instructions  Your Care Instructions    Pneumonia is an infection of the lungs. Most cases are caused by infections from bacteria or viruses. Pneumonia may be mild or very severe. If it is caused by bacteria, you will be treated with antibiotics. It may take a few weeks to a few months to recover fully from pneumonia, depending on how sick you were and whether your overall health is good. Follow-up care is a key part of your treatment and safety. Be sure to make and go to all appointments, and call your doctor if you are having problems. It's also a good idea to know your test results and keep a list of the medicines you take. How can you care for yourself at home? · Take your antibiotics exactly as directed. Do not stop taking the medicine just because you are feeling better. You need to take the full course of antibiotics. · Take your medicines exactly as prescribed. Call your doctor if you think you are having a problem with your medicine. · Get plenty of rest and sleep. You may feel weak and tired for a while, but your energy level will improve with time. · To prevent dehydration, drink plenty of fluids, enough so that your urine is light yellow or clear like water. Choose water and other caffeine-free clear liquids until you feel better.  If you have kidney, heart, or liver disease and have to limit fluids, talk with your doctor before you increase the amount of fluids you drink. · Take care of your cough so you can rest. A cough that brings up mucus from your lungs is common with pneumonia. It is one way your body gets rid of the infection. But if coughing keeps you from resting or causes severe fatigue and chest-wall pain, talk to your doctor. He or she may suggest that you take a medicine to reduce the cough. · Use a vaporizer or humidifier to add moisture to your bedroom. Follow the directions for cleaning the machine. · Do not smoke or allow others to smoke around you. Smoke will make your cough last longer. If you need help quitting, talk to your doctor about stop-smoking programs and medicines. These can increase your chances of quitting for good. · Take an over-the-counter pain medicine, such as acetaminophen (Tylenol), ibuprofen (Advil, Motrin), or naproxen (Aleve). Read and follow all instructions on the label. · Do not take two or more pain medicines at the same time unless the doctor told you to. Many pain medicines have acetaminophen, which is Tylenol. Too much acetaminophen (Tylenol) can be harmful. · If you were given a spirometer to measure how well your lungs are working, use it as instructed. This can help your doctor tell how your recovery is going. · To prevent pneumonia in the future, talk to your doctor about getting a flu vaccine (once a year) and a pneumococcal vaccine (one time only for most people). When should you call for help? Call 911 anytime you think you may need emergency care.  For example, call if:    · You have severe trouble breathing.    Call your doctor now or seek immediate medical care if:    · You cough up dark brown or bloody mucus (sputum).     · You have new or worse trouble breathing.     · You are dizzy or lightheaded, or you feel like you may faint.    Watch closely for changes in your health, and be sure to contact your doctor if:    · You have a new or higher fever.     · You are coughing more deeply or more often.     · You are not getting better after 2 days (48 hours).     · You do not get better as expected. Where can you learn more? Go to http://caprice-erich.info/. Enter [id number] in the search box to learn more about \"Pneumonia: Care Instructions. \"  Current as of: September 5, 2018  Content Version: 11.9  © 7666-6413 [employer]. Care instructions adapted under license by [employer] (which disclaims liability or warranty for this information). If you have questions about a medical condition or this instruction, always ask your healthcare professional. [address] any warranty or liability for your use of this information.

## 2019-10-14 ENCOUNTER — TELEPHONE (OUTPATIENT)
Dept: INTERNAL MEDICINE CLINIC | Age: 69
End: 2019-10-14

## 2019-10-14 ENCOUNTER — OFFICE VISIT (OUTPATIENT)
Dept: URGENT CARE | Age: 69
End: 2019-10-14

## 2019-10-14 VITALS
HEART RATE: 70 BPM | BODY MASS INDEX: 23.91 KG/M2 | HEIGHT: 70 IN | RESPIRATION RATE: 20 BRPM | OXYGEN SATURATION: 93 % | DIASTOLIC BLOOD PRESSURE: 70 MMHG | TEMPERATURE: 98.7 F | SYSTOLIC BLOOD PRESSURE: 126 MMHG | WEIGHT: 167 LBS

## 2019-10-14 DIAGNOSIS — J44.1 CHRONIC OBSTRUCTIVE PULMONARY DISEASE WITH ACUTE EXACERBATION (HCC): ICD-10-CM

## 2019-10-14 DIAGNOSIS — R06.02 SHORTNESS OF BREATH: Primary | ICD-10-CM

## 2019-10-14 RX ORDER — PREDNISONE 20 MG/1
TABLET ORAL
Qty: 21 TAB | Refills: 0 | Status: SHIPPED | OUTPATIENT
Start: 2019-10-14 | End: 2019-10-29 | Stop reason: ALTCHOICE

## 2019-10-14 RX ORDER — DOXYCYCLINE 100 MG/1
100 CAPSULE ORAL 2 TIMES DAILY
Qty: 20 CAP | Refills: 0 | Status: SHIPPED | OUTPATIENT
Start: 2019-10-14 | End: 2019-10-29 | Stop reason: ALTCHOICE

## 2019-10-14 RX ORDER — AZITHROMYCIN 250 MG/1
TABLET, FILM COATED ORAL
Qty: 6 TAB | Refills: 0 | Status: SHIPPED | OUTPATIENT
Start: 2019-10-14 | End: 2019-10-29 | Stop reason: ALTCHOICE

## 2019-10-14 RX ORDER — CODEINE PHOSPHATE AND GUAIFENESIN 10; 100 MG/5ML; MG/5ML
5 SOLUTION ORAL
Qty: 120 ML | Refills: 0 | Status: SHIPPED | OUTPATIENT
Start: 2019-10-14 | End: 2019-10-21

## 2019-10-14 RX ORDER — ALBUTEROL SULFATE 90 UG/1
2 AEROSOL, METERED RESPIRATORY (INHALATION)
Qty: 1 INHALER | Refills: 0 | Status: SHIPPED | OUTPATIENT
Start: 2019-10-14 | End: 2019-10-29 | Stop reason: SDUPTHER

## 2019-10-14 NOTE — PATIENT INSTRUCTIONS
Chronic Obstructive Pulmonary Disease (COPD) Flare-Ups: Care Instructions  Your Care Instructions    Chronic obstructive pulmonary disease (COPD) is a lung disease that makes it hard to breathe. It is caused by damage to the lungs over many years, usually from smoking. COPD is often a mix of two diseases:  · Chronic bronchitis: The airways that carry air to the lungs (bronchial tubes) get inflamed and make a lot of mucus. This can narrow or block the airways. · Emphysema: In a healthy person, the tiny air sacs in the lungs are like balloons. As you breathe in and out, they get bigger and smaller to move air through your lungs. But with emphysema, these air sacs are damaged and lose their stretch. Less air gets in and out of the lungs. Many people with COPD have attacks called flare-ups or exacerbations. This is when your usual symptoms quickly get worse and stay worse. The doctor has checked you carefully. But problems can develop later. If you notice any problems or new symptoms, get medical treatment right away. Follow-up care is a key part of your treatment and safety. Be sure to make and go to all appointments, and call your doctor if you are having problems. It's also a good idea to know your test results and keep a list of the medicines you take. How can you care for yourself at home? · Be safe with medicines. Take your medicines exactly as prescribed. Call your doctor if you think you are having a problem with your medicine. You may be taking medicines such as:  ? Bronchodilators. These help open your airways and make breathing easier. ? Corticosteroids. These reduce airway inflammation. They may be given as pills, in a vein, or in an inhaled form. You may go home with pills in addition to an inhaler that you already use. · A spacer may help you get more inhaled medicine to your lungs. Ask your doctor or pharmacist if a spacer is right for you. If it is, ask how to use it properly.   · If your doctor prescribed antibiotics, take them as directed. Do not stop taking them just because you feel better. You need to take the full course of antibiotics. · If your doctor prescribed oxygen, use the flow rate your doctor has recommended. Do not change it without talking to your doctor first.  · Do not smoke. Smoking makes COPD worse. If you need help quitting, talk to your doctor about stop-smoking programs and medicines. These can increase your chances of quitting for good. When should you call for help? Call 911 anytime you think you may need emergency care. For example, call if:    · You have severe trouble breathing.    Call your doctor now or seek immediate medical care if:    · You have new or worse trouble breathing.     · Your coughing or wheezing gets worse.     · You cough up dark brown or bloody mucus (sputum).     · You have a new or higher fever.    Watch closely for changes in your health, and be sure to contact your doctor if:    · You notice more mucus or a change in the color of your mucus.     · You need to use your antibiotic or steroid pills.     · You do not get better as expected. Where can you learn more? Go to http://caprice-erich.info/. Enter E618 in the search box to learn more about \"Chronic Obstructive Pulmonary Disease (COPD) Flare-Ups: Care Instructions. \"  Current as of: June 9, 2019  Content Version: 12.2  © 5128-4115 [email or web address]. Care instructions adapted under license by [employer] (which disclaims liability or warranty for this information). If you have questions about a medical condition or this instruction, always ask your healthcare professional. [place] [address] any warranty or liability for your use of this information.

## 2019-10-14 NOTE — TELEPHONE ENCOUNTER
Called, spoke to pt. Two identifiers confirmed. Appointment scheduled for 10/28 @ 330 with Dr. Radha Salas.   Pt notified to go to [facility] if s/s worsen. Pt verbalized understanding of information discussed w/ no further questions at this time.

## 2019-10-14 NOTE — TELEPHONE ENCOUNTER
#[phone] you may leave a message with Viviane Manners if pt doesn't answer. Pt has SOB, green mucus and gets exhausted walking 30 steps. Please call as soon as possible as pt is not feeling well.

## 2019-10-14 NOTE — PROGRESS NOTES
Cough   The history is provided by the patient. This is a chronic problem. The current episode started more than 1 week ago. The problem occurs every few minutes. The problem has not changed since onset. The cough is non-productive. There has been no fever. Associated symptoms include shortness of breath and wheezing. Pertinent negatives include no chest pain, no chills, no sweats and no weight loss. He has tried inhalers (symbacort ) for the symptoms. The treatment provided mild relief. He is a smoker. His past medical history is significant for COPD.         Past Medical History:   Diagnosis Date    Diverticulitis     Hypertension     Ill-defined condition     kidney stones/ stant placed        Past Surgical History:   Procedure Laterality Date    COLONOSCOPY N/A 11/30/2016    COLONOSCOPY performed by Renard Driscoll MD at . [facility] LESROBYNFORCEHARMONY/CAUTERY  11/30/2016         HX BACK SURGERY      plate to upper neck    HX CERVICAL FUSION      HX ORTHOPAEDIC      left ring finger-trauma    HX ORTHOPAEDIC      right index finger    HX ORTHOPAEDIC  12/2/14    Bilateral total knee replacements    HX TONSILLECTOMY      HX UROLOGICAL      kidney stone extraction/stent         Family History   Problem Relation Age of Onset    No Known Problems Mother     Emphysema Father         Social History     Socioeconomic History    Marital status:      Spouse name: Not on file    Number of children: Not on file    Years of education: Not on file    Highest education level: Not on file   Occupational History    Not on file   Social Needs    Financial resource strain: Not on file    Food insecurity:     Worry: Not on file     Inability: Not on file    Transportation needs:     Medical: Not on file     Non-medical: Not on file   Tobacco Use    Smoking status: Current Every Day Smoker     Packs/day: 2.00     Years: 50.00     Pack years: 100.00     Types: Cigarettes    Smokeless tobacco: Never Used    Tobacco comment: electronic cigarettes in the past   Substance and Sexual Activity    Alcohol use: Yes     Alcohol/week: 3.0 standard drinks     Types: 3 Cans of beer per week     Comment: social -3 drinks weekly--rum    Drug use: No    Sexual activity: Yes     Partners: Female   Lifestyle    Physical activity:     Days per week: Not on file     Minutes per session: Not on file    Stress: Not on file   Relationships    Social connections:     Talks on phone: Not on file     Gets together: Not on file     Attends [employer] service: Not on file     Active member of club or organization: Not on file     Attends meetings of clubs or organizations: Not on file     Relationship status: Not on file    Intimate partner violence:     Fear of current or ex partner: Not on file     Emotionally abused: Not on file     Physically abused: Not on file     Forced sexual activity: Not on file   Other Topics Concern    Not on file   Social History Narrative    Not on file                ALLERGIES: Patient has no known allergies. Review of Systems   Constitutional: Negative for chills and weight loss. Respiratory: Positive for cough, shortness of breath and wheezing. Cardiovascular: Negative for chest pain. All other systems reviewed and are negative. Vitals:    10/14/19 1826 10/14/19 1828   BP:  126/70   Pulse:  70   Resp:  20   Temp:  98.7 °F (37.1 °C)   SpO2:  93%   Weight: 167 lb (75.8 kg)    Height: 5' 10\" (1.778 m)        Physical Exam   Constitutional: No distress. HENT:   Right Ear: Tympanic membrane and ear canal normal.   Left Ear: Tympanic membrane and ear canal normal.   Nose: Nose normal.   Mouth/Throat: No oropharyngeal exudate, posterior oropharyngeal edema or posterior oropharyngeal erythema. Eyes: Conjunctivae are normal. Right eye exhibits no discharge. Left eye exhibits no discharge. Neck: Neck supple. Pulmonary/Chest: No respiratory distress.  He has decreased breath sounds. He has wheezes. He has no rhonchi. He has no rales. Lymphadenopathy:     He has no cervical adenopathy. Skin: No rash noted. Nursing note and vitals reviewed. MDM    Procedures      ICD-10-CM ICD-9-CM    1. Shortness of breath R06.02 786.05 XR CHEST PA LAT   2. Chronic obstructive pulmonary disease with acute exacerbation (HCC) J44.1 491.21 XR CHEST PA LAT      guaiFENesin-codeine (ROBITUSSIN AC) 100-10 mg/5 mL solution     Medications Ordered Today   Medications    doxycycline (MONODOX) 100 mg capsule     Sig: Take 1 Cap by mouth two (2) times a day. Dispense:  20 Cap     Refill:  0    azithromycin (ZITHROMAX Z-JIM) 250 mg tablet     Sig: As directed     Dispense:  6 Tab     Refill:  0    predniSONE (DELTASONE) 20 mg tablet     Sig: 3 tab once  x 3 days, 2 tab once x 3 days, 1 tab once x 3 days and 1/2 tab once  x 3 days - With Breakfast     Dispense:  21 Tab     Refill:  0    guaiFENesin-codeine (ROBITUSSIN AC) 100-10 mg/5 mL solution     Sig: Take 5 mL by mouth three (3) times daily as needed for Cough for up to 7 days. Max Daily Amount: 15 mL. Dispense:  120 mL     Refill:  0    albuterol (PROVENTIL HFA, VENTOLIN HFA, PROAIR HFA) 90 mcg/actuation inhaler     Sig: Take 2 Puffs by inhalation every six (6) hours as needed for Wheezing. Dispense:  1 Inhaler     Refill:  0     Results for orders placed or performed in visit on 10/14/19   XR CHEST PA LAT    Narrative    EXAM: XR CHEST PA LAT    INDICATION: cough, short of breath    COMPARISON: Chest x-ray 7/15/2019. FINDINGS: A single view of the chest demonstrates hyperexpanded but otherwise  clear lungs. The cardiac and mediastinal contours and pulmonary vascularity are  normal. The bones and soft tissues show lower cervical ACDF plate and screws and  degenerative spine changes but otherwise are within normal limits. Impression    IMPRESSION: No acute findings. COPD.      The patients condition was discussed with the patient and they understand. The patient is to follow up with primary care doctor. If signs and symptoms become worse the pt is to go to the ER. The patient is to take medications as prescribed.

## 2019-10-29 ENCOUNTER — OFFICE VISIT (OUTPATIENT)
Dept: INTERNAL MEDICINE CLINIC | Age: 69
End: 2019-10-29

## 2019-10-29 VITALS
RESPIRATION RATE: 14 BRPM | DIASTOLIC BLOOD PRESSURE: 66 MMHG | TEMPERATURE: 97.9 F | BODY MASS INDEX: 24.45 KG/M2 | HEART RATE: 71 BPM | SYSTOLIC BLOOD PRESSURE: 121 MMHG | HEIGHT: 70 IN | WEIGHT: 170.8 LBS | OXYGEN SATURATION: 94 %

## 2019-10-29 DIAGNOSIS — Z23 ENCOUNTER FOR IMMUNIZATION: ICD-10-CM

## 2019-10-29 DIAGNOSIS — I10 ESSENTIAL HYPERTENSION: ICD-10-CM

## 2019-10-29 DIAGNOSIS — Z72.0 TOBACCO ABUSE DISORDER: ICD-10-CM

## 2019-10-29 DIAGNOSIS — M19.90 ARTHRITIS: ICD-10-CM

## 2019-10-29 DIAGNOSIS — Z87.891 PERSONAL HISTORY OF NICOTINE DEPENDENCE: ICD-10-CM

## 2019-10-29 DIAGNOSIS — J41.0 SIMPLE CHRONIC BRONCHITIS (HCC): ICD-10-CM

## 2019-10-29 DIAGNOSIS — Z13.6 SCREENING FOR ISCHEMIC HEART DISEASE: ICD-10-CM

## 2019-10-29 DIAGNOSIS — Z00.00 MEDICARE ANNUAL WELLNESS VISIT, SUBSEQUENT: Primary | ICD-10-CM

## 2019-10-29 RX ORDER — ALBUTEROL SULFATE 90 UG/1
2 AEROSOL, METERED RESPIRATORY (INHALATION)
Qty: 3 INHALER | Refills: 3 | Status: SHIPPED | OUTPATIENT
Start: 2019-10-29

## 2019-10-29 NOTE — PROGRESS NOTES
Robina Patel is a 68 y.o. male who presents for evaluation of awv. Last seen by me jan 16, 2018. Been struggling with copd of late, had few spells in past few months when he needed abx. Doing better now. Still smokes though. ROS: 
Constitutional: negative for fevers, chills, anorexia and weight loss Eyes:   negative for visual disturbance and irritation ENT:   negative for tinnitus,sore throat,nasal congestion,ear pain,hoarseness Respiratory:  negative for cough, hemoptysis, dyspnea,wheezing CV:   negative for chest pain, palpitations, lower extremity edema GI:   negative for nausea, vomiting, diarrhea, abdominal pain,melena Genitourinary: negative for frequency, dysuria and hematuria Musculoskel: negative for myalgias, arthralgias, back pain, muscle weakness, joint pain Neurological:  negative for headaches, dizziness, focal weakness, numbness Psychiatric:     Negative for depression or anxiety Past Medical History:  
Diagnosis Date  Diverticulitis  Hypertension  Ill-defined condition   
 kidney stones/ stant placed Past Surgical History:  
Procedure Laterality Date  COLONOSCOPY N/A 11/30/2016 COLONOSCOPY performed by Jennifer Ramos MD at [facility] ENDOSCOPY  COLONOSCOPY,ANSELMO CASTROFORCEP/CAUTERY  11/30/2016  HX BACK SURGERY    
 plate to upper neck  HX CERVICAL FUSION    
 HX ORTHOPAEDIC    
 left ring finger-trauma  HX ORTHOPAEDIC    
 right index finger  HX ORTHOPAEDIC  12/2/14 Bilateral total knee replacements  HX TONSILLECTOMY  HX UROLOGICAL    
 kidney stone extraction/stent Family History Problem Relation Age of Onset  No Known Problems Mother  Emphysema Father Social History Socioeconomic History  Marital status:  Spouse name: Not on file  Number of children: Not on file  Years of education: Not on file  Highest education level: Not on file Occupational History  Not on file Social Needs  Financial resource strain: Not on file  Food insecurity:  
  Worry: Not on file Inability: Not on file  Transportation needs:  
  Medical: Not on file Non-medical: Not on file Tobacco Use  Smoking status: Current Every Day Smoker Packs/day: 1.50 Years: 50.00 Pack years: 75.00 Types: Cigarettes  Smokeless tobacco: Never Used  Tobacco comment: electronic cigarettes in the past  
Substance and Sexual Activity  Alcohol use: Yes Alcohol/week: 3.0 standard drinks Types: 3 Cans of beer per week Comment: social -3 drinks weekly--rum  Drug use: No  
 Sexual activity: Yes  
  Partners: Female Lifestyle  Physical activity:  
  Days per week: Not on file Minutes per session: Not on file  Stress: Not on file Relationships  Social connections:  
  Talks on phone: Not on file Gets together: Not on file Attends Faith service: Not on file Active member of club or organization: Not on file Attends meetings of clubs or organizations: Not on file Relationship status: Not on file  Intimate partner violence:  
  Fear of current or ex partner: Not on file Emotionally abused: Not on file Physically abused: Not on file Forced sexual activity: Not on file Other Topics Concern  Not on file Social History Narrative  Not on file Visit Vitals /66 (BP 1 Location: Right arm, BP Patient Position: Sitting) Pulse 71 Temp 97.9 °F (36.6 °C) (Oral) Resp 14 Ht 5' 10\" (1.778 m) Wt 170 lb 12.8 oz (77.5 kg) SpO2 94% BMI 24.51 kg/m² Physical Examination:  
General - Well appearing male HEENT - PERRL, TM no erythema/opacification, normal nasal turbinates, no oropharyngeal erythema or exudate, MMM Neck - supple, no bruits, no thyroidomegaly, no lymphadenopathy Pulm - clear to auscultation bilaterally--good air flow Cardio - RRR, normal S1 S2, no murmur Abd - soft, nontender, no masses, no HSM Extrem - no edema, +2 distal pulses Neuro-  No focal deficits, CN intact Assessment/Plan: 1. Copd--stop symbicort. Samples and rx given for trelegy. And proair mdi 2. htn--controlled with hctz. Check cbc, cmp 3. Tobacco abuse--still smokes. Has been unable to quit in past.  Check CT chest and AAA ultrasound 4. Elevated psa--check again 5. Diffuse osteoarthritis--supportive care Both flu shot and prevnar 13 given today. Asked him to get glaucoma screen done as well. rtc 3 months Lea Thomson III, DO This is the Subsequent Medicare Annual Wellness Exam, performed 12 months or more after the Initial AWV or the last Subsequent AWV I have reviewed the patient's medical history in detail and updated the computerized patient record. History Patient Active Problem List  
Diagnosis Code  DJD (degenerative joint disease) of knee M17.10  Primary localized osteoarthrosis, lower leg M17.10  Tobacco abuse disorder Z72.0  Alcohol use Z72.89  Long term (current) use of non-steroidal anti-inflammatories (nsaid) Z79.1  Diverticulosis of colon with hemorrhage K57.31  
 Diverticulosis K57.90  
 SOB (shortness of breath) on exertion R06.02  
 Elevated PSA, less than 10 ng/ml R97.20  Essential hypertension I10  
 Herpes zoster without complication D03.6 Past Medical History:  
Diagnosis Date  Diverticulitis  Hypertension  Ill-defined condition   
 kidney stones/ stant placed Past Surgical History:  
Procedure Laterality Date  COLONOSCOPY N/A 11/30/2016 COL[facility]COPY performed [facility]  COLONOSCOPY,KADI DUARTE/DAKOTA  11/30/2016  HX BACK SURGERY    
 plate to upper neck  HX CERVICAL FUSION    
 HX ORTHOPAEDIC    
 left ring finger-trauma  HX ORTHOPAEDIC    
 right index finger  HX ORTHOPAEDIC  12/2/14 Bilateral total knee replacements  HX TONSILLECTOMY  HX UROLOGICAL    
 kidney stone extraction/stent Current Outpatient Medications Medication Sig Dispense Refill  albuterol (PROVENTIL HFA, VENTOLIN HFA, PROAIR HFA) 90 mcg/actuation inhaler Take 2 Puffs by inhalation every six (6) hours as needed for Wheezing. 1 Inhaler 0  
 hydroCHLOROthiazide (HYDRODIURIL) 25 mg tablet TK 1 T PO QD FOR HYPERTENSION  0  
 SYMBICORT 160-4.5 mcg/actuation HFAA inhale 2 puffs by mouth twice a day -- THIS IS *NOT* A ''RESCUE'' INHALER 10.2 Inhaler 12  
 acetaminophen (TYLENOL) 325 mg tablet Take 2 Tabs by mouth every six (6) hours as needed. 30 Tab 0 No Known Allergies Family History Problem Relation Age of Onset  No Known Problems Mother  Emphysema Father Social History Tobacco Use  Smoking status: Current Every Day Smoker Packs/day: 1.50 Years: 50.00 Pack years: 75.00 Types: Cigarettes  Smokeless tobacco: Never Used  Tobacco comment: electronic cigarettes in the past  
Substance Use Topics  Alcohol use: Yes Alcohol/week: 3.0 standard drinks Types: 3 Cans of beer per week Comment: social -3 drinks weekly--[facility] Depression Risk Factor Screening:  
 
3 most recent PHQ Screens 10/29/2019 Little interest or pleasure in doing things Not at all Feeling down, depressed, irritable, or hopeless Not at all Total Score PHQ 2 0 Alcohol Risk Factor Screening (MALE > 65): Do you average more 1 drink per night or more than 7 drinks a week: No 
 
In the past three months have you have had more than 4 drinks containing alcohol on one occasion: No 
 
 
Functional Ability and Level of Safety:  
Hearing: Hearing is good. Activities of Daily Living: The home contains: no safety equipment. Patient does total self care Ambulation: with no difficulty Fall Risk: 
Fall Risk Assessment, last 12 mths 10/29/2019 Able to walk?  Yes  
 Fall in past 12 months? Yes Fall with injury? No  
Number of falls in past 12 months 1 Fall Risk Score 1 Abuse Screen: 
Patient is not abused. Lives with wife of 50 years. Cognitive Screening Has your family/caregiver stated any concerns about your memory: no 
Cognitive Screening: Normal - MMSE (Mini Mental Status Exam) Patient Care Team  
Patient Care Team: 
Sharmila Busby DO as PCP - General (Internal Medicine) Sharmila Busby DO as PCP [facility] Lucas Carolina MD as Physician (Cardiology) Assessment/Plan Education and counseling provided: 
Are appropriate based on today's review and evaluation End-of-Life planning (with patient's consent) Pneumococcal Vaccine Influenza Vaccine Prostate cancer screening tests (PSA, covered annually) Screening for glaucoma Diagnoses and all orders for this visit: 1. Encounter for immunization 
-     INFLUENZA VACCINE INACTIVATED (IIV), SUBUNIT, ADJUVANTED, IM 
-     ADMIN PNEUMOCOCCAL VACCINE 
-     PNEUMOCOCCAL CONJ VACCINE 13 VALENT IM 2. Medicare annual wellness visit, subsequent Other orders 
-     albuterol (PROVENTIL HFA, VENTOLIN HFA, PROAIR HFA) 90 mcg/actuation inhaler; Take 2 Puffs by inhalation every six (6) hours as needed for Wheezing. Health Maintenance Due Topic Date Due  Shingrix Vaccine Age 50> (1 of 2) 08/28/1998  GLAUCOMA SCREENING Q2Y  08/28/2013  AAA Screening 73-69 YO Male Smoking Patients  08/28/2013  Pneumococcal 65+ years (1 of 2 - PCV13) 08/28/2013  MEDICARE YEARLY EXAM  07/14/2018  Influenza Age 5 to Adult  08/01/2019

## 2019-10-29 NOTE — PROGRESS NOTES
Reviewed record in preparation for visit and have obtained necessary documentation. Identified pt with two pt identifiers(name and ). Chief Complaint Patient presents with [facility] Annual Wellness Visit Health Maintenance Due Topic Date Due  Shingrix Vaccine Age 50> (1 of 2) 1998  GLAUCOMA SCREENING Q2Y  2013  AAA Screening 73-69 YO Male Smoking Patients  2013  Pneumococcal 65+ years (1 of 2 - PCV13) 2013  MEDICARE YEARLY EXAM  2018  Influenza Age 5 to Adult  2019 Mr. Kamila Olea has a reminder for a \"due or due soon\" health maintenance. I have asked that he discuss health maintenance topic(s) due with His  primary care provider. Coordination of Care Questionnaire: 
:  
 
1) Have you been to an emergency room, urgent care clinic since your last visit? yes Hospitalized since your last visit? no          
 
2) Have you seen or consulted any other health care providers outside of [address] [place] [address] since your last visit? no  (Include any pap smears or colon screenings in this section.) 3) Do you have an Advance Directive on file? yes 4) Are you interested in receiving information on Advance Directives? NO Patient is accompanied by self I have received verbal consent from Donnie Salazar to discuss any/all medical information while they are present in the room.

## 2019-10-29 NOTE — PATIENT INSTRUCTIONS
Office Policies Phone calls/patient messages: Please allow up to 24 hours for someone in the office to contact you about your call or message. Be mindful your provider may be out of the office or your message may require further review. We encourage you to use [employer] for your messages as this is a faster, more efficient way to communicate with our office Medication Refills: 
         
Prescription medications require 48-72 business hours to process. We encourage you to use [employer] for your refills. For controlled medications: Please allow 72 business hours to process. Certain medications may require you to  a written prescription at our office. NO narcotic/controlled medications will be prescribed after 4pm Monday through Friday or on weekends Form/Paperwork Completion: 
         
Please note a $25 fee may incur for all paperwork for completed by our providers. We ask that you allow 7-10 business days. Pre-payment is due prior to picking up/faxing the completed form. You may also download your forms to [employer] to have your doctor print off. Medicare Wellness Visit, Male The best way to live healthy is to have a lifestyle where you eat a well-balanced diet, exercise regularly, limit alcohol use, and quit all forms of tobacco/nicotine, if applicable. Regular preventive services are another way to keep healthy. Preventive services (vaccines, screening tests, monitoring & exams) can help personalize your care plan, which helps you manage your own care. Screening tests can find health problems at the earliest stages, when they are easiest to treat. [facility] follows the current, evidence-based guidelines published by the [facility] [place] (USPSTF) when recommending preventive services for our patients.  Because we follow these guidelines, sometimes recommendations change over time as research supports it. (For example, a prostate screening blood test is no longer routinely recommended for men with no symptoms). Of course, you and your doctor may decide to screen more often for some diseases, based on your risk and co-morbidities (chronic disease you are already diagnosed with). Preventive services for you include: - Medicare offers their members a free annual wellness visit, which is time for you and your primary care provider to discuss and plan for your preventive service needs. Take advantage of this benefit every year! 
-All adults over age 72 should receive the recommended pneumonia vaccines. Current USPSTF guidelines recommend a series of two vaccines for the best pneumonia protection.  
-All adults should have a flu vaccine yearly and tetanus vaccine every 10 years. 
-All adults age 48 and older should receive the shingles vaccines (series of two vaccines). -All adults age 38-68 who are overweight should have a diabetes screening test once every three years.  
-Other screening tests & preventive services for persons with diabetes include: an eye exam to screen for diabetic retinopathy, a kidney function test, a foot exam, and stricter control over your cholesterol.  
-Cardiovascular screening for adults with routine risk involves an electrocardiogram (ECG) at intervals determined by the provider.  
-Colorectal cancer screening should be done for adults age 54-65 with no increased risk factors for colorectal cancer. There are a number of acceptable methods of screening for this type of cancer. Each test has its own benefits and drawbacks. Discuss with your provider what is most appropriate for you during your annual wellness visit.  The different tests include: colonoscopy (considered the best screening method), a fecal occult blood test, a fecal DNA test, and sigmoidoscopy. 
-All adults born between [facility] should be screened once for Hepatitis C. 
 -An Abdominal Aortic Aneurysm (AAA) Screening is recommended for men age 73-68 who has ever smoked in their lifetime. Here is a list of your current Health Maintenance items (your personalized list of preventive services) with a due date: 
Health Maintenance Due Topic Date Due  Shingles Vaccine (1 of 2) 08/28/1998  Glaucoma Screening   08/28/2013  AAA Screening  08/28/2013  Pneumococcal Vaccine (1 of 2 - PCV13) 08/28/2013 [facility] Annual Well Visit  07/14/2018  Flu Vaccine  08/01/2019 Come back for fasting labs, lab opens 8 am, mon-fri. No appt needed.

## 2019-10-31 ENCOUNTER — HOSPITAL ENCOUNTER (OUTPATIENT)
Dept: LAB | Age: 69
Discharge: HOME | End: 2019-10-31
Payer: MEDICARE

## 2019-10-31 PROCEDURE — 85025 COMPLETE CBC W/AUTO DIFF WBC: CPT

## 2019-10-31 PROCEDURE — 84153 ASSAY OF PSA TOTAL: CPT

## 2019-10-31 PROCEDURE — 84443 ASSAY THYROID STIM HORMONE: CPT

## 2019-10-31 PROCEDURE — 83036 HEMOGLOBIN GLYCOSYLATED A1C: CPT

## 2019-10-31 PROCEDURE — 80061 LIPID PANEL: CPT

## 2019-10-31 PROCEDURE — 36415 COLL VENOUS BLD VENIPUNCTURE: CPT

## 2019-10-31 PROCEDURE — 80053 COMPREHEN METABOLIC PANEL: CPT

## 2019-11-01 LAB
ALBUMIN SERPL-MCNC: 3.5 G/DL (ref 3.5–4.8)
ALBUMIN/GLOB SERPL: 1.8 {RATIO} (ref 1.2–2.2)
ALP SERPL-CCNC: 68 IU/L (ref 39–117)
ALT SERPL-CCNC: 16 IU/L (ref 0–44)
AST SERPL-CCNC: 23 IU/L (ref 0–40)
BASOPHILS # BLD AUTO: 0 X10E3/UL (ref 0–0.2)
BASOPHILS NFR BLD AUTO: 0 %
BILIRUB SERPL-MCNC: 0.6 MG/DL (ref 0–1.2)
BUN SERPL-MCNC: 23 MG/DL (ref 8–27)
BUN/CREAT SERPL: 21 (ref 10–24)
CALCIUM SERPL-MCNC: 8.7 MG/DL (ref 8.6–10.2)
CHLORIDE SERPL-SCNC: 97 MMOL/L (ref 96–106)
CHOLEST SERPL-MCNC: 173 MG/DL (ref 100–199)
CO2 SERPL-SCNC: 31 MMOL/L (ref 20–29)
CREAT SERPL-MCNC: 1.12 MG/DL (ref 0.76–1.27)
EOSINOPHIL # BLD AUTO: 0.2 X10E3/UL (ref 0–0.4)
EOSINOPHIL NFR BLD AUTO: 2 %
ERYTHROCYTE [DISTWIDTH] IN BLOOD BY AUTOMATED COUNT: 13.9 % (ref 12.3–15.4)
EST. AVERAGE GLUCOSE BLD GHB EST-MCNC: 126 MG/DL
GLOBULIN SER CALC-MCNC: 1.9 G/DL (ref 1.5–4.5)
GLUCOSE SERPL-MCNC: 91 MG/DL (ref 65–99)
HBA1C MFR BLD: 6 % (ref 4.8–5.6)
HCT VFR BLD AUTO: 48.1 % (ref 37.5–51)
HDLC SERPL-MCNC: 66 MG/DL
HGB BLD-MCNC: 16.4 G/DL (ref 13–17.7)
IMM GRANULOCYTES # BLD AUTO: 0 X10E3/UL (ref 0–0.1)
IMM GRANULOCYTES NFR BLD AUTO: 0 %
LDLC SERPL CALC-MCNC: 96 MG/DL (ref 0–99)
LYMPHOCYTES # BLD AUTO: 0.7 X10E3/UL (ref 0.7–3.1)
LYMPHOCYTES NFR BLD AUTO: 9 %
MCH RBC QN AUTO: 32.5 PG (ref 26.6–33)
MCHC RBC AUTO-ENTMCNC: 34.1 G/DL (ref 31.5–35.7)
MCV RBC AUTO: 95 FL (ref 79–97)
MONOCYTES # BLD AUTO: 0.7 X10E3/UL (ref 0.1–0.9)
MONOCYTES NFR BLD AUTO: 8 %
NEUTROPHILS # BLD AUTO: 6.6 X10E3/UL (ref 1.4–7)
NEUTROPHILS NFR BLD AUTO: 81 %
PLATELET # BLD AUTO: 161 X10E3/UL (ref 150–450)
POTASSIUM SERPL-SCNC: 3.6 MMOL/L (ref 3.5–5.2)
PROT SERPL-MCNC: 5.4 G/DL (ref 6–8.5)
PSA SERPL-MCNC: 4.5 NG/ML (ref 0–4)
RBC # BLD AUTO: 5.04 X10E6/UL (ref 4.14–5.8)
SODIUM SERPL-SCNC: 143 MMOL/L (ref 134–144)
TRIGL SERPL-MCNC: 57 MG/DL (ref 0–149)
TSH SERPL DL<=0.005 MIU/L-ACNC: 1.57 UIU/ML (ref 0.45–4.5)
VLDLC SERPL CALC-MCNC: 11 MG/DL (ref 5–40)
WBC # BLD AUTO: 8.2 X10E3/UL (ref 3.4–10.8)

## 2019-11-01 NOTE — PROGRESS NOTES
Overall labs look really good. Continue with same meds. PSA remains just slightly elevated, but no change from 2 years ago.

## 2019-11-12 ENCOUNTER — HOSPITAL ENCOUNTER (OUTPATIENT)
Dept: ULTRASOUND IMAGING | Age: 69
Discharge: HOME | End: 2019-11-12
Attending: INTERNAL MEDICINE
Payer: MEDICARE

## 2019-11-12 ENCOUNTER — HOSPITAL ENCOUNTER (OUTPATIENT)
Dept: CT IMAGING | Age: 69
Discharge: HOME | End: 2019-11-12
Attending: INTERNAL MEDICINE
Payer: MEDICARE

## 2019-11-12 DIAGNOSIS — Z87.891 PERSONAL HISTORY OF NICOTINE DEPENDENCE: ICD-10-CM

## 2019-11-12 DIAGNOSIS — Z72.0 TOBACCO ABUSE DISORDER: ICD-10-CM

## 2019-11-12 DIAGNOSIS — Z13.6 SCREENING FOR ISCHEMIC HEART DISEASE: ICD-10-CM

## 2019-11-12 PROCEDURE — G0297 LDCT FOR LUNG CA SCREEN: HCPCS

## 2019-11-12 PROCEDURE — 76706 US ABDL AORTA SCREEN AAA: CPT

## 2019-11-12 NOTE — PROGRESS NOTES
Called, spoke to pt. Two identifiers confirmed. Notified pt of lab results/recommendations per Dr. Blanc Mention. Pt verbalized understanding of information discussed w/ no further questions at this time.

## 2019-11-12 NOTE — PROGRESS NOTES
Called, spoke to pt. Two identifiers confirmed. Pt notified of results/recommendations per Dr. Juma Mejias. Pt verbalized understanding of information discussed w/ no further questions at this time.

## 2019-11-12 NOTE — PROGRESS NOTES
Ct chest with no masses. Does have mild emphysema. Slightly increased size of ascending aortic aneurysm, from 41 mm to 44 mm. Will discuss further at 3 month follow up.

## 2020-01-01 ENCOUNTER — PATIENT OUTREACH (OUTPATIENT)
Dept: CASE MANAGEMENT | Age: 70
End: 2020-01-01

## 2020-01-01 LAB
COVID-19, XGCOVT: NOT DETECTED
HEALTH STATUS, XMCV2T: NORMAL
SOURCE, COVRS: NORMAL
SPECIMEN SOURCE, FCOV2M: NORMAL
SPECIMEN TYPE, XMCV1T: NORMAL

## 2020-01-30 ENCOUNTER — OFFICE VISIT (OUTPATIENT)
Dept: INTERNAL MEDICINE CLINIC | Age: 70
End: 2020-01-30

## 2020-01-30 VITALS
HEART RATE: 73 BPM | DIASTOLIC BLOOD PRESSURE: 70 MMHG | RESPIRATION RATE: 18 BRPM | OXYGEN SATURATION: 94 % | HEIGHT: 70 IN | TEMPERATURE: 97.5 F | BODY MASS INDEX: 24.71 KG/M2 | WEIGHT: 172.6 LBS | SYSTOLIC BLOOD PRESSURE: 105 MMHG

## 2020-01-30 DIAGNOSIS — I71.40 ABDOMINAL AORTIC ANEURYSM (AAA) 3.0 CM TO 5.5 CM IN DIAMETER IN MALE: ICD-10-CM

## 2020-01-30 DIAGNOSIS — Z72.0 TOBACCO ABUSE DISORDER: ICD-10-CM

## 2020-01-30 DIAGNOSIS — J41.0 SIMPLE CHRONIC BRONCHITIS (HCC): Primary | ICD-10-CM

## 2020-01-30 DIAGNOSIS — I10 ESSENTIAL HYPERTENSION: ICD-10-CM

## 2020-01-30 DIAGNOSIS — R07.9 CHEST PAIN, UNSPECIFIED TYPE: ICD-10-CM

## 2020-01-30 RX ORDER — HYDROCHLOROTHIAZIDE 25 MG/1
12.5 TABLET ORAL DAILY
Qty: 90 TAB | Refills: 3
Start: 2020-01-30 | End: 2021-01-01 | Stop reason: ALTCHOICE

## 2020-01-30 NOTE — PROGRESS NOTES
Reviewed record in preparation for visit and have obtained necessary documentation. Identified pt with two pt identifiers(name and ). Chief Complaint Patient presents with  Hypertension  Stiff Neck x4-5 days; has since went away  Chest Pain  
  since last appointment  Generalized Body Aches x3 in the last month Health Maintenance Due Topic Date Due  Shingrix Vaccine Age 50> (1 of 2) 1998  GLAUCOMA SCREENING Q2Y  2013 Mr. Sarah Beth Conley has a reminder for a \"due or due soon\" health maintenance. I have asked that he discuss health maintenance topic(s) due with His  primary care provider. Coordination of Care Questionnaire: 
:  
 
1) Have you been to an emergency room, urgent care clinic since your last visit? no  
Hospitalized since your last visit? no          
 
2) Have you seen or consulted any other health care providers outside of [address] [place] [address] since your last visit? no  (Include any pap smears or colon screenings in this section.) 3) Do you have an Advance Directive on file? no 
 
4) Are you interested in receiving information on Advance Directives? NO Patient is accompanied by self I have received verbal consent from Chrissy Quintana to discuss any/all medical information while they are present in the room.

## 2020-01-30 NOTE — PATIENT INSTRUCTIONS
Office Policies Phone calls/patient messages: Please allow up to 24 hours for someone in the office to contact you about your call or message. Be mindful your provider may be out of the office or your message may require further review. We encourage you to use [employer] for your messages as this is a faster, more efficient way to communicate with our office Medication Refills: 
         
Prescription medications require 48-72 business hours to process. We encourage you to use [employer] for your refills. For controlled medications: Please allow 72 business hours to process. Certain medications may require you to  a written prescription at our office. NO narcotic/controlled medications will be prescribed after 4pm Monday through Friday or on weekends Form/Paperwork Completion: 
         
Please note a $25 fee may incur for all paperwork for completed by our providers. We ask that you allow 7-10 business days. Pre-payment is due prior to picking up/faxing the completed form. You may also download your forms to [employer] to have your doctor print off. Abdominal Aortic Aneurysm: Care Instructions Your Care Instructions An abdominal aortic aneurysm is a stretched and bulging area of the aorta. The aorta is the large blood vessel that takes oxygen-rich blood from the heart to the rest of the body. This type of aneurysm is in the belly, where the aorta takes blood to the lower body. If an aneurysm gets too big, it can cause serious problems. A bulging aorta is weak and can burst, or rupture. This causes life-threatening bleeding. If your doctor has determined that your aneurysm is small and not growing fast, it is safe to watch the aneurysm carefully and wait on surgery. If the aneurysm is larger, surgery may be the safest choice.  In some cases, your doctor may be able to put in a type of graft, called a stent, to fix the aneurysm without doing major surgery. Follow-up care is a key part of your treatment and safety. Be sure to make and go to all appointments, and call your doctor if you are having problems. It's also a good idea to know your test results and keep a list of the medicines you take. How can you care for yourself at home? · Take your medicines exactly as prescribed. Call your doctor if you think you are having a problem with your medicine. You may take medicine to help lower blood pressure and cholesterol. · Follow a heart-healthy diet. ? Eat lots of fruits and vegetables, whole grains, fish, and low-fat or nonfat dairy foods. ? Eat lean meats. Limit saturated fat and avoid trans fat. ? Limit processed food, sodium, alcohol, and sweets. · If your doctor recommends it, get more exercise. Walking is a good choice. Bit by bit, increase the amount you walk every day. Try for at least 30 minutes on most days of the week. · Talk to your doctor about when you can do more active workouts. · Manage your weight. Being at a healthy weight will not likely change your aortic aneurysm, but it may lower the chance of complications if you ever need surgery. · Do not smoke or allow others to smoke around you. Smoking can make your condition worse. If you need help quitting, talk to your doctor about stop-smoking programs and medicines. These can increase your chances of quitting for good. When should you call for help? Call 911 anytime you think you may need emergency care.  For example, call if: 
  · You have severe pain in your belly, back, or chest.  
  · You passed out (lost consciousness).  
  · You have severe trouble breathing.  
 Call your doctor now or seek immediate medical care if: 
  · You are dizzy or lightheaded, or you feel like you may faint.  
  · One or both feet change color, are painful, feel cool, or burn or tingle.  
 Watch closely for changes in your health, and be sure to contact your doctor if you have any problems. Where can you learn more? Go to http://caprice-erich.info/. Enter G974 in the search box to learn more about \"Abdominal Aortic Aneurysm: Care Instructions. \" Current as of: September 26, 2018 Content Version: 12.2 © 8201-2826 [employer]. Care instructions adapted under license by [employer] (which disclaims liability or warranty for this information). If you have questions about a medical condition or this instruction, always ask your healthcare professional. [address] any warranty or liability for your use of this information. Cut dose of hctz to 12.5 mg daily, so take half a pill.

## 2020-01-30 NOTE — PROGRESS NOTES
Wendy Menjivar is a 68 y.o. male who presents for evaluation of routine follow up. Last seen by me oct 29, 2019 in [facility]. Started trelegy then, and he has noticed a nice improvement, but costs $480 per month. Has had a few episodes of right sided chest pain over the past month. No other symptoms. Lasts very briefly. ROS: 
Constitutional: negative for fevers, chills, anorexia and weight loss Eyes:   negative for visual disturbance and irritation ENT:   negative for tinnitus,sore throat,nasal congestion,ear pain,hoarseness Respiratory:  negative for cough, hemoptysis, dyspnea,wheezing CV:   negative for chest pain, palpitations, lower extremity edema GI:   negative for nausea, vomiting, diarrhea, abdominal pain,melena Genitourinary: negative for frequency, dysuria and hematuria Musculoskel: negative for myalgias, arthralgias, back pain, muscle weakness, joint pain Neurological:  negative for headaches, dizziness, focal weakness, numbness Psychiatric:     Negative for depression or anxiety Past Medical History:  
Diagnosis Date  Chronic obstructive pulmonary disease ([facility] [address]  Diverticulitis  Hypertension  Ill-defined condition   
 kidney stones/ stant placed Past Surgical History:  
Procedure Laterality Date  COLONOSCOPY N/A 11/30/2016 COLONOSCOPY performed by Mari [facility] MD at [facility] ENDOSCOPY  COLONOSCOPY,KADI DUARTE/CAUTERY  11/30/2016  HX BACK SURGERY    
 plate to upper neck  HX CERVICAL FUSION    
 HX ORTHOPAEDIC    
 left ring finger-trauma  HX ORTHOPAEDIC    
 right index finger  HX ORTHOPAEDIC  12/2/14 Bilateral total knee replacements  HX TONSILLECTOMY  HX UROLOGICAL    
 kidney stone extraction/stent Family History Problem Relation Age of Onset  No Known Problems Mother  Emphysema Father Social History Socioeconomic History  Marital status:  Spouse name: Not on file  Number of children: Not on file  Years of education: Not on file  Highest education level: Not on file Occupational History  Not on file Social Needs  Financial resource strain: Not on file  Food insecurity:  
  Worry: Not on file Inability: Not on file  Transportation needs:  
  Medical: Not on file Non-medical: Not on file Tobacco Use  Smoking status: Current Every Day Smoker Packs/day: 1.50 Years: 50.00 Pack years: 75.00 Types: Cigarettes  Smokeless tobacco: Never Used  Tobacco comment: electronic cigarettes in the past  
Substance and Sexual Activity  Alcohol use: Yes Alcohol/week: 3.0 standard drinks Types: 3 Cans of beer per week Comment: social -3 drinks weekly--rum  Drug use: No  
 Sexual activity: Yes  
  Partners: Female Lifestyle  Physical activity:  
  Days per week: Not on file Minutes per session: Not on file  Stress: Not on file Relationships  Social connections:  
  Talks on phone: Not on file Gets together: Not on file Attends [employer] service: Not on file Active member of club or organization: Not on file Attends meetings of clubs or organizations: Not on file Relationship status: Not on file  Intimate partner violence:  
  Fear of current or ex partner: Not on file Emotionally abused: Not on file Physically abused: Not on file Forced sexual activity: Not on file Other Topics Concern  Not on file Social History Narrative  Not on file Visit Vitals /70 (BP 1 Location: Right arm, BP Patient Position: Sitting) Pulse 73 Temp 97.5 °F (36.4 °C) (Oral) Resp 18 Ht 5' 10\" (1.778 m) Wt 172 lb 9.6 oz (78.3 kg) SpO2 94% BMI 24.77 kg/m² Physical Examination:  
General - Well appearing male HEENT - PERRL, TM no erythema/opacification, normal nasal turbinates, no oropharyngeal erythema or exudate, MMM 
 Neck - supple, no bruits, no thyroidomegaly, no lymphadenopathy Pulm - clear to auscultation bilaterally Cardio - RRR, normal S1 S2, no murmur Abd - soft, nontender, no masses, no HSM Extrem - no edema, +2 distal pulses Neuro-  No focal deficits, CN intact Assessment/Plan: 1. Copd--improved with trelegy, continue same. 2 samples given. 2.  Right sided chest pain--ecg with no acute findings. Sounds MSK. 
3.  Relative hypotension--decrease dose of hctz from 25 to 12.5 mg 
4. AAA, 3.1 cm--stable in size. Referral to vascular dr Ricki Jean 5. Tobacco abuse--encouraged to keep working on quitting 6. Elevated psa--4.5 7. Osteoarthritis--supportive care with nsaids 
 
rtc 6 months. Reminded to get glaucoma screen.  
 
 
 
Aneta Gains III, DO

## 2020-06-25 RX ORDER — FLUTICASONE FUROATE, UMECLIDINIUM BROMIDE AND VILANTEROL TRIFENATATE 100; 62.5; 25 UG/1; UG/1; UG/1
1 POWDER RESPIRATORY (INHALATION) DAILY
Qty: 1 INHALER | Refills: 11 | OUTPATIENT
Start: 2020-06-25 | End: 2020-07-22

## 2020-07-22 ENCOUNTER — APPOINTMENT (OUTPATIENT)
Dept: GENERAL RADIOLOGY | Age: 70
End: 2020-07-22
Attending: EMERGENCY MEDICINE
Payer: MEDICARE

## 2020-07-22 ENCOUNTER — HOSPITAL ENCOUNTER (EMERGENCY)
Age: 70
Discharge: HOME | End: 2020-07-22
Attending: EMERGENCY MEDICINE | Admitting: EMERGENCY MEDICINE
Payer: MEDICARE

## 2020-07-22 VITALS
HEIGHT: 69 IN | OXYGEN SATURATION: 92 % | TEMPERATURE: 97.9 F | BODY MASS INDEX: 24.52 KG/M2 | WEIGHT: 165.57 LBS | SYSTOLIC BLOOD PRESSURE: 127 MMHG | RESPIRATION RATE: 20 BRPM | DIASTOLIC BLOOD PRESSURE: 87 MMHG | HEART RATE: 67 BPM

## 2020-07-22 DIAGNOSIS — M70.22 OLECRANON BURSITIS OF LEFT ELBOW: Primary | ICD-10-CM

## 2020-07-22 PROCEDURE — 73080 X-RAY EXAM OF ELBOW: CPT

## 2020-07-22 PROCEDURE — 74011000250 HC RX REV CODE- 250: Performed by: EMERGENCY MEDICINE

## 2020-07-22 PROCEDURE — 74011250637 HC RX REV CODE- 250/637: Performed by: EMERGENCY MEDICINE

## 2020-07-22 PROCEDURE — 99283 EMERGENCY DEPT VISIT LOW MDM: CPT

## 2020-07-22 RX ORDER — TRAMADOL HYDROCHLORIDE 50 MG/1
50 TABLET ORAL
Qty: 6 TAB | Refills: 0 | Status: SHIPPED | OUTPATIENT
Start: 2020-07-22 | End: 2020-07-22

## 2020-07-22 RX ORDER — ACETAMINOPHEN 500 MG
1000 TABLET ORAL ONCE
Status: COMPLETED | OUTPATIENT
Start: 2020-07-22 | End: 2020-07-22

## 2020-07-22 RX ORDER — PREDNISONE 10 MG/1
30 TABLET ORAL
Qty: 9 TAB | Refills: 0 | Status: SHIPPED | OUTPATIENT
Start: 2020-07-22 | End: 2020-07-25

## 2020-07-22 RX ORDER — TRAMADOL HYDROCHLORIDE 50 MG/1
50 TABLET ORAL
Qty: 6 TAB | Refills: 0 | Status: SHIPPED | OUTPATIENT
Start: 2020-07-22 | End: 2020-07-25

## 2020-07-22 RX ORDER — BACITRACIN 500 UNIT/G
PACKET (EA) TOPICAL
Status: COMPLETED | OUTPATIENT
Start: 2020-07-22 | End: 2020-07-22

## 2020-07-22 RX ORDER — BACITRACIN 500 [USP'U]/G
OINTMENT TOPICAL
Status: DISCONTINUED | OUTPATIENT
Start: 2020-07-22 | End: 2020-07-22

## 2020-07-22 RX ADMIN — ACETAMINOPHEN 1000 MG: 500 TABLET ORAL at 07:07

## 2020-07-22 RX ADMIN — BACITRACIN 1 PACKET: 500 OINTMENT TOPICAL at 07:15

## 2020-07-22 NOTE — DISCHARGE INSTRUCTIONS
Patient Education        Bursitis of the Elbow: Care Instructions  Your Care Instructions  Bursitis is pain and swelling of the bursae. These are sacs of fluid that help your joints move smoothly. Olecranon bursitis is a type of bursitis that affects the back of the elbow. This is sometimes called Paul elbow because the bump that develops looks like the cartoon character Paul's elbow. Injury, overuse, or prolonged pressure on your elbow can cause this form of bursitis. Sometimes it happens when people have arthritis. It also can occur for unknown reasons. Treatment may include draining fluid from the bursa with a needle. If your doctor thought there was infection, he or she may have prescribed antibiotics. You also may get shots of medicine into the bursa to help the swelling go down. Your elbow should get better in a few days or weeks. Follow-up care is a key part of your treatment and safety. Be sure to make and go to all appointments, and call your doctor if you are having problems. It's also a good idea to know your test results and keep a list of the medicines you take. How can you care for yourself at home? · Take pain medicines exactly as directed. ? If the doctor gave you a prescription medicine for pain, take it as prescribed. ? If you are not taking a prescription pain medicine, ask your doctor if you can take an over-the-counter medicine. ? Do not take two or more pain medicines at the same time unless the doctor told you to. Many pain medicines have acetaminophen, which is Tylenol. Too much acetaminophen (Tylenol) can be harmful. · If your doctor prescribed antibiotics, take them as directed. Do not stop taking them just because you feel better. You need to take the full course of antibiotics. · If your doctor gave you a sling, an elastic bandage, or a compression sleeve, wear it exactly as instructed. · Put ice or a cold pack on your elbow for 10 to 20 minutes at a time.  Try to do this every 1 to 2 hours for the next 3 days (when you are awake) or until the swelling goes down. Put a thin cloth between the ice and your skin. · After 3 days, you can try heat, or alternate heat and ice. · Rest your elbow. Try to stop or reduce any activity that causes pain. · Wear elbow pads during physical activity to prevent injury. · Do not lean your elbows on tables or armrests. When should you call for help? Call your doctor now or seek immediate medical care if:  · You have new or worse symptoms of infection, such as:  ? Increased pain, swelling, warmth, or redness. ? Red streaks leading from the area. ? Pus draining from the area. ? A fever. Watch closely for changes in your health, and be sure to contact your doctor if:  · You do not get better as expected. Where can you learn more? Go to http://www.gray.com/  Enter B779 in the search box to learn more about \"Bursitis of the Elbow: Care Instructions. \"  Current as of: March 2, 2020               Content Version: 12.5  © 5132-6718 [employer]. Care instructions adapted under license by [employer] (which disclaims liability or warranty for this information). If you have questions about a medical condition or this instruction, always ask your healthcare professional. [place] [address] any warranty or liability for your use of this information. Patient Education        Elbow Bursitis: Exercises  Introduction  Here are some examples of exercises for you to try. The exercises may be suggested for a condition or for rehabilitation. Start each exercise slowly. Ease off the exercises if you start to have pain. You will be told when to start these exercises and which ones will work best for you. How to do the exercises  Elbow flexion stretch   1. Lift the arm that bothers you, and bend the elbow. Your palm should face toward you.   2. With your other hand, gently push on the back of your affected forearm. Press your hand toward your shoulder until you feel a stretch in the back of your upper arm. 3. Hold for at least 15 to 30 seconds. 4. Repeat 2 to 4 times. Elbow extension stretch   1. Extend your affected arm in front of you with your palm facing away from you. 2. Bend back your wrist, pointing your hand up toward the ceiling. 3. With your other hand, gently bend your wrist farther until you feel a mild to moderate stretch in your forearm. 4. Hold for at least 15 to 30 seconds. 5. Repeat 2 to 4 times. 6. Repeat steps 1 through 5. But this time extend your affected arm in front of you with your palm facing up. Then bend back your wrist, pointing your hand toward the floor. Pronation and supination stretch   1. Keep your affected elbow at your side, bent at about 90 degrees. Grasp a pen, pencil, or stick, and wrap your hand around it. If you don't have something to hold on to, make a fist instead. 2. Slowly turn your forearm as far as you can back and forth in each direction. Your hand should face up and then down. 3. Hold each position for about 6 seconds. 4. Relax for up to 10 seconds between repetitions. 5. Repeat 8 to 12 times. Hand flips   1. While seated, place your affected forearm on your thigh. Your palm should face down. 2. Flip your hand over so the back of your hand rests on your thigh and your palm is up. Alternate between palm up and palm down while keeping your forearm on your thigh. 3. Repeat 8 to 12 times. Follow-up care is a key part of your treatment and safety. Be sure to make and go to all appointments, and call your doctor if you are having problems. It's also a good idea to know your test results and keep a list of the medicines you take. Where can you learn more? Go to http://caprice-erich.info/  Enter [id number] in the search box to learn more about \"Elbow Bursitis: Exercises. \"  Current as of: March 2, 2020               Content Version: 12.5  © 6718-7475 [employer]. Care instructions adapted under license by "[employer] (which disclaims liability or warranty for this information). If you have questions about a medical condition or this instruction, always ask your healthcare professional. [address] any warranty or liability for your use of this information.

## 2020-07-22 NOTE — ED PROVIDER NOTES
EMERGENCY DEPARTMENT HISTORY AND PHYSICAL EXAM          Date: 7/22/2020  Patient Name: Donnie Salazar    History of Presenting Illness     Chief Complaint   Patient presents with    Fall     Pt ambulatory to triage with a cc of a fall (fell on left side) yesterday around 10 am. Pt is now c/o left arm pain 7/10/  Denies LOC or hitting head.  Arm Pain       History Provided By: Patient    HPI: Donnie Salazar is a 68 y.o. male, pmhx hypertension, COPD who presents ambulatory through triage to the ED c/o left arm pain    Patient explains that yesterday morning he was walking to take out the trash when he thinks he tripped over the curb landing on his left side. He states he did not lose consciousness and was able to just sit for a few seconds and get up on his own. He is complaining of some pain in the left mid arm across his elbow to just below his elbow. He denies any neck, chest, rib or head pain. He has been using Motrin to try to control the pain at home and his last dose was 4 AM.  Patient did drive to the emergency room on his own. Patient specifically denies any recent fevers, chills, dizziness, CP, SOB, urinary sxs, changes in BM, or headache. Also denies any anticoagulant use. PCP: Niecy Rueda DO    Allergies: nkda  Social Hx: +tobacco, +EtOH, -Illicit Drugs; There are no other complaints, changes, or physical findings at this time. Current Facility-Administered Medications   Medication Dose Route Frequency Provider Last Rate Last Dose    bacitracin 500 unit/gram packet   Topical NOW TermeerDominick MD         Current Outpatient Medications   Medication Sig Dispense Refill    predniSONE (DELTASONE) 10 mg tablet Take 30 mg by mouth daily (with breakfast) for 3 days. 9 Tab 0    traMADoL (ULTRAM) 50 mg tablet Take 1 Tab by mouth every six (6) hours as needed for Pain for up to 3 days. Max Daily Amount: 200 mg.  Indications: pain 6 Tab 0    hydroCHLOROthiazide (HYDRODIURIL) 25 mg tablet Take 0.5 Tabs by mouth daily. 90 Tab 3    fluticasone-umeclidinium-vilanterol (TRELEGY ELLIPTA) 100-62.5-25 mcg inhaler Take 1 Puff by inhalation daily. 2 Inhaler 0    albuterol (PROVENTIL HFA, VENTOLIN HFA, PROAIR HFA) 90 mcg/actuation inhaler Take 2 Puffs by inhalation every six (6) hours as needed for Wheezing. 3 Inhaler 3    acetaminophen (TYLENOL) 325 mg tablet Take 2 Tabs by mouth every six (6) hours as needed. 27 Tab 0       Past History     Past Medical History:  Past Medical History:   Diagnosis Date    Chronic obstructive pulmonary disease ([address]     Diverticulitis     Hypertension     Ill-defined condition     kidney stones/ stant placed       Past Surgical History:  Past Surgical History:   Procedure Laterality Date    COLONOSCOPY N/A 11/30/2016    COLONOSCOPY performed by Juan Moses MD at . [address],FORCEP/CAUTERY  11/30/2016         HX BACK SURGERY      plate to upper neck    HX CERVICAL FUSION      HX ORTHOPAEDIC      left ring finger-trauma    HX ORTHOPAEDIC      right index finger    HX ORTHOPAEDIC  12/2/14    Bilateral total knee replacements    HX TONSILLECTOMY      HX UROLOGICAL      kidney stone extraction/stent       Family History:  Family History   Problem Relation Age of Onset    No Known Problems Mother     Emphysema Father        Social History:  Social History     Tobacco Use    Smoking status: Current Every Day Smoker     Packs/day: 1.50     Years: 50.00     Pack years: 75.00     Types: Cigarettes    Smokeless tobacco: Never Used    Tobacco comment: electronic cigarettes in the past   Substance Use Topics    Alcohol use:  Yes     Alcohol/week: 3.0 standard drinks     Types: 3 Cans of beer per week     Comment: social -3 drinks weekly--rum    Drug use: No       Allergies:  No Known Allergies      Review of Systems   Review of Systems   Constitutional: Negative for activity change, appetite change, chills, fever and unexpected weight change. HENT: Negative for congestion. Eyes: Negative for pain and visual disturbance. Respiratory: Negative for cough and shortness of breath. Cardiovascular: Negative for chest pain. Gastrointestinal: Negative for abdominal pain, diarrhea, nausea and vomiting. Genitourinary: Negative for dysuria. Musculoskeletal: Positive for arthralgias and joint swelling. Negative for back pain, gait problem, neck pain and neck stiffness. Skin: Negative for rash. Neurological: Negative for headaches. Physical Exam   Physical Exam  Vitals signs and nursing note reviewed. Constitutional:       Appearance: He is well-developed. He is not diaphoretic. Comments: This is a tall elderly male with normal vital signs in minimal distress   HENT:      Head: Normocephalic and atraumatic. Eyes:      General:         Right eye: No discharge. Left eye: No discharge. Conjunctiva/sclera: Conjunctivae normal.      Pupils: Pupils are equal, round, and reactive to light. Neck:      Musculoskeletal: Normal range of motion and neck supple. Cardiovascular:      Rate and Rhythm: Normal rate and regular rhythm. Heart sounds: Normal heart sounds. No murmur. Pulmonary:      Effort: Pulmonary effort is normal. No respiratory distress. Breath sounds: Normal breath sounds. No wheezing or rales. Abdominal:      General: Bowel sounds are normal. There is no distension. Palpations: Abdomen is soft. Tenderness: There is no abdominal tenderness. Musculoskeletal: Normal range of motion. General: Tenderness (Mild point tenderness mid humerus, distal humerus, left elbow with painful elbow flexion and extension) present. No deformity. Right lower leg: No edema. Left lower leg: No edema. Skin:     General: Skin is warm and dry. Capillary Refill: Capillary refill takes less than 2 seconds. Findings: No rash.       Comments: Hematoma left forearm with small, central, superficial skin tear   Neurological:      Mental Status: He is alert and oriented to person, place, and time. Cranial Nerves: No cranial nerve deficit. Motor: No weakness or abnormal muscle tone. Gait: Gait normal.       Diagnostic Study Results     Labs -   No results found for this or any previous visit (from the past 12 hour(s)). Radiologic Studies -   XR ELBOW LT MIN 3 V   Final Result   IMPRESSION: No acute bony abnormality. Posterior soft tissue swelling compatible   with olecranon bursitis      XR FOREARM LT AP/LAT    (Results Pending)   XR HUMERUS LT    (Results Pending)     CT Results  (Last 48 hours)    None        CXR Results  (Last 48 hours)    None            Medical Decision Making   I am the first provider for this patient. I reviewed the vital signs, available nursing notes, past medical history, past surgical history, family history and social history. Vital Signs-Reviewed the patient's vital signs. Patient Vitals for the past 12 hrs:   Temp Pulse Resp BP SpO2   07/22/20 0534 99.1 °F (37.3 °C) 77 18 140/82 95 %       Pulse Oximetry Analysis - 95% on RA    Records Reviewed: Nursing Notes and Old Medical Records    Provider Notes (Medical Decision Making):   MDM: Elderly male not on anticoagulant presenting with a trip and fall yesterday. Patient's area of tenderness surrounding the left elbow. He has normal wrist and normal shoulder range of motion. X-ray to be given and given he drove to the emergency room I explained that all we can give him right now is Tylenol. ED Course:   Initial assessment performed. The patients presenting problems have been discussed, and they are in agreement with the care plan formulated and outlined with them. I have encouraged them to ask questions as they arise throughout their visit. Discharge note:  Pt re-evaluated and noted to be feeling better, ready for discharge.  Updated pt on all final x-ray findings. Will follow up as instructed. All questions have been answered, pt voiced understanding and agreement with plan. Narcotics were prescribed, pt was advised not to drive or operate heavy machinery. Specific return precautions provided as well as instructions to return to the ED should sx worsen at any time. Vital signs stable for discharge. Critical Care Time:   0      Diagnosis     Clinical Impression:   1. Olecranon bursitis of left elbow        PLAN:  1. Current Discharge Medication List      START taking these medications    Details   predniSONE (DELTASONE) 10 mg tablet Take 30 mg by mouth daily (with breakfast) for 3 days. Qty: 9 Tab, Refills: 0      traMADoL (ULTRAM) 50 mg tablet Take 1 Tab by mouth every six (6) hours as needed for Pain for up to 3 days. Max Daily Amount: 200 mg. Indications: pain  Qty: 6 Tab, Refills: 0    Associated Diagnoses: Olecranon bursitis of left elbow         CONTINUE these medications which have NOT CHANGED    Details   hydroCHLOROthiazide (HYDRODIURIL) 25 mg tablet Take 0.5 Tabs by mouth daily. Qty: 90 Tab, Refills: 3      fluticasone-umeclidinium-vilanterol (TRELEGY ELLIPTA) 100-62.5-25 mcg inhaler Take 1 Puff by inhalation daily. Qty: 2 Inhaler, Refills: 0      albuterol (PROVENTIL HFA, VENTOLIN HFA, PROAIR HFA) 90 mcg/actuation inhaler Take 2 Puffs by inhalation every six (6) hours as needed for Wheezing. Qty: 3 Inhaler, Refills: 3           2. Follow-up Information     Follow up With Specialties Details Why Contact Info    \[facility]\"" EMERGENCY DEPT Emergency Medicine  If symptoms worsen [address]  [address] [facility]  [phone]    [facility]  Schedule an appointment as soon as possible for a visit  [address]  [address] [facility] [place]  [phone]        Return to ED if worse     Disposition:  Home       Please note, this dictation was completed with [employer], the [employer] voice recognition software.  Quite often unanticipated grammatical, syntax, homophones, and other interpretive errors are inadvertently transcribed by the computer software. Please disregard these errors. Please excuse any errors that have escaped final proof reading.

## 2020-11-09 ENCOUNTER — HOSPITAL ENCOUNTER (EMERGENCY)
Age: 70
Discharge: HOME | End: 2020-11-09
Attending: EMERGENCY MEDICINE
Payer: MEDICARE

## 2020-11-09 ENCOUNTER — APPOINTMENT (OUTPATIENT)
Dept: GENERAL RADIOLOGY | Age: 70
End: 2020-11-09
Attending: EMERGENCY MEDICINE
Payer: MEDICARE

## 2020-11-09 VITALS
BODY MASS INDEX: 24.37 KG/M2 | RESPIRATION RATE: 15 BRPM | SYSTOLIC BLOOD PRESSURE: 169 MMHG | DIASTOLIC BLOOD PRESSURE: 82 MMHG | OXYGEN SATURATION: 91 % | WEIGHT: 170.19 LBS | TEMPERATURE: 98.1 F | HEART RATE: 100 BPM | HEIGHT: 70 IN

## 2020-11-09 DIAGNOSIS — I50.9 CONGESTIVE HEART FAILURE, UNSPECIFIED HF CHRONICITY, UNSPECIFIED HEART FAILURE TYPE (HCC): Primary | ICD-10-CM

## 2020-11-09 LAB
ALBUMIN SERPL-MCNC: 3.1 G/DL (ref 3.5–5)
ALBUMIN/GLOB SERPL: 1.1 {RATIO} (ref 1.1–2.2)
ALP SERPL-CCNC: 82 U/L (ref 45–117)
ALT SERPL-CCNC: 23 U/L (ref 12–78)
ANION GAP SERPL CALC-SCNC: 1 MMOL/L (ref 5–15)
AST SERPL-CCNC: 22 U/L (ref 15–37)
ATRIAL RATE: 69 BPM
BASOPHILS # BLD: 0 K/UL (ref 0–0.1)
BASOPHILS NFR BLD: 0 % (ref 0–1)
BILIRUB SERPL-MCNC: 0.6 MG/DL (ref 0.2–1)
BNP SERPL-MCNC: 898 PG/ML
BUN SERPL-MCNC: 22 MG/DL (ref 6–20)
BUN/CREAT SERPL: 25 (ref 12–20)
CALCIUM SERPL-MCNC: 8.7 MG/DL (ref 8.5–10.1)
CALCULATED P AXIS, ECG09: 61 DEGREES
CALCULATED R AXIS, ECG10: -82 DEGREES
CALCULATED T AXIS, ECG11: 48 DEGREES
CHLORIDE SERPL-SCNC: 107 MMOL/L (ref 97–108)
CO2 SERPL-SCNC: 35 MMOL/L (ref 21–32)
CREAT SERPL-MCNC: 0.87 MG/DL (ref 0.7–1.3)
DIAGNOSIS, 93000: NORMAL
DIFFERENTIAL METHOD BLD: ABNORMAL
EOSINOPHIL # BLD: 0.1 K/UL (ref 0–0.4)
EOSINOPHIL NFR BLD: 2 % (ref 0–7)
ERYTHROCYTE [DISTWIDTH] IN BLOOD BY AUTOMATED COUNT: 15.3 % (ref 11.5–14.5)
GLOBULIN SER CALC-MCNC: 2.7 G/DL (ref 2–4)
GLUCOSE SERPL-MCNC: 81 MG/DL (ref 65–100)
HCT VFR BLD AUTO: 46.4 % (ref 36.6–50.3)
HGB BLD-MCNC: 15 G/DL (ref 12.1–17)
IMM GRANULOCYTES # BLD AUTO: 0 K/UL (ref 0–0.04)
IMM GRANULOCYTES NFR BLD AUTO: 0 % (ref 0–0.5)
LYMPHOCYTES # BLD: 0.8 K/UL (ref 0.8–3.5)
LYMPHOCYTES NFR BLD: 10 % (ref 12–49)
MCH RBC QN AUTO: 32.6 PG (ref 26–34)
MCHC RBC AUTO-ENTMCNC: 32.3 G/DL (ref 30–36.5)
MCV RBC AUTO: 100.9 FL (ref 80–99)
MONOCYTES # BLD: 0.5 K/UL (ref 0–1)
MONOCYTES NFR BLD: 7 % (ref 5–13)
NEUTS SEG # BLD: 5.8 K/UL (ref 1.8–8)
NEUTS SEG NFR BLD: 80 % (ref 32–75)
NRBC # BLD: 0 K/UL (ref 0–0.01)
NRBC BLD-RTO: 0 PER 100 WBC
P-R INTERVAL, ECG05: 154 MS
PLATELET # BLD AUTO: 152 K/UL (ref 150–400)
PMV BLD AUTO: 10.6 FL (ref 8.9–12.9)
POTASSIUM SERPL-SCNC: 3.7 MMOL/L (ref 3.5–5.1)
PROT SERPL-MCNC: 5.8 G/DL (ref 6.4–8.2)
Q-T INTERVAL, ECG07: 394 MS
QRS DURATION, ECG06: 86 MS
QTC CALCULATION (BEZET), ECG08: 422 MS
RBC # BLD AUTO: 4.6 M/UL (ref 4.1–5.7)
SODIUM SERPL-SCNC: 143 MMOL/L (ref 136–145)
TROPONIN I SERPL-MCNC: <0.05 NG/ML
VENTRICULAR RATE, ECG03: 69 BPM
WBC # BLD AUTO: 7.3 K/UL (ref 4.1–11.1)

## 2020-11-09 PROCEDURE — 94640 AIRWAY INHALATION TREATMENT: CPT

## 2020-11-09 PROCEDURE — 84484 ASSAY OF TROPONIN QUANT: CPT

## 2020-11-09 PROCEDURE — 80053 COMPREHEN METABOLIC PANEL: CPT

## 2020-11-09 PROCEDURE — 36415 COLL VENOUS BLD VENIPUNCTURE: CPT

## 2020-11-09 PROCEDURE — 74011250637 HC RX REV CODE- 250/637: Performed by: EMERGENCY MEDICINE

## 2020-11-09 PROCEDURE — 99285 EMERGENCY DEPT VISIT HI MDM: CPT

## 2020-11-09 PROCEDURE — 85025 COMPLETE CBC W/AUTO DIFF WBC: CPT

## 2020-11-09 PROCEDURE — 83880 ASSAY OF NATRIURETIC PEPTIDE: CPT

## 2020-11-09 PROCEDURE — 74011250636 HC RX REV CODE- 250/636: Performed by: EMERGENCY MEDICINE

## 2020-11-09 PROCEDURE — 93005 ELECTROCARDIOGRAM TRACING: CPT

## 2020-11-09 PROCEDURE — 87635 SARS-COV-2 COVID-19 AMP PRB: CPT

## 2020-11-09 PROCEDURE — 71045 X-RAY EXAM CHEST 1 VIEW: CPT

## 2020-11-09 PROCEDURE — 96374 THER/PROPH/DIAG INJ IV PUSH: CPT

## 2020-11-09 RX ORDER — ALBUTEROL SULFATE 90 UG/1
6 AEROSOL, METERED RESPIRATORY (INHALATION)
Status: COMPLETED | OUTPATIENT
Start: 2020-11-09 | End: 2020-11-09

## 2020-11-09 RX ORDER — FUROSEMIDE 40 MG/1
40 TABLET ORAL DAILY
Qty: 7 TAB | Refills: 0 | Status: SHIPPED | OUTPATIENT
Start: 2020-11-09 | End: 2020-01-01

## 2020-11-09 RX ORDER — FUROSEMIDE 10 MG/ML
40 INJECTION INTRAMUSCULAR; INTRAVENOUS
Status: COMPLETED | OUTPATIENT
Start: 2020-11-09 | End: 2020-11-09

## 2020-11-09 RX ADMIN — FUROSEMIDE 40 MG: 10 INJECTION, SOLUTION INTRAMUSCULAR; INTRAVENOUS at 15:43

## 2020-11-09 RX ADMIN — ALBUTEROL SULFATE 6 PUFF: 90 AEROSOL, METERED RESPIRATORY (INHALATION) at 16:33

## 2020-11-09 NOTE — ED PROVIDER NOTES
EMERGENCY DEPARTMENT HISTORY AND PHYSICAL EXAM 
 
 
Date: 11/9/2020 Patient Name: Sugar Lee Please note that this dictation was completed with [employer], the computer voice recognition software. Quite often unanticipated grammatical, syntax, homophones, and other interpretive errors are inadvertently transcribed by the computer software. Please disregard these errors. Please excuse any errors that have escaped final proofreading. History of Presenting Illness Chief Complaint Patient presents with  Shortness of Breath Patient thinks he has COPD and his breathing has progressively gotten worse over the last year.  Foot Swelling Hasn't been to his PCP in awhile and has a multitude of issues including this swelling on his LE's also. History Provided By: Patient HPI: Sugar Lee, 65 y.o. male, with a past medical history significant for tobacco abuse, hypertension presenting the emergency department complaining of bilateral lower extremity edema and shortness of breath. Patient reports shortness of breath is worse with exertion, he reports PND. Is been getting over the last several days. Denies any fever, denies any cough. Denies any chest pain. Leg swelling is bilaterally. No prior history of DVT or PE. No hematochezia or melena. No other exacerbating relieving factors or associated symptoms at this time PCP: Elsa Marcial, DO No current facility-administered medications on file prior to encounter. Current Outpatient Medications on File Prior to Encounter Medication Sig Dispense Refill  fluticasone-umeclidinium-vilanterol (TRELEGY ELLIPTA) 100-62.5-25 mcg inhaler Take 1 Puff by inhalation daily. 2 Inhaler 0  
 albuterol (PROVENTIL HFA, VENTOLIN HFA, PROAIR HFA) 90 mcg/actuation inhaler Take 2 Puffs by inhalation every six (6) hours as needed for Wheezing.  3 Inhaler 3  
  hydroCHLOROthiazide (HYDRODIURIL) 25 mg tablet Take 0.5 Tabs by mouth daily. 90 Tab 3  [DISCONTINUED] acetaminophen (TYLENOL) 325 mg tablet Take 2 Tabs by mouth every six (6) hours as needed. 30 Tab 0 Past History Past Medical History: 
Past Medical History:  
Diagnosis Date  Chronic obstructive pulmonary disease ([facility] [address]  Diverticulitis  Hypertension  Ill-defined condition   
 kidney stones/ stant placed Past Surgical History: 
Past Surgical History:  
Procedure Laterality Date  COLONOSCOPY N/A 11/30/2016 COLONOSCOPY performed by Alie Evans MD at [facility] ENDOSCOPY  COLONOSCOPY,KADI DUARTE/CAUTERY  11/30/2016  HX BACK SURGERY    
 plate to upper neck  HX CERVICAL FUSION    
 HX ORTHOPAEDIC    
 left ring finger-trauma  HX ORTHOPAEDIC    
 right index finger  HX ORTHOPAEDIC  12/2/14 Bilateral total knee replacements  HX TONSILLECTOMY  HX UROLOGICAL    
 kidney stone extraction/stent Family History: 
Family History Problem Relation Age of Onset  No Known Problems Mother  Emphysema Father Social History: 
Social History Tobacco Use  Smoking status: Current Every Day Smoker Packs/day: 1.50 Years: 50.00 Pack years: 75.00 Types: Cigarettes  Smokeless tobacco: Never Used  Tobacco comment: electronic cigarettes in the past  
Substance Use Topics  Alcohol use: Yes Alcohol/week: 3.0 standard drinks Types: 3 Cans of beer per week Comment: social -3 drinks weekly--rum  Drug use: No  
 
 
Allergies: 
No Known Allergies Review of Systems Review of Systems Constitutional: Negative for chills and fever. HENT: Negative for congestion and sore throat. Eyes: Negative for visual disturbance. Respiratory: Positive for cough (chronic) and shortness of breath. Cardiovascular: Positive for leg swelling. Negative for chest pain. Gastrointestinal: Negative for abdominal pain, blood in stool, diarrhea and nausea. Endocrine: Negative for polyuria. Genitourinary: Negative for dysuria and testicular pain. Musculoskeletal: Negative for arthralgias, joint swelling and myalgias. Skin: Negative for rash. Allergic/Immunologic: Negative for immunocompromised state. Neurological: Negative for weakness and headaches. Hematological: Does not bruise/bleed easily. Psychiatric/Behavioral: Negative for confusion. Physical Exam  
Physical Exam 
Vitals signs and nursing note reviewed. Constitutional:   
   Appearance: He is well-developed. HENT:  
   Head: Normocephalic and atraumatic. Eyes:  
   General:     
   Right eye: No discharge. Left eye: No discharge. Conjunctiva/sclera: Conjunctivae normal.  
   Pupils: Pupils are equal, round, and reactive to light. Neck: Musculoskeletal: Normal range of motion and neck supple. Trachea: No tracheal deviation. Cardiovascular:  
   Rate and Rhythm: Normal rate and regular rhythm. Heart sounds: Normal heart sounds. No murmur. Pulmonary:  
   Effort: Pulmonary effort is normal. No respiratory distress. Breath sounds: Normal breath sounds. No wheezing or rales. Abdominal:  
   General: Bowel sounds are normal.  
   Palpations: Abdomen is soft. Tenderness: There is no abdominal tenderness. There is no guarding or rebound. Musculoskeletal: Normal range of motion. General: No tenderness or deformity. Right lower leg: Edema present. Left lower leg: Edema present. Skin: 
   General: Skin is warm and dry. Findings: No erythema or rash. Neurological:  
   Mental Status: He is alert and oriented to person, place, and time. Psychiatric:     
   Behavior: Behavior normal.  
 
 
 
Diagnostic Study Results Labs - Recent Results (from the past 12 hour(s)) EKG, 12 LEAD, INITIAL  Collection Time: 11/09/20 12:20 PM  
 Result Value Ref Range Ventricular Rate 69 BPM  
 Atrial Rate 69 BPM  
 P-R Interval 154 ms QRS Duration 86 ms  
 Q-T Interval 394 ms QTC Calculation (Bezet) 422 ms Calculated P Axis 61 degrees Calculated R Axis -82 degrees Calculated T Axis 48 degrees Diagnosis Normal sinus rhythm with sinus arrhythmia Possible Left atrial enlargement Left axis deviation Cannot rule out Anterior infarct , age undetermined When compared with ECG of 05-DEC-2016 19:24, No significant change was found CBC WITH AUTOMATED DIFF Collection Time: 11/09/20  1:07 PM  
Result Value Ref Range WBC 7.3 4.1 - 11.1 K/uL  
 RBC 4.60 4.10 - 5.70 M/uL  
 HGB 15.0 12.1 - 17.0 g/dL HCT 46.4 36.6 - 50.3 % .9 (H) 80.0 - 99.0 FL  
 MCH 32.6 26.0 - 34.0 PG  
 MCHC 32.3 30.0 - 36.5 g/dL  
 RDW 15.3 (H) 11.5 - 14.5 % PLATELET 004 234 - 669 K/uL MPV 10.6 8.9 - 12.9 FL  
 NRBC 0.0 0  WBC ABSOLUTE NRBC 0.00 0.00 - 0.01 K/uL NEUTROPHILS 80 (H) 32 - 75 % LYMPHOCYTES 10 (L) 12 - 49 % MONOCYTES 7 5 - 13 % EOSINOPHILS 2 0 - 7 % BASOPHILS 0 0 - 1 % IMMATURE GRANULOCYTES 0 0.0 - 0.5 % ABS. NEUTROPHILS 5.8 1.8 - 8.0 K/UL  
 ABS. LYMPHOCYTES 0.8 0.8 - 3.5 K/UL  
 ABS. MONOCYTES 0.5 0.0 - 1.0 K/UL  
 ABS. EOSINOPHILS 0.1 0.0 - 0.4 K/UL  
 ABS. BASOPHILS 0.0 0.0 - 0.1 K/UL  
 ABS. IMM. GRANS. 0.0 0.00 - 0.04 K/UL  
 DF AUTOMATED METABOLIC PANEL, COMPREHENSIVE Collection Time: 11/09/20  1:07 PM  
Result Value Ref Range Sodium 143 136 - 145 mmol/L Potassium 3.7 3.5 - 5.1 mmol/L Chloride 107 97 - 108 mmol/L  
 CO2 35 (H) 21 - 32 mmol/L Anion gap 1 (L) 5 - 15 mmol/L Glucose 81 65 - 100 mg/dL BUN 22 (H) 6 - 20 MG/DL Creatinine 0.87 0.70 - 1.30 MG/DL  
 BUN/Creatinine ratio 25 (H) 12 - 20 GFR est AA >60 >60 ml/min/1.73m2 GFR est non-AA >60 >60 ml/min/1.73m2 Calcium 8.7 8.5 - 10.1 MG/DL  Bilirubin, total 0.6 0.2 - 1.0 MG/DL  
 ALT (SGPT) 23 12 - 78 U/L  
 AST (SGOT) 22 15 - 37 U/L Alk. phosphatase 82 45 - 117 U/L Protein, total 5.8 (L) 6.4 - 8.2 g/dL Albumin 3.1 (L) 3.5 - 5.0 g/dL Globulin 2.7 2.0 - 4.0 g/dL A-G Ratio 1.1 1.1 - 2.2 NT-PRO BNP Collection Time: 11/09/20  1:07 PM  
Result Value Ref Range NT pro- (H) <125 PG/ML  
TROPONIN I Collection Time: 11/09/20  1:07 PM  
Result Value Ref Range Troponin-I, Qt. <0.05 <0.05 ng/mL SARS-COV-2 Collection Time: 11/09/20  3:48 PM  
Result Value Ref Range Specimen source Nasopharyngeal    
 SARS-CoV-2 PENDING   
 SARS-CoV-2 PENDING Specimen source Nasopharyngeal    
 COVID-19 rapid test PENDING Specimen type NP Swab Health status PENDING   
 COVID-19 PENDING Radiologic Studies -  
XR CHEST PORT Final Result IMPRESSION: No acute findings. CT Results  (Last 48 hours) None CXR Results  (Last 48 hours) 11/09/20 1319  XR CHEST PORT Final result Impression:  IMPRESSION: No acute findings. Narrative:  EXAM: XR CHEST PORT INDICATION: chf  
   
COMPARISON: 2019 FINDINGS: A portable AP radiograph of the chest was obtained at 1311 hours. The  
patient is on a cardiac monitor. The lungs are hyperinflated. There is  
centrilobular emphysema. There is atherosclerosis of the aorta. There is  
evidence of prior cervical fusion. Medical Decision Making I am the first provider for this patient. I reviewed the vital signs, available nursing notes, past medical history, past surgical history, family history and social history. Vital Signs-Reviewed the patient's vital signs. Patient Vitals for the past 12 hrs: 
 Temp Pulse Resp BP SpO2  
11/09/20 1710  100 15  91 % 11/09/20 1700  70 23 (!) 169/82 91 % 11/09/20 1634     90 % 11/09/20 1554  74 18  90 % 11/09/20 1543  70  (!) 161/83   
11/09/20 1530  61 23 (!) 161/83 (!) 88 % 11/09/20 1500  67 24 (!) 168/81 (!) 88 % 11/09/20 1430  70 19 (!) 163/94 92 % 11/09/20 1422  73 14 (!) 163/99 91 % 11/09/20 1330  66 15 (!) 161/85 90 % 11/09/20 1315  65 22 (!) 161/95 90 % 11/09/20 1300  65 20 (!) 156/88 91 % 11/09/20 1137 98.1 °F (36.7 °C) 70 18 (!) 178/105 91 % EKG interpretation: (Preliminary) EKG shows a sinus rhythm, rate 69. Left axis deviation. No evidence of ST elevation myocardial infarction. Interpreted by me Records Reviewed:  
Nursing notes, Prior visits Provider Notes (Medical Decision Making):  
Patient evaluated and found to be mildly hypoxic with oxygen around 89-90. This may be chronic for him. He is a chronic tobacco abuser. His lungs appear clear on exam.  No signs of a DVT. His bilateral lower extremity edema. Most consistent with heart failure. Will give a dose of Lasix. Will check an x-ray, labs, cardiac biomarkers, BNP. ED Course:  
Initial assessment performed. The patients presenting problems have been discussed, and they are in agreement with the care plan formulated and outlined with them. I have encouraged them to ask questions as they arise throughout their visit. ED Course as of Nov 09 2019 Mon Nov 09, 2020  
1734 Patient able to maintain oxygen saturations of approximately 90% ambulating around the department. These O2 sats are in the setting of known COPD. He states that he is feeling better. Diuresed approximately 1300 cc after Lasix. Patient would like to go home. I discussed the importance of close follow-up with primary care and cardiology. He is agreeable with this plan and promises to return for any new or worsening symptoms. Oksana Bey ED Course User Index Martin Best MD  
 
 
 
Patient's work-up is unremarkable. BNP mildly elevated. May have diastolic heart failure. Will give a dose of Lasix. Will ambulate patient. Patient signed out to Dr. Jorge Rivas pending lasix and ambulatory trial 
 
 
Critical Care Time:  
none Disposition: 
 
DISCHARGE NOTE Patients results have been reviewed with them. Patient and/or family have verbally conveyed their understanding and agreement of the patient's signs, symptoms, diagnosis, treatment and prognosis and additionally agree to follow up as recommended or return to the Emergency Room should their condition change or have any new concerns prior to their follow-up appointment. Patient verbally agrees with the care-plan and verbally conveys that all of their questions have been answered. Discharge instructions have also been provided to the patient with some educational information regarding their diagnosis as well a list of reasons why they would want to return to the ER prior to their follow-up appointment should their condition change. PLAN: 
1. Discharge Medication List as of 11/9/2020  5:36 PM  
  
START taking these medications Details  
furosemide (Lasix) 40 mg tablet Take 1 Tab by mouth daily for 7 days. , Normal, Disp-7 Tab,R-0 CONTINUE these medications which have NOT CHANGED Details  
fluticasone-umeclidinium-vilanterol (TRELEGY ELLIPTA) 100-62.5-25 mcg inhaler Take 1 Puff by inhalation daily. , Sample, Disp-2 Inhaler,R-0  
  
albuterol (PROVENTIL HFA, VENTOLIN HFA, PROAIR HFA) 90 mcg/actuation inhaler Take 2 Puffs by inhalation every six (6) hours as needed for Wheezing., Normal, Disp-3 Inhaler, R-3  
  
hydroCHLOROthiazide (HYDRODIURIL) 25 mg tablet Take 0.5 Tabs by mouth daily. , No Print, Disp-90 Tab,R-3 2. Follow-up Information Follow up With Specialties Details Why Contact Joesph Angel DO Internal Medicine Schedule an appointment as soon as possible for a visit  [address] 
[phone]  Archana Jain MD Cardiology, Internal Medicine Schedule an appointment as soon as possible for a visit  [address] [place] [address] [address] 
[phone] [facility] EMERGENCY DEPT Emergency Medicine  As needed, If symptoms worsen [address] [facility] 
[phone] Return to ED if worse Diagnosis Clinical Impression: 1. Congestive heart failure, unspecified HF chronicity, unspecified heart failure type ([facility] [address] Attestations:  
This note was completed by Michael Alvarez DO

## 2020-11-09 NOTE — DISCHARGE INSTRUCTIONS
Patient Education        Heart Failure: Care Instructions  Your Care Instructions     Heart failure occurs when your heart does not pump as much blood as the body needs. Failure does not mean that the heart has stopped pumping but rather that it is not pumping as well as it should. Over time, this causes fluid buildup in your lungs and other parts of your body. Fluid buildup can cause shortness of breath, fatigue, swollen ankles, and other problems. By taking medicines regularly, reducing sodium (salt) in your diet, checking your weight every day, and making lifestyle changes, you can feel better and live longer. Follow-up care is a key part of your treatment and safety. Be sure to make and go to all appointments, and call your doctor if you are having problems. It's also a good idea to know your test results and keep a list of the medicines you take. How can you care for yourself at home? Medicines    · Be safe with medicines. Take your medicines exactly as prescribed. Call your doctor if you think you are having a problem with your medicine.     · Do not take any vitamins, over-the-counter medicine, or herbal products without talking to your doctor first. Jeanie Bhaktaer not take ibuprofen (Advil or Motrin) and naproxen (Aleve) without talking to your doctor first. They could make your heart failure worse.     · You may take some of the following medicine. ? Angiotensin-converting enzyme inhibitors (ACEIs) or angiotensin II receptor blockers (ARBs) reduce the heart's workload, lower blood pressure, and reduce swelling. Taking an ACEI or ARB may lower your chance of needing to be hospitalized. ? Beta-blockers can slow heart rate, decrease blood pressure, and improve your condition. Taking a beta-blocker may lower your chance of needing to be hospitalized. ? Diuretics, also called water pills, reduce swelling. You will get more details on the specific medicines your doctor prescribes.   Diet    · Your doctor may suggest that you limit sodium. Your doctor can tell you how much sodium is right for you. An example is less than 3,000 mg a day. This includes all the salt you eat in cooking or in packaged foods. People get most of their sodium from processed foods. Fast food and restaurant meals also tend to be very high in sodium.     · Ask your doctor how much liquid you can drink each day. You may have to limit liquids. Weight    · Weigh yourself without clothing at the same time each day. Record your weight. Call your doctor if you have a sudden weight gain, such as more than 2 to 3 pounds in a day or 5 pounds in a week. (Your doctor may suggest a different range of weight gain.) A sudden weight gain may mean that your heart failure is getting worse. Activity level    · Start light exercise (if your doctor says it is okay). Even if you can only do a small amount, exercise will help you get stronger, have more energy, and manage your weight and your stress. Walking is an easy way to get exercise. Start out by walking a little more than you did before. Bit by bit, increase the amount you walk.     · When you exercise, watch for signs that your heart is working too hard. You are pushing yourself too hard if you cannot talk while you are exercising. If you become short of breath or dizzy or have chest pain, stop, sit down, and rest.     · If you feel \"wiped out\" the day after you exercise, walk slower or for a shorter distance until you can work up to a better pace.     · Get enough rest at night. Sleeping with 1 or 2 pillows under your upper body and head may help you breathe easier. Lifestyle changes    · Do not smoke. Smoking can make a heart condition worse. If you need help quitting, talk to your doctor about stop-smoking programs and medicines. These can increase your chances of quitting for good.  Quitting smoking may be the most important step you can take to protect your heart.     · Limit alcohol to 2 drinks a day for men and 1 drink a day for women. Too much alcohol can cause health problems.     · Avoid getting sick from colds and the flu. Get a pneumococcal vaccine shot. If you have had one before, ask your doctor whether you need another dose. Get a flu shot each year. If you must be around people with colds or the flu, wash your hands often. When should you call for help? Call 911 if you have symptoms of sudden heart failure such as:    · You have severe trouble breathing.     · You cough up pink, foamy mucus.     · You have a new irregular or rapid heartbeat. Call your doctor now or seek immediate medical care if:    · You have new or increased shortness of breath.     · You are dizzy or lightheaded, or you feel like you may faint.     · You have sudden weight gain, such as more than 2 to 3 pounds in a day or 5 pounds in a week. (Your doctor may suggest a different range of weight gain.)     · You have increased swelling in your legs, ankles, or feet.     · You are suddenly so tired or weak that you cannot do your usual activities. Watch closely for changes in your health, and be sure to contact your doctor if you develop new symptoms. Where can you learn more? Go to http://www.gray.com/  Enter B063 in the search box to learn more about \"Heart Failure: Care Instructions. \"  Current as of: December 16, 2019               Content Version: 12.6  © 2836-0211 [employer]. Care instructions adapted under license by [employer] (which disclaims liability or warranty for this information). If you have questions about a medical condition or this instruction, always ask your healthcare professional. [place] [address] any warranty or liability for your use of this information.

## 2020-11-09 NOTE — ED NOTES
He says he fell twice in past month or so He has near syncope at work recently but he caught hisself; he stops sits there but doesn't pass all the way out except once

## 2020-11-10 NOTE — PROGRESS NOTES
Patient contacted regarding COVID-19  risk. Discussed COVID-19 related testing which was pending at this time. Test results were pending. Patient informed of results, if available? Result Pending. Outreach made within 2 business days of discharge: Yes Care Transition Nurse/ Ambulatory Care Manager/ LPN Care Coordinator contacted the patient by telephone to perform post discharge assessment. Verified name and  with patient as identifiers. Provided introduction to self, and explanation of the CTN/ACM/LPN role, and reason for call due to risk factors for infection and/or exposure to COVID-19. Symptoms reviewed with patient who verbalized the following symptoms: cough, shortness of breath, no new symptoms and no worsening symptoms. Due to no new or worsening symptoms encounter was not routed to provider for escalation. Discussed follow-up appointments. If no appointment was previously scheduled, appointment scheduling offered: yes; pt declines . Pt was advised to f/u with PCP (Dr. Marylee Shavers Provider) post-discharge. [address] [place] [address] follow up appointment(s): No future appointments. [facility] follow up appointment(s):  Pt was advised to f/u with Dr. Uzma Serrano (Cardiology) post-discharge; pt reports office visit scheduled with Dr. Uzma Serrano for . Advance Care Planning:  
Does patient have an Advance Directive: yes; reviewed and current Patient has following risk factors of: heart failure and COPD. CTN/ACM/LPN reviewed discharge instructions, medical action plan and red flags such as increased shortness of breath, increasing fever and signs of decompensation with patient who verbalized understanding. Discussed exposure protocols and quarantine with CDC Guidelines What to do if you are sick with coronavirus disease .  Patient was given an opportunity for questions and concerns.  The patient agrees to contact the [facility] exposure line [phone], local [facility] department [place] [facility]  ([phone]) and PCP office for questions related to their healthcare. CTN/ACM provided contact information for future needs. Reviewed and educated patient on any new and changed medications related to discharge diagnosis; Rx - furosemide. Patient/family/caregiver given information for [employer] and agrees to enroll no Patient's preferred e-mail:  N/A Patient's preferred phone number: N/A Based on Loop alert triggers, patient will be contacted by nurse care manager for worsening symptoms. Plan for follow-up call in 1-2 days based on severity of symptoms and risk factors.

## 2021-01-01 ENCOUNTER — APPOINTMENT (OUTPATIENT)
Dept: CT IMAGING | Age: 71
DRG: 314 | End: 2021-01-01
Attending: EMERGENCY MEDICINE
Payer: MEDICARE

## 2021-01-01 ENCOUNTER — HOSPITAL ENCOUNTER (INPATIENT)
Age: 71
LOS: 4 days | Discharge: HOME | DRG: 314 | End: 2021-11-05
Attending: EMERGENCY MEDICINE | Admitting: GENERAL ACUTE CARE HOSPITAL
Payer: MEDICARE

## 2021-01-01 ENCOUNTER — PATIENT OUTREACH (OUTPATIENT)
Dept: CASE MANAGEMENT | Age: 71
End: 2021-01-01

## 2021-01-01 ENCOUNTER — TRANSCRIBE ORDER (OUTPATIENT)
Dept: SCHEDULING | Age: 71
End: 2021-01-01

## 2021-01-01 ENCOUNTER — APPOINTMENT (OUTPATIENT)
Dept: GENERAL RADIOLOGY | Age: 71
DRG: 189 | End: 2021-01-01
Attending: EMERGENCY MEDICINE
Payer: MEDICARE

## 2021-01-01 ENCOUNTER — HOSPITAL ENCOUNTER (OUTPATIENT)
Dept: RADIATION THERAPY | Age: 71
Discharge: HOME | End: 2021-05-18

## 2021-01-01 ENCOUNTER — APPOINTMENT (OUTPATIENT)
Dept: NON INVASIVE DIAGNOSTICS | Age: 71
DRG: 314 | End: 2021-01-01
Attending: STUDENT IN AN ORGANIZED HEALTH CARE EDUCATION/TRAINING PROGRAM
Payer: MEDICARE

## 2021-01-01 ENCOUNTER — HOSPITAL ENCOUNTER (OUTPATIENT)
Dept: PET IMAGING | Age: 71
Discharge: HOME | End: 2021-05-07
Attending: PHYSICIAN ASSISTANT
Payer: MEDICARE

## 2021-01-01 ENCOUNTER — HOSPITAL ENCOUNTER (INPATIENT)
Age: 71
LOS: 5 days | Discharge: HOME | DRG: 189 | End: 2021-04-19
Attending: EMERGENCY MEDICINE | Admitting: INTERNAL MEDICINE
Payer: MEDICARE

## 2021-01-01 ENCOUNTER — APPOINTMENT (OUTPATIENT)
Dept: NON INVASIVE DIAGNOSTICS | Age: 71
DRG: 314 | End: 2021-01-01
Attending: EMERGENCY MEDICINE
Payer: MEDICARE

## 2021-01-01 ENCOUNTER — TELEPHONE (OUTPATIENT)
Dept: INTERNAL MEDICINE CLINIC | Age: 71
End: 2021-01-01

## 2021-01-01 ENCOUNTER — OFFICE VISIT (OUTPATIENT)
Dept: INTERNAL MEDICINE CLINIC | Age: 71
End: 2021-01-01
Payer: MEDICARE

## 2021-01-01 ENCOUNTER — HOSPITAL ENCOUNTER (EMERGENCY)
Age: 71
Discharge: HOME | End: 2021-02-23
Attending: EMERGENCY MEDICINE
Payer: MEDICARE

## 2021-01-01 ENCOUNTER — APPOINTMENT (OUTPATIENT)
Dept: VASCULAR SURGERY | Age: 71
DRG: 189 | End: 2021-01-01
Attending: INTERNAL MEDICINE
Payer: MEDICARE

## 2021-01-01 ENCOUNTER — APPOINTMENT (OUTPATIENT)
Dept: CT IMAGING | Age: 71
DRG: 189 | End: 2021-01-01
Attending: EMERGENCY MEDICINE
Payer: MEDICARE

## 2021-01-01 ENCOUNTER — HOSPITAL ENCOUNTER (EMERGENCY)
Age: 71
End: 2021-11-10
Attending: EMERGENCY MEDICINE
Payer: MEDICARE

## 2021-01-01 ENCOUNTER — APPOINTMENT (OUTPATIENT)
Dept: GENERAL RADIOLOGY | Age: 71
DRG: 314 | End: 2021-01-01
Attending: EMERGENCY MEDICINE
Payer: MEDICARE

## 2021-01-01 VITALS
HEIGHT: 70 IN | DIASTOLIC BLOOD PRESSURE: 77 MMHG | HEART RATE: 74 BPM | OXYGEN SATURATION: 97 % | TEMPERATURE: 97.5 F | WEIGHT: 165 LBS | BODY MASS INDEX: 23.62 KG/M2 | RESPIRATION RATE: 16 BRPM | SYSTOLIC BLOOD PRESSURE: 118 MMHG

## 2021-01-01 VITALS
HEART RATE: 78 BPM | RESPIRATION RATE: 18 BRPM | HEIGHT: 70 IN | OXYGEN SATURATION: 92 % | TEMPERATURE: 97.7 F | DIASTOLIC BLOOD PRESSURE: 102 MMHG | BODY MASS INDEX: 24.59 KG/M2 | SYSTOLIC BLOOD PRESSURE: 182 MMHG | WEIGHT: 171.74 LBS

## 2021-01-01 VITALS
HEIGHT: 70 IN | SYSTOLIC BLOOD PRESSURE: 157 MMHG | HEART RATE: 88 BPM | RESPIRATION RATE: 18 BRPM | DIASTOLIC BLOOD PRESSURE: 82 MMHG | OXYGEN SATURATION: 91 % | WEIGHT: 168.65 LBS | BODY MASS INDEX: 24.14 KG/M2 | TEMPERATURE: 98.1 F

## 2021-01-01 VITALS
RESPIRATION RATE: 29 BRPM | BODY MASS INDEX: 24.59 KG/M2 | SYSTOLIC BLOOD PRESSURE: 121 MMHG | HEART RATE: 83 BPM | TEMPERATURE: 98 F | WEIGHT: 166.01 LBS | OXYGEN SATURATION: 92 % | DIASTOLIC BLOOD PRESSURE: 72 MMHG | HEIGHT: 69 IN

## 2021-01-01 VITALS — WEIGHT: 170 LBS | HEIGHT: 68 IN | BODY MASS INDEX: 25.76 KG/M2

## 2021-01-01 DIAGNOSIS — I31.39 PERICARDIAL EFFUSION (NONINFLAMMATORY): Primary | ICD-10-CM

## 2021-01-01 DIAGNOSIS — I71.40 ABDOMINAL AORTIC ANEURYSM (AAA) 3.0 CM TO 5.5 CM IN DIAMETER IN MALE: ICD-10-CM

## 2021-01-01 DIAGNOSIS — R97.20 ELEVATED PSA, LESS THAN 10 NG/ML: ICD-10-CM

## 2021-01-01 DIAGNOSIS — M10.00 ACUTE IDIOPATHIC GOUT, UNSPECIFIED SITE: ICD-10-CM

## 2021-01-01 DIAGNOSIS — S01.21XA: Primary | ICD-10-CM

## 2021-01-01 DIAGNOSIS — I30.9 ACUTE PERICARDIAL EFFUSION: Primary | ICD-10-CM

## 2021-01-01 DIAGNOSIS — R91.8 MULTIPLE LUNG NODULES: ICD-10-CM

## 2021-01-01 DIAGNOSIS — R91.1 PULMONARY NODULE: Primary | ICD-10-CM

## 2021-01-01 DIAGNOSIS — I31.39 PERICARDIAL EFFUSION: ICD-10-CM

## 2021-01-01 DIAGNOSIS — I31.4 CARDIAC/PERICARDIAL TAMPONADE: ICD-10-CM

## 2021-01-01 DIAGNOSIS — R73.03 PREDIABETES: ICD-10-CM

## 2021-01-01 DIAGNOSIS — E87.20 LACTIC ACIDOSIS: ICD-10-CM

## 2021-01-01 DIAGNOSIS — J96.01 ACUTE RESPIRATORY FAILURE WITH HYPOXIA (HCC): ICD-10-CM

## 2021-01-01 DIAGNOSIS — M25.531 ACUTE PAIN OF RIGHT WRIST: ICD-10-CM

## 2021-01-01 DIAGNOSIS — Z23 NEED FOR PROPHYLACTIC VACCINATION AGAINST DIPHTHERIA AND TETANUS: ICD-10-CM

## 2021-01-01 DIAGNOSIS — I26.99 ACUTE PULMONARY EMBOLISM WITHOUT ACUTE COR PULMONALE, UNSPECIFIED PULMONARY EMBOLISM TYPE (HCC): ICD-10-CM

## 2021-01-01 DIAGNOSIS — Z72.0 TOBACCO ABUSE DISORDER: ICD-10-CM

## 2021-01-01 DIAGNOSIS — I46.9 CARDIAC ARREST (HCC): Primary | ICD-10-CM

## 2021-01-01 DIAGNOSIS — Z00.00 MEDICARE ANNUAL WELLNESS VISIT, SUBSEQUENT: Primary | ICD-10-CM

## 2021-01-01 DIAGNOSIS — R63.4 WEIGHT LOSS: ICD-10-CM

## 2021-01-01 DIAGNOSIS — J43.8 OTHER EMPHYSEMA (HCC): ICD-10-CM

## 2021-01-01 DIAGNOSIS — R91.8 LUNG MASS: ICD-10-CM

## 2021-01-01 DIAGNOSIS — J44.1 ACUTE EXACERBATION OF CHRONIC OBSTRUCTIVE PULMONARY DISEASE (COPD) (HCC): ICD-10-CM

## 2021-01-01 DIAGNOSIS — J96.01 ACUTE RESPIRATORY FAILURE WITH HYPOXIA (HCC): Primary | ICD-10-CM

## 2021-01-01 DIAGNOSIS — R91.1 PULMONARY NODULE: ICD-10-CM

## 2021-01-01 LAB
% FREE PSA, 480797: 41.9 %
A1AT SERPL-MCNC: 228 MG/DL (ref 101–187)
ALBUMIN SERPL-MCNC: 3 G/DL (ref 3.5–5)
ALBUMIN SERPL-MCNC: 3 G/DL (ref 3.5–5)
ALBUMIN SERPL-MCNC: 3.2 G/DL (ref 3.5–5)
ALBUMIN SERPL-MCNC: 3.4 G/DL (ref 3.5–5)
ALBUMIN/GLOB SERPL: 1.1 {RATIO} (ref 1.1–2.2)
ALP SERPL-CCNC: 104 U/L (ref 45–117)
ALP SERPL-CCNC: 85 U/L (ref 45–117)
ALP SERPL-CCNC: 90 U/L (ref 45–117)
ALP SERPL-CCNC: 91 U/L (ref 45–117)
ALT SERPL-CCNC: 27 U/L (ref 12–78)
ALT SERPL-CCNC: 31 U/L (ref 12–78)
ALT SERPL-CCNC: 36 U/L (ref 12–78)
ALT SERPL-CCNC: 38 U/L (ref 12–78)
ANION GAP SERPL CALC-SCNC: 1 MMOL/L (ref 5–15)
ANION GAP SERPL CALC-SCNC: 1 MMOL/L (ref 5–15)
ANION GAP SERPL CALC-SCNC: 2 MMOL/L (ref 5–15)
ANION GAP SERPL CALC-SCNC: 3 MMOL/L (ref 5–15)
ANION GAP SERPL CALC-SCNC: 3 MMOL/L (ref 5–15)
ANION GAP SERPL CALC-SCNC: 5 MMOL/L (ref 5–15)
ANION GAP SERPL CALC-SCNC: ABNORMAL MMOL/L (ref 5–15)
APTT PPP: 25.6 SEC (ref 22.1–31)
APTT PPP: 47.4 SEC (ref 22.1–31)
APTT PPP: 49 SEC (ref 22.1–31)
APTT PPP: 51.7 SEC (ref 22.1–31)
APTT PPP: 59.8 SEC (ref 22.1–31)
APTT PPP: 90.4 SEC (ref 22.1–31)
APTT PPP: >130 SEC (ref 22.1–31)
ARTERIAL PATENCY WRIST A: ABNORMAL
AST SERPL-CCNC: 21 U/L (ref 15–37)
AST SERPL-CCNC: 23 U/L (ref 15–37)
AST SERPL-CCNC: 28 U/L (ref 15–37)
AST SERPL-CCNC: 43 U/L (ref 15–37)
ATRIAL RATE: 83 BPM
ATRIAL RATE: 89 BPM
AV R PG: 46.13 MMHG
BACTERIA SPEC CULT: NORMAL
BACTERIA SPEC CULT: NORMAL
BASE DEFICIT BLD-SCNC: 2.7 MMOL/L
BASE EXCESS BLD CALC-SCNC: 8 MMOL/L
BASOPHILS # BLD: 0 K/UL (ref 0–0.1)
BASOPHILS NFR BLD: 0 % (ref 0–1)
BDY SITE: ABNORMAL
BILIRUB SERPL-MCNC: 0.6 MG/DL (ref 0.2–1)
BILIRUB SERPL-MCNC: 0.7 MG/DL (ref 0.2–1)
BILIRUB SERPL-MCNC: 0.8 MG/DL (ref 0.2–1)
BILIRUB SERPL-MCNC: 0.8 MG/DL (ref 0.2–1)
BNP SERPL-MCNC: 1367 PG/ML
BUN SERPL-MCNC: 17 MG/DL (ref 6–20)
BUN SERPL-MCNC: 19 MG/DL (ref 6–20)
BUN SERPL-MCNC: 24 MG/DL (ref 6–20)
BUN SERPL-MCNC: 24 MG/DL (ref 6–20)
BUN SERPL-MCNC: 25 MG/DL (ref 6–20)
BUN SERPL-MCNC: 25 MG/DL (ref 6–20)
BUN SERPL-MCNC: 34 MG/DL (ref 6–20)
BUN/CREAT SERPL: 15 (ref 12–20)
BUN/CREAT SERPL: 18 (ref 12–20)
BUN/CREAT SERPL: 20 (ref 12–20)
BUN/CREAT SERPL: 22 (ref 12–20)
BUN/CREAT SERPL: 25 (ref 12–20)
BUN/CREAT SERPL: 27 (ref 12–20)
BUN/CREAT SERPL: 33 (ref 12–20)
CA-I BLD-MCNC: 1.22 MMOL/L (ref 1.12–1.32)
CA-I BLD-SCNC: 1.25 MMOL/L (ref 1.12–1.32)
CALCIUM SERPL-MCNC: 8.4 MG/DL (ref 8.5–10.1)
CALCIUM SERPL-MCNC: 8.7 MG/DL (ref 8.5–10.1)
CALCIUM SERPL-MCNC: 8.8 MG/DL (ref 8.5–10.1)
CALCIUM SERPL-MCNC: 8.8 MG/DL (ref 8.5–10.1)
CALCIUM SERPL-MCNC: 9 MG/DL (ref 8.5–10.1)
CALCIUM SERPL-MCNC: 9.1 MG/DL (ref 8.5–10.1)
CALCIUM SERPL-MCNC: 9.1 MG/DL (ref 8.5–10.1)
CALCULATED P AXIS, ECG09: 59 DEGREES
CALCULATED P AXIS, ECG09: 74 DEGREES
CALCULATED R AXIS, ECG10: -108 DEGREES
CALCULATED R AXIS, ECG10: -13 DEGREES
CALCULATED R AXIS, ECG10: -35 DEGREES
CALCULATED T AXIS, ECG11: 30 DEGREES
CALCULATED T AXIS, ECG11: 56 DEGREES
CALCULATED T AXIS, ECG11: 71 DEGREES
CHLORIDE BLD-SCNC: 103 MMOL/L (ref 100–108)
CHLORIDE SERPL-SCNC: 101 MMOL/L (ref 97–108)
CHLORIDE SERPL-SCNC: 102 MMOL/L (ref 97–108)
CHLORIDE SERPL-SCNC: 106 MMOL/L (ref 97–108)
CHLORIDE SERPL-SCNC: 106 MMOL/L (ref 97–108)
CHLORIDE SERPL-SCNC: 107 MMOL/L (ref 97–108)
CHLORIDE SERPL-SCNC: 108 MMOL/L (ref 97–108)
CHLORIDE SERPL-SCNC: 97 MMOL/L (ref 97–108)
CHOLEST SERPL-MCNC: 205 MG/DL
CK MB CFR SERPL CALC: 2.5 % (ref 0–2.5)
CK MB SERPL-MCNC: 2.2 NG/ML (ref 5–25)
CK SERPL-CCNC: 89 U/L (ref 39–308)
CO2 BLD-SCNC: 32 MMOL/L (ref 19–24)
CO2 SERPL-SCNC: 28 MMOL/L (ref 21–32)
CO2 SERPL-SCNC: 31 MMOL/L (ref 21–32)
CO2 SERPL-SCNC: 33 MMOL/L (ref 21–32)
CO2 SERPL-SCNC: 33 MMOL/L (ref 21–32)
CO2 SERPL-SCNC: 36 MMOL/L (ref 21–32)
CO2 SERPL-SCNC: 37 MMOL/L (ref 21–32)
CO2 SERPL-SCNC: 43 MMOL/L (ref 21–32)
COMMENT, HOLDF: NORMAL
COVID-19 RAPID TEST, COVR: NOT DETECTED
CREAT SERPL-MCNC: 0.94 MG/DL (ref 0.7–1.3)
CREAT SERPL-MCNC: 0.96 MG/DL (ref 0.7–1.3)
CREAT SERPL-MCNC: 1.03 MG/DL (ref 0.7–1.3)
CREAT SERPL-MCNC: 1.07 MG/DL (ref 0.7–1.3)
CREAT SERPL-MCNC: 1.1 MG/DL (ref 0.7–1.3)
CREAT SERPL-MCNC: 1.16 MG/DL (ref 0.7–1.3)
CREAT SERPL-MCNC: 1.23 MG/DL (ref 0.7–1.3)
CREAT UR-MCNC: 1.1 MG/DL (ref 0.6–1.3)
D DIMER PPP FEU-MCNC: 1.29 MG/L FEU (ref 0–0.65)
DIAGNOSIS, 93000: NORMAL
DIFFERENTIAL METHOD BLD: ABNORMAL
ECHO AO ROOT DIAM: 2.21 CM
ECHO AO ROOT DIAM: 3.96 CM
ECHO AR MAX VEL PISA: 336.86 CM/S
ECHO AV AREA PEAK VELOCITY: 2.62 CM2
ECHO AV AREA VTI: 2.65 CM2
ECHO AV AREA/BSA PEAK VELOCITY: 1.4 CM2/M2
ECHO AV AREA/BSA VTI: 1.4 CM2/M2
ECHO AV MEAN GRADIENT: 2.7 MMHG
ECHO AV MEAN GRADIENT: 3.33 MMHG
ECHO AV PEAK GRADIENT: 4.12 MMHG
ECHO AV PEAK GRADIENT: 4.95 MMHG
ECHO AV PEAK VELOCITY: 101.49 CM/S
ECHO AV PEAK VELOCITY: 111.23 CM/S
ECHO AV REGURGITANT PHT: 978.63 MS
ECHO AV VTI: 15.71 CM
ECHO AV VTI: 19.77 CM
ECHO LA MAJOR AXIS: 1.82 CM
ECHO LA MAJOR AXIS: 2.27 CM
ECHO LA MINOR AXIS: 0.95 CM
ECHO LA MINOR AXIS: 1.19 CM
ECHO LV EDV A4C: 67.34 ML
ECHO LV EDV INDEX A4C: 35.3 ML/M2
ECHO LV EJECTION FRACTION A4C: 60 PERCENT
ECHO LV ESV A4C: 26.74 ML
ECHO LV ESV INDEX A4C: 14 ML/M2
ECHO LV INTERNAL DIMENSION DIASTOLIC: 4.18 CM (ref 4.2–5.9)
ECHO LV INTERNAL DIMENSION DIASTOLIC: 4.48 CM (ref 4.2–5.9)
ECHO LV INTERNAL DIMENSION SYSTOLIC: 2.74 CM
ECHO LV INTERNAL DIMENSION SYSTOLIC: 3.78 CM
ECHO LV IVSD: 1.23 CM (ref 0.6–1)
ECHO LV IVSD: 1.26 CM (ref 0.6–1)
ECHO LV MASS 2D: 150.1 G (ref 88–224)
ECHO LV MASS 2D: 200.3 G (ref 88–224)
ECHO LV MASS INDEX 2D: 104.9 G/M2 (ref 49–115)
ECHO LV MASS INDEX 2D: 78.6 G/M2 (ref 49–115)
ECHO LV POSTERIOR WALL DIASTOLIC: 0.72 CM (ref 0.6–1)
ECHO LV POSTERIOR WALL DIASTOLIC: 1.38 CM (ref 0.6–1)
ECHO LVOT DIAM: 1.89 CM
ECHO LVOT DIAM: 2.28 CM
ECHO LVOT PEAK GRADIENT: 3.61 MMHG
ECHO LVOT PEAK VELOCITY: 94.97 CM/S
ECHO LVOT SV: 52.4 ML
ECHO LVOT VTI: 18.72 CM
ECHO MV MAX VELOCITY: 106.36 CM/S
ECHO MV MEAN GRADIENT: 1.98 MMHG
ECHO MV PEAK GRADIENT: 4.53 MMHG
ECHO MV VTI: 18.59 CM
ECHO PV MAX VELOCITY: 73.92 CM/S
ECHO PV MAX VELOCITY: 77.7 CM/S
ECHO PV PEAK INSTANTANEOUS GRADIENT SYSTOLIC: 2.19 MMHG
ECHO PV PEAK INSTANTANEOUS GRADIENT SYSTOLIC: 2.42 MMHG
ECHO TV REGURGITANT MAX VELOCITY: 120.67 CM/S
ECHO TV REGURGITANT PEAK GRADIENT: 5.82 MMHG
EOSINOPHIL # BLD: 0 K/UL (ref 0–0.4)
EOSINOPHIL # BLD: 0.1 K/UL (ref 0–0.4)
EOSINOPHIL # BLD: 0.1 K/UL (ref 0–0.4)
EOSINOPHIL # BLD: 0.2 K/UL (ref 0–0.4)
EOSINOPHIL NFR BLD: 0 % (ref 0–7)
EOSINOPHIL NFR BLD: 1 % (ref 0–7)
EOSINOPHIL NFR BLD: 1 % (ref 0–7)
EOSINOPHIL NFR BLD: 3 % (ref 0–7)
ERYTHROCYTE [DISTWIDTH] IN BLOOD BY AUTOMATED COUNT: 14.9 % (ref 11.5–14.5)
ERYTHROCYTE [DISTWIDTH] IN BLOOD BY AUTOMATED COUNT: 15.3 % (ref 11.5–14.5)
ERYTHROCYTE [DISTWIDTH] IN BLOOD BY AUTOMATED COUNT: 15.4 % (ref 11.5–14.5)
ERYTHROCYTE [DISTWIDTH] IN BLOOD BY AUTOMATED COUNT: 15.5 % (ref 11.5–14.5)
ERYTHROCYTE [DISTWIDTH] IN BLOOD BY AUTOMATED COUNT: 15.6 % (ref 11.5–14.5)
ERYTHROCYTE [DISTWIDTH] IN BLOOD BY AUTOMATED COUNT: 15.9 % (ref 11.5–14.5)
ERYTHROCYTE [SEDIMENTATION RATE] IN BLOOD: 1 MM/HR (ref 0–20)
EST. AVERAGE GLUCOSE BLD GHB EST-MCNC: 126 MG/DL
FOLATE SERPL-MCNC: 4.9 NG/ML (ref 5–21)
GAS FLOW.O2 O2 DELIVERY SYS: ABNORMAL L/MIN
GAS FLOW.O2 SETTING OXYMISER: 4 L/M
GLOBULIN SER CALC-MCNC: 2.7 G/DL (ref 2–4)
GLOBULIN SER CALC-MCNC: 2.8 G/DL (ref 2–4)
GLOBULIN SER CALC-MCNC: 3 G/DL (ref 2–4)
GLOBULIN SER CALC-MCNC: 3 G/DL (ref 2–4)
GLUCOSE BLD STRIP.AUTO-MCNC: 236 MG/DL (ref 74–106)
GLUCOSE BLD STRIP.AUTO-MCNC: 93 MG/DL (ref 65–100)
GLUCOSE BLD STRIP.AUTO-MCNC: 94 MG/DL (ref 65–117)
GLUCOSE SERPL-MCNC: 118 MG/DL (ref 65–100)
GLUCOSE SERPL-MCNC: 121 MG/DL (ref 65–100)
GLUCOSE SERPL-MCNC: 136 MG/DL (ref 65–100)
GLUCOSE SERPL-MCNC: 144 MG/DL (ref 65–100)
GLUCOSE SERPL-MCNC: 174 MG/DL (ref 65–100)
GLUCOSE SERPL-MCNC: 75 MG/DL (ref 65–100)
GLUCOSE SERPL-MCNC: 94 MG/DL (ref 65–100)
HBA1C MFR BLD: 6 % (ref 4–5.6)
HCO3 BLD-SCNC: 34.5 MMOL/L (ref 22–26)
HCO3 BLDA-SCNC: 30 MMOL/L
HCT VFR BLD AUTO: 35.7 % (ref 36.6–50.3)
HCT VFR BLD AUTO: 36.1 % (ref 36.6–50.3)
HCT VFR BLD AUTO: 41.5 % (ref 36.6–50.3)
HCT VFR BLD AUTO: 43.7 % (ref 36.6–50.3)
HCT VFR BLD AUTO: 48.2 % (ref 36.6–50.3)
HCT VFR BLD AUTO: 48.3 % (ref 36.6–50.3)
HCT VFR BLD AUTO: 49.8 % (ref 36.6–50.3)
HCT VFR BLD AUTO: 51 % (ref 36.6–50.3)
HCT VFR BLD AUTO: 51.1 % (ref 36.6–50.3)
HDLC SERPL-MCNC: 72 MG/DL
HDLC SERPL: 2.8 {RATIO} (ref 0–5)
HGB BLD-MCNC: 11.7 G/DL (ref 12.1–17)
HGB BLD-MCNC: 11.7 G/DL (ref 12.1–17)
HGB BLD-MCNC: 13.1 G/DL (ref 12.1–17)
HGB BLD-MCNC: 14.2 G/DL (ref 12.1–17)
HGB BLD-MCNC: 15 G/DL (ref 12.1–17)
HGB BLD-MCNC: 15.3 G/DL (ref 12.1–17)
HGB BLD-MCNC: 15.6 G/DL (ref 12.1–17)
HGB BLD-MCNC: 15.8 G/DL (ref 12.1–17)
HGB BLD-MCNC: 16.2 G/DL (ref 12.1–17)
IMM GRANULOCYTES # BLD AUTO: 0 K/UL (ref 0–0.04)
IMM GRANULOCYTES # BLD AUTO: 0.1 K/UL (ref 0–0.04)
IMM GRANULOCYTES NFR BLD AUTO: 0 % (ref 0–0.5)
IMM GRANULOCYTES NFR BLD AUTO: 1 % (ref 0–0.5)
LACTATE BLD-SCNC: 2.14 MMOL/L (ref 0.4–2)
LACTATE BLD-SCNC: 2.59 MMOL/L (ref 0.4–2)
LACTATE BLD-SCNC: 7.24 MMOL/L (ref 0.4–2)
LDLC SERPL CALC-MCNC: 114.4 MG/DL (ref 0–100)
LIPID PROFILE,FLP: ABNORMAL
LVOT MG: 1.72 MMHG
LYMPHOCYTES # BLD: 0.2 K/UL (ref 0.8–3.5)
LYMPHOCYTES # BLD: 0.3 K/UL (ref 0.8–3.5)
LYMPHOCYTES # BLD: 0.5 K/UL (ref 0.8–3.5)
LYMPHOCYTES # BLD: 0.6 K/UL (ref 0.8–3.5)
LYMPHOCYTES # BLD: 0.7 K/UL (ref 0.8–3.5)
LYMPHOCYTES # BLD: 0.9 K/UL (ref 0.8–3.5)
LYMPHOCYTES NFR BLD: 10 % (ref 12–49)
LYMPHOCYTES NFR BLD: 11 % (ref 12–49)
LYMPHOCYTES NFR BLD: 2 % (ref 12–49)
LYMPHOCYTES NFR BLD: 3 % (ref 12–49)
LYMPHOCYTES NFR BLD: 6 % (ref 12–49)
LYMPHOCYTES NFR BLD: 7 % (ref 12–49)
MAGNESIUM SERPL-MCNC: 2 MG/DL (ref 1.6–2.4)
MCH RBC QN AUTO: 32.3 PG (ref 26–34)
MCH RBC QN AUTO: 32.6 PG (ref 26–34)
MCH RBC QN AUTO: 32.7 PG (ref 26–34)
MCH RBC QN AUTO: 32.8 PG (ref 26–34)
MCH RBC QN AUTO: 32.8 PG (ref 26–34)
MCH RBC QN AUTO: 32.9 PG (ref 26–34)
MCH RBC QN AUTO: 32.9 PG (ref 26–34)
MCHC RBC AUTO-ENTMCNC: 30.5 G/DL (ref 30–36.5)
MCHC RBC AUTO-ENTMCNC: 31.1 G/DL (ref 30–36.5)
MCHC RBC AUTO-ENTMCNC: 31.6 G/DL (ref 30–36.5)
MCHC RBC AUTO-ENTMCNC: 31.7 G/DL (ref 30–36.5)
MCHC RBC AUTO-ENTMCNC: 31.7 G/DL (ref 30–36.5)
MCHC RBC AUTO-ENTMCNC: 31.8 G/DL (ref 30–36.5)
MCHC RBC AUTO-ENTMCNC: 32.4 G/DL (ref 30–36.5)
MCHC RBC AUTO-ENTMCNC: 32.5 G/DL (ref 30–36.5)
MCHC RBC AUTO-ENTMCNC: 32.8 G/DL (ref 30–36.5)
MCV RBC AUTO: 100 FL (ref 80–99)
MCV RBC AUTO: 100.8 FL (ref 80–99)
MCV RBC AUTO: 101.2 FL (ref 80–99)
MCV RBC AUTO: 102.2 FL (ref 80–99)
MCV RBC AUTO: 102.9 FL (ref 80–99)
MCV RBC AUTO: 103.2 FL (ref 80–99)
MCV RBC AUTO: 103.4 FL (ref 80–99)
MCV RBC AUTO: 105.2 FL (ref 80–99)
MCV RBC AUTO: 107.1 FL (ref 80–99)
MONOCYTES # BLD: 0.1 K/UL (ref 0–1)
MONOCYTES # BLD: 0.5 K/UL (ref 0–1)
MONOCYTES # BLD: 0.5 K/UL (ref 0–1)
MONOCYTES # BLD: 0.6 K/UL (ref 0–1)
MONOCYTES # BLD: 0.7 K/UL (ref 0–1)
MONOCYTES # BLD: 0.7 K/UL (ref 0–1)
MONOCYTES NFR BLD: 1 % (ref 5–13)
MONOCYTES NFR BLD: 10 % (ref 5–13)
MONOCYTES NFR BLD: 5 % (ref 5–13)
MONOCYTES NFR BLD: 6 % (ref 5–13)
MONOCYTES NFR BLD: 8 % (ref 5–13)
MONOCYTES NFR BLD: 8 % (ref 5–13)
NEUTS SEG # BLD: 5.1 K/UL (ref 1.8–8)
NEUTS SEG # BLD: 6.6 K/UL (ref 1.8–8)
NEUTS SEG # BLD: 6.6 K/UL (ref 1.8–8)
NEUTS SEG # BLD: 7.7 K/UL (ref 1.8–8)
NEUTS SEG # BLD: 8.1 K/UL (ref 1.8–8)
NEUTS SEG # BLD: 8.4 K/UL (ref 1.8–8)
NEUTS SEG NFR BLD: 79 % (ref 32–75)
NEUTS SEG NFR BLD: 80 % (ref 32–75)
NEUTS SEG NFR BLD: 85 % (ref 32–75)
NEUTS SEG NFR BLD: 85 % (ref 32–75)
NEUTS SEG NFR BLD: 91 % (ref 32–75)
NEUTS SEG NFR BLD: 97 % (ref 32–75)
NRBC # BLD: 0 K/UL (ref 0–0.01)
NRBC BLD-RTO: 0 PER 100 WBC
P-R INTERVAL, ECG05: 138 MS
P-R INTERVAL, ECG05: 168 MS
PCO2 BLD: 67.9 MMHG (ref 35–45)
PCO2 BLDV: 97.6 MMHG (ref 41–51)
PH BLD: 7.31 [PH] (ref 7.35–7.45)
PH BLDV: 7.1 [PH] (ref 7.32–7.42)
PLATELET # BLD AUTO: 124 K/UL (ref 150–400)
PLATELET # BLD AUTO: 124 K/UL (ref 150–400)
PLATELET # BLD AUTO: 125 K/UL (ref 150–400)
PLATELET # BLD AUTO: 126 K/UL (ref 150–400)
PLATELET # BLD AUTO: 147 K/UL (ref 150–400)
PLATELET # BLD AUTO: 149 K/UL (ref 150–400)
PLATELET # BLD AUTO: 151 K/UL (ref 150–400)
PLATELET # BLD AUTO: 152 K/UL (ref 150–400)
PLATELET # BLD AUTO: 166 K/UL (ref 150–400)
PMV BLD AUTO: 10.6 FL (ref 8.9–12.9)
PMV BLD AUTO: 10.7 FL (ref 8.9–12.9)
PMV BLD AUTO: 10.8 FL (ref 8.9–12.9)
PMV BLD AUTO: 10.8 FL (ref 8.9–12.9)
PMV BLD AUTO: 10.9 FL (ref 8.9–12.9)
PMV BLD AUTO: 10.9 FL (ref 8.9–12.9)
PMV BLD AUTO: 11 FL (ref 8.9–12.9)
PMV BLD AUTO: 11.3 FL (ref 8.9–12.9)
PMV BLD AUTO: 11.3 FL (ref 8.9–12.9)
PO2 BLD: 63 MMHG (ref 80–100)
PO2 BLDV: 21 MMHG (ref 25–40)
POTASSIUM BLD-SCNC: 3.3 MMOL/L (ref 3.5–5.5)
POTASSIUM SERPL-SCNC: 3.5 MMOL/L (ref 3.5–5.1)
POTASSIUM SERPL-SCNC: 3.6 MMOL/L (ref 3.5–5.1)
POTASSIUM SERPL-SCNC: 4 MMOL/L (ref 3.5–5.1)
POTASSIUM SERPL-SCNC: 4 MMOL/L (ref 3.5–5.1)
POTASSIUM SERPL-SCNC: 4.2 MMOL/L (ref 3.5–5.1)
POTASSIUM SERPL-SCNC: 4.4 MMOL/L (ref 3.5–5.1)
POTASSIUM SERPL-SCNC: 4.7 MMOL/L (ref 3.5–5.1)
PROT SERPL-MCNC: 5.7 G/DL (ref 6.4–8.2)
PROT SERPL-MCNC: 5.8 G/DL (ref 6.4–8.2)
PROT SERPL-MCNC: 6.2 G/DL (ref 6.4–8.2)
PROT SERPL-MCNC: 6.4 G/DL (ref 6.4–8.2)
PSA SERPL-MCNC: 4.7 NG/ML (ref 0–4)
PSA, FREE, 480853: 1.97 NG/ML
Q-T INTERVAL, ECG07: 284 MS
Q-T INTERVAL, ECG07: 360 MS
Q-T INTERVAL, ECG07: 370 MS
QRS DURATION, ECG06: 86 MS
QRS DURATION, ECG06: 92 MS
QRS DURATION, ECG06: 96 MS
QTC CALCULATION (BEZET), ECG08: 379 MS
QTC CALCULATION (BEZET), ECG08: 434 MS
QTC CALCULATION (BEZET), ECG08: 438 MS
RBC # BLD AUTO: 3.57 M/UL (ref 4.1–5.7)
RBC # BLD AUTO: 3.58 M/UL (ref 4.1–5.7)
RBC # BLD AUTO: 4.06 M/UL (ref 4.1–5.7)
RBC # BLD AUTO: 4.32 M/UL (ref 4.1–5.7)
RBC # BLD AUTO: 4.58 M/UL (ref 4.1–5.7)
RBC # BLD AUTO: 4.68 M/UL (ref 4.1–5.7)
RBC # BLD AUTO: 4.77 M/UL (ref 4.1–5.7)
RBC # BLD AUTO: 4.84 M/UL (ref 4.1–5.7)
RBC # BLD AUTO: 4.93 M/UL (ref 4.1–5.7)
RBC MORPH BLD: ABNORMAL
REFLEX CRITERIA: ABNORMAL
SAMPLES BEING HELD,HOLD: NORMAL
SAO2 % BLD: 88 % (ref 92–97)
SERVICE CMNT-IMP: ABNORMAL
SERVICE CMNT-IMP: NORMAL
SODIUM BLD-SCNC: 146 MMOL/L (ref 136–145)
SODIUM SERPL-SCNC: 139 MMOL/L (ref 136–145)
SODIUM SERPL-SCNC: 141 MMOL/L (ref 136–145)
SODIUM SERPL-SCNC: 142 MMOL/L (ref 136–145)
SODIUM SERPL-SCNC: 143 MMOL/L (ref 136–145)
SOURCE, COVRS: NORMAL
SPECIMEN SITE: ABNORMAL
SPECIMEN TYPE: ABNORMAL
THERAPEUTIC RANGE,PTTT: ABNORMAL SECS (ref 58–77)
THERAPEUTIC RANGE,PTTT: NORMAL SECS (ref 58–77)
TOTAL RESP. RATE, ITRR: 26
TRIGL SERPL-MCNC: 93 MG/DL (ref ?–150)
TROPONIN I SERPL-MCNC: <0.05 NG/ML
TROPONIN-HIGH SENSITIVITY: 27 NG/L (ref 0–76)
TSH SERPL DL<=0.05 MIU/L-ACNC: 0.86 UIU/ML (ref 0.36–3.74)
URATE SERPL-MCNC: 4.8 MG/DL (ref 3.5–7.2)
URATE SERPL-MCNC: 5.1 MG/DL (ref 3.5–7.2)
VENTRICULAR RATE, ECG03: 107 BPM
VENTRICULAR RATE, ECG03: 83 BPM
VENTRICULAR RATE, ECG03: 89 BPM
VIT B12 SERPL-MCNC: 657 PG/ML (ref 193–986)
VLDLC SERPL CALC-MCNC: 18.6 MG/DL
WBC # BLD AUTO: 10.7 K/UL (ref 4.1–11.1)
WBC # BLD AUTO: 6.4 K/UL (ref 4.1–11.1)
WBC # BLD AUTO: 6.6 K/UL (ref 4.1–11.1)
WBC # BLD AUTO: 7.8 K/UL (ref 4.1–11.1)
WBC # BLD AUTO: 8.2 K/UL (ref 4.1–11.1)
WBC # BLD AUTO: 8.4 K/UL (ref 4.1–11.1)
WBC # BLD AUTO: 9 K/UL (ref 4.1–11.1)
WBC # BLD AUTO: 9.1 K/UL (ref 4.1–11.1)
WBC # BLD AUTO: 9.3 K/UL (ref 4.1–11.1)

## 2021-01-01 PROCEDURE — 84550 ASSAY OF BLOOD/URIC ACID: CPT

## 2021-01-01 PROCEDURE — 65660000000 HC RM CCU STEPDOWN

## 2021-01-01 PROCEDURE — 99282 EMERGENCY DEPT VISIT SF MDM: CPT

## 2021-01-01 PROCEDURE — 74011250637 HC RX REV CODE- 250/637: Performed by: INTERNAL MEDICINE

## 2021-01-01 PROCEDURE — 77010033678 HC OXYGEN DAILY

## 2021-01-01 PROCEDURE — 2709999900 HC NON-CHARGEABLE SUPPLY

## 2021-01-01 PROCEDURE — 36415 COLL VENOUS BLD VENIPUNCTURE: CPT

## 2021-01-01 PROCEDURE — 99283 EMERGENCY DEPT VISIT LOW MDM: CPT

## 2021-01-01 PROCEDURE — 74011000636 HC RX REV CODE- 636: Performed by: EMERGENCY MEDICINE

## 2021-01-01 PROCEDURE — 80048 BASIC METABOLIC PNL TOTAL CA: CPT

## 2021-01-01 PROCEDURE — 74011250636 HC RX REV CODE- 250/636: Performed by: STUDENT IN AN ORGANIZED HEALTH CARE EDUCATION/TRAINING PROGRAM

## 2021-01-01 PROCEDURE — 74011250636 HC RX REV CODE- 250/636: Performed by: INTERNAL MEDICINE

## 2021-01-01 PROCEDURE — 82962 GLUCOSE BLOOD TEST: CPT

## 2021-01-01 PROCEDURE — 74011000258 HC RX REV CODE- 258: Performed by: INTERNAL MEDICINE

## 2021-01-01 PROCEDURE — 99285 EMERGENCY DEPT VISIT HI MDM: CPT

## 2021-01-01 PROCEDURE — 93308 TTE F-UP OR LMTD: CPT

## 2021-01-01 PROCEDURE — 93005 ELECTROCARDIOGRAM TRACING: CPT

## 2021-01-01 PROCEDURE — 87635 SARS-COV-2 COVID-19 AMP PRB: CPT

## 2021-01-01 PROCEDURE — 85025 COMPLETE CBC W/AUTO DIFF WBC: CPT

## 2021-01-01 PROCEDURE — 82607 VITAMIN B-12: CPT

## 2021-01-01 PROCEDURE — 94640 AIRWAY INHALATION TREATMENT: CPT

## 2021-01-01 PROCEDURE — 80053 COMPREHEN METABOLIC PANEL: CPT

## 2021-01-01 PROCEDURE — 85730 THROMBOPLASTIN TIME PARTIAL: CPT

## 2021-01-01 PROCEDURE — 96375 TX/PRO/DX INJ NEW DRUG ADDON: CPT

## 2021-01-01 PROCEDURE — A9552 F18 FDG: HCPCS

## 2021-01-01 PROCEDURE — 71045 X-RAY EXAM CHEST 1 VIEW: CPT

## 2021-01-01 PROCEDURE — 94760 N-INVAS EAR/PLS OXIMETRY 1: CPT

## 2021-01-01 PROCEDURE — 84295 ASSAY OF SERUM SODIUM: CPT

## 2021-01-01 PROCEDURE — 87040 BLOOD CULTURE FOR BACTERIA: CPT

## 2021-01-01 PROCEDURE — 83735 ASSAY OF MAGNESIUM: CPT

## 2021-01-01 PROCEDURE — 74011250636 HC RX REV CODE- 250/636: Performed by: EMERGENCY MEDICINE

## 2021-01-01 PROCEDURE — 74011000250 HC RX REV CODE- 250: Performed by: INTERNAL MEDICINE

## 2021-01-01 PROCEDURE — 74011000258 HC RX REV CODE- 258: Performed by: STUDENT IN AN ORGANIZED HEALTH CARE EDUCATION/TRAINING PROGRAM

## 2021-01-01 PROCEDURE — 74011000250 HC RX REV CODE- 250: Performed by: STUDENT IN AN ORGANIZED HEALTH CARE EDUCATION/TRAINING PROGRAM

## 2021-01-01 PROCEDURE — 82103 ALPHA-1-ANTITRYPSIN TOTAL: CPT

## 2021-01-01 PROCEDURE — 90471 IMMUNIZATION ADMIN: CPT

## 2021-01-01 PROCEDURE — G8536 NO DOC ELDER MAL SCRN: HCPCS | Performed by: INTERNAL MEDICINE

## 2021-01-01 PROCEDURE — G0439 PPPS, SUBSEQ VISIT: HCPCS | Performed by: INTERNAL MEDICINE

## 2021-01-01 PROCEDURE — G8510 SCR DEP NEG, NO PLAN REQD: HCPCS | Performed by: INTERNAL MEDICINE

## 2021-01-01 PROCEDURE — 85652 RBC SED RATE AUTOMATED: CPT

## 2021-01-01 PROCEDURE — 92950 HEART/LUNG RESUSCITATION CPR: CPT

## 2021-01-01 PROCEDURE — 93970 EXTREMITY STUDY: CPT

## 2021-01-01 PROCEDURE — 84484 ASSAY OF TROPONIN QUANT: CPT

## 2021-01-01 PROCEDURE — 71046 X-RAY EXAM CHEST 2 VIEWS: CPT

## 2021-01-01 PROCEDURE — 83605 ASSAY OF LACTIC ACID: CPT

## 2021-01-01 PROCEDURE — G8420 CALC BMI NORM PARAMETERS: HCPCS | Performed by: INTERNAL MEDICINE

## 2021-01-01 PROCEDURE — 74011000258 HC RX REV CODE- 258: Performed by: EMERGENCY MEDICINE

## 2021-01-01 PROCEDURE — G8427 DOCREV CUR MEDS BY ELIG CLIN: HCPCS | Performed by: INTERNAL MEDICINE

## 2021-01-01 PROCEDURE — 85379 FIBRIN DEGRADATION QUANT: CPT

## 2021-01-01 PROCEDURE — 77010033711 HC HIGH FLOW OXYGEN

## 2021-01-01 PROCEDURE — 1101F PT FALLS ASSESS-DOCD LE1/YR: CPT | Performed by: INTERNAL MEDICINE

## 2021-01-01 PROCEDURE — 85027 COMPLETE CBC AUTOMATED: CPT

## 2021-01-01 PROCEDURE — 96365 THER/PROPH/DIAG IV INF INIT: CPT

## 2021-01-01 PROCEDURE — 94762 N-INVAS EAR/PLS OXIMTRY CONT: CPT

## 2021-01-01 PROCEDURE — 94761 N-INVAS EAR/PLS OXIMETRY MLT: CPT

## 2021-01-01 PROCEDURE — 83880 ASSAY OF NATRIURETIC PEPTIDE: CPT

## 2021-01-01 PROCEDURE — 82803 BLOOD GASES ANY COMBINATION: CPT

## 2021-01-01 PROCEDURE — G8754 DIAS BP LESS 90: HCPCS | Performed by: INTERNAL MEDICINE

## 2021-01-01 PROCEDURE — 96361 HYDRATE IV INFUSION ADD-ON: CPT

## 2021-01-01 PROCEDURE — 74011000250 HC RX REV CODE- 250: Performed by: EMERGENCY MEDICINE

## 2021-01-01 PROCEDURE — 82550 ASSAY OF CK (CPK): CPT

## 2021-01-01 PROCEDURE — 93306 TTE W/DOPPLER COMPLETE: CPT

## 2021-01-01 PROCEDURE — 96374 THER/PROPH/DIAG INJ IV PUSH: CPT

## 2021-01-01 PROCEDURE — 77030029684 HC NEB SM VOL KT MONA -A

## 2021-01-01 PROCEDURE — G0463 HOSPITAL OUTPT CLINIC VISIT: HCPCS | Performed by: INTERNAL MEDICINE

## 2021-01-01 PROCEDURE — 36600 WITHDRAWAL OF ARTERIAL BLOOD: CPT

## 2021-01-01 PROCEDURE — 90715 TDAP VACCINE 7 YRS/> IM: CPT | Performed by: EMERGENCY MEDICINE

## 2021-01-01 PROCEDURE — 82746 ASSAY OF FOLIC ACID SERUM: CPT

## 2021-01-01 PROCEDURE — 71275 CT ANGIOGRAPHY CHEST: CPT

## 2021-01-01 PROCEDURE — 1111F DSCHRG MED/CURRENT MED MERGE: CPT | Performed by: INTERNAL MEDICINE

## 2021-01-01 PROCEDURE — 99214 OFFICE O/P EST MOD 30 MIN: CPT | Performed by: INTERNAL MEDICINE

## 2021-01-01 PROCEDURE — 74011250637 HC RX REV CODE- 250/637: Performed by: PHYSICIAN ASSISTANT

## 2021-01-01 PROCEDURE — G8752 SYS BP LESS 140: HCPCS | Performed by: INTERNAL MEDICINE

## 2021-01-01 PROCEDURE — 73110 X-RAY EXAM OF WRIST: CPT

## 2021-01-01 PROCEDURE — 3017F COLORECTAL CA SCREEN DOC REV: CPT | Performed by: INTERNAL MEDICINE

## 2021-01-01 RX ORDER — BUDESONIDE 0.25 MG/2ML
250 INHALANT ORAL
Status: DISCONTINUED | OUTPATIENT
Start: 2021-01-01 | End: 2021-01-01 | Stop reason: HOSPADM

## 2021-01-01 RX ORDER — ONDANSETRON 4 MG/1
4 TABLET, ORALLY DISINTEGRATING ORAL
Status: DISCONTINUED | OUTPATIENT
Start: 2021-01-01 | End: 2021-01-01 | Stop reason: HOSPADM

## 2021-01-01 RX ORDER — ONDANSETRON 2 MG/ML
4 INJECTION INTRAMUSCULAR; INTRAVENOUS
Status: DISCONTINUED | OUTPATIENT
Start: 2021-01-01 | End: 2021-01-01 | Stop reason: HOSPADM

## 2021-01-01 RX ORDER — IPRATROPIUM BROMIDE 0.5 MG/2.5ML
0.5 SOLUTION RESPIRATORY (INHALATION)
Status: DISCONTINUED | OUTPATIENT
Start: 2021-01-01 | End: 2021-01-01 | Stop reason: HOSPADM

## 2021-01-01 RX ORDER — ACETAMINOPHEN 325 MG/1
650 TABLET ORAL
Status: DISCONTINUED | OUTPATIENT
Start: 2021-01-01 | End: 2021-01-01 | Stop reason: HOSPADM

## 2021-01-01 RX ORDER — IBUPROFEN 200 MG
1 TABLET ORAL DAILY
Status: DISCONTINUED | OUTPATIENT
Start: 2021-01-01 | End: 2021-01-01 | Stop reason: HOSPADM

## 2021-01-01 RX ORDER — MUPIROCIN 20 MG/G
OINTMENT TOPICAL
Status: COMPLETED | OUTPATIENT
Start: 2021-01-01 | End: 2021-01-01

## 2021-01-01 RX ORDER — IBUPROFEN 200 MG
1 TABLET ORAL DAILY
Qty: 30 PATCH | Refills: 0 | Status: SHIPPED | OUTPATIENT
Start: 2021-01-01 | End: 2021-01-01

## 2021-01-01 RX ORDER — ACETAMINOPHEN 650 MG/1
650 SUPPOSITORY RECTAL
Status: DISCONTINUED | OUTPATIENT
Start: 2021-01-01 | End: 2021-01-01 | Stop reason: HOSPADM

## 2021-01-01 RX ORDER — PREDNISONE 20 MG/1
TABLET ORAL
Qty: 11 TAB | Refills: 0 | Status: SHIPPED | OUTPATIENT
Start: 2021-01-01 | End: 2021-01-01 | Stop reason: ALTCHOICE

## 2021-01-01 RX ORDER — AMIODARONE HYDROCHLORIDE 200 MG/1
TABLET ORAL
Qty: 30 TABLET | Refills: 2 | Status: SHIPPED | OUTPATIENT
Start: 2021-01-01 | End: 2021-01-01 | Stop reason: SDUPTHER

## 2021-01-01 RX ORDER — AMIODARONE HYDROCHLORIDE 200 MG/1
200 TABLET ORAL 2 TIMES DAILY
Status: DISCONTINUED | OUTPATIENT
Start: 2021-01-01 | End: 2021-01-01

## 2021-01-01 RX ORDER — IPRATROPIUM BROMIDE AND ALBUTEROL SULFATE 2.5; .5 MG/3ML; MG/3ML
3 SOLUTION RESPIRATORY (INHALATION)
Status: DISCONTINUED | OUTPATIENT
Start: 2021-01-01 | End: 2021-01-01 | Stop reason: HOSPADM

## 2021-01-01 RX ORDER — HEPARIN SODIUM 10000 [USP'U]/100ML
18-36 INJECTION, SOLUTION INTRAVENOUS
Status: DISCONTINUED | OUTPATIENT
Start: 2021-01-01 | End: 2021-01-01

## 2021-01-01 RX ORDER — HEPARIN SODIUM 1000 [USP'U]/ML
40 INJECTION, SOLUTION INTRAVENOUS; SUBCUTANEOUS AS NEEDED
Status: DISCONTINUED | OUTPATIENT
Start: 2021-01-01 | End: 2021-01-01

## 2021-01-01 RX ORDER — ARFORMOTEROL TARTRATE 15 UG/2ML
15 SOLUTION RESPIRATORY (INHALATION)
Status: DISCONTINUED | OUTPATIENT
Start: 2021-01-01 | End: 2021-01-01 | Stop reason: HOSPADM

## 2021-01-01 RX ORDER — IPRATROPIUM BROMIDE AND ALBUTEROL SULFATE 2.5; .5 MG/3ML; MG/3ML
SOLUTION RESPIRATORY (INHALATION)
Status: DISPENSED
Start: 2021-01-01 | End: 2021-01-01

## 2021-01-01 RX ORDER — APIXABAN 5 MG (74)
KIT ORAL
Qty: 1 DOSE PACK | Refills: 4 | Status: SHIPPED | OUTPATIENT
Start: 2021-01-01

## 2021-01-01 RX ORDER — APIXABAN 5 MG (74)
KIT ORAL
Qty: 1 DOSE PACK | Refills: 5 | Status: SHIPPED | OUTPATIENT
Start: 2021-01-01 | End: 2021-01-01

## 2021-01-01 RX ORDER — SODIUM CHLORIDE 0.9 % (FLUSH) 0.9 %
5-40 SYRINGE (ML) INJECTION EVERY 8 HOURS
Status: DISCONTINUED | OUTPATIENT
Start: 2021-01-01 | End: 2021-01-01 | Stop reason: HOSPADM

## 2021-01-01 RX ORDER — FLUTICASONE FUROATE, UMECLIDINIUM BROMIDE AND VILANTEROL TRIFENATATE 100; 62.5; 25 UG/1; UG/1; UG/1
POWDER RESPIRATORY (INHALATION)
Qty: 60 BLISTER | Refills: 11 | Status: SHIPPED | OUTPATIENT
Start: 2021-01-01

## 2021-01-01 RX ORDER — LABETALOL HYDROCHLORIDE 5 MG/ML
20 INJECTION, SOLUTION INTRAVENOUS
Status: DISCONTINUED | OUTPATIENT
Start: 2021-01-01 | End: 2021-01-01 | Stop reason: HOSPADM

## 2021-01-01 RX ORDER — ENOXAPARIN SODIUM 100 MG/ML
1 INJECTION SUBCUTANEOUS EVERY 12 HOURS
Status: DISCONTINUED | OUTPATIENT
Start: 2021-01-01 | End: 2021-01-01

## 2021-01-01 RX ORDER — ENOXAPARIN SODIUM 100 MG/ML
40 INJECTION SUBCUTANEOUS EVERY 24 HOURS
Status: DISCONTINUED | OUTPATIENT
Start: 2021-01-01 | End: 2021-01-01 | Stop reason: HOSPADM

## 2021-01-01 RX ORDER — ALBUTEROL SULFATE 0.83 MG/ML
SOLUTION RESPIRATORY (INHALATION)
Status: DISPENSED
Start: 2021-01-01 | End: 2021-01-01

## 2021-01-01 RX ORDER — SODIUM CHLORIDE 0.9 % (FLUSH) 0.9 %
5-40 SYRINGE (ML) INJECTION AS NEEDED
Status: DISCONTINUED | OUTPATIENT
Start: 2021-01-01 | End: 2021-01-01 | Stop reason: HOSPADM

## 2021-01-01 RX ORDER — HYDROCHLOROTHIAZIDE 25 MG/1
12.5 TABLET ORAL DAILY
Status: DISCONTINUED | OUTPATIENT
Start: 2021-01-01 | End: 2021-01-01 | Stop reason: HOSPADM

## 2021-01-01 RX ORDER — IPRATROPIUM BROMIDE AND ALBUTEROL SULFATE 2.5; .5 MG/3ML; MG/3ML
3 SOLUTION RESPIRATORY (INHALATION) ONCE
Status: COMPLETED | OUTPATIENT
Start: 2021-01-01 | End: 2021-01-01

## 2021-01-01 RX ORDER — AMIODARONE HYDROCHLORIDE 200 MG/1
400 TABLET ORAL 2 TIMES DAILY
Status: DISCONTINUED | OUTPATIENT
Start: 2021-01-01 | End: 2021-01-01 | Stop reason: HOSPADM

## 2021-01-01 RX ORDER — LIDOCAINE HYDROCHLORIDE 20 MG/ML
5 INJECTION, SOLUTION EPIDURAL; INFILTRATION; INTRACAUDAL; PERINEURAL ONCE
Status: DISCONTINUED | OUTPATIENT
Start: 2021-01-01 | End: 2021-01-01 | Stop reason: HOSPADM

## 2021-01-01 RX ORDER — HEPARIN SODIUM 1000 [USP'U]/ML
80 INJECTION, SOLUTION INTRAVENOUS; SUBCUTANEOUS AS NEEDED
Status: DISCONTINUED | OUTPATIENT
Start: 2021-01-01 | End: 2021-01-01

## 2021-01-01 RX ORDER — HEPARIN SODIUM 1000 [USP'U]/ML
80 INJECTION, SOLUTION INTRAVENOUS; SUBCUTANEOUS ONCE
Status: COMPLETED | OUTPATIENT
Start: 2021-01-01 | End: 2021-01-01

## 2021-01-01 RX ORDER — ALBUTEROL SULFATE 0.83 MG/ML
5 SOLUTION RESPIRATORY (INHALATION)
Status: ACTIVE | OUTPATIENT
Start: 2021-01-01 | End: 2021-01-01

## 2021-01-01 RX ORDER — POLYETHYLENE GLYCOL 3350 17 G/17G
17 POWDER, FOR SOLUTION ORAL DAILY PRN
Status: DISCONTINUED | OUTPATIENT
Start: 2021-01-01 | End: 2021-01-01 | Stop reason: HOSPADM

## 2021-01-01 RX ORDER — IPRATROPIUM BROMIDE AND ALBUTEROL SULFATE 2.5; .5 MG/3ML; MG/3ML
3 SOLUTION RESPIRATORY (INHALATION)
Qty: 30 NEBULE | Refills: 2 | Status: SHIPPED | OUTPATIENT
Start: 2021-01-01 | End: 2021-01-01

## 2021-01-01 RX ORDER — EPINEPHRINE 0.1 MG/ML
INJECTION INTRACARDIAC; INTRAVENOUS
Status: COMPLETED | OUTPATIENT
Start: 2021-01-01 | End: 2021-01-01

## 2021-01-01 RX ORDER — IPRATROPIUM BROMIDE AND ALBUTEROL SULFATE 2.5; .5 MG/3ML; MG/3ML
3 SOLUTION RESPIRATORY (INHALATION)
Status: COMPLETED | OUTPATIENT
Start: 2021-01-01 | End: 2021-01-01

## 2021-01-01 RX ORDER — SODIUM CHLORIDE 9 MG/ML
1000 INJECTION, SOLUTION INTRAVENOUS ONCE
Status: COMPLETED | OUTPATIENT
Start: 2021-01-01 | End: 2021-01-01

## 2021-01-01 RX ORDER — AMIODARONE HYDROCHLORIDE 200 MG/1
TABLET ORAL
Qty: 30 TABLET | Refills: 1 | Status: SHIPPED | OUTPATIENT
Start: 2021-01-01

## 2021-01-01 RX ORDER — FUROSEMIDE 20 MG/1
20 TABLET ORAL DAILY
Qty: 90 TAB | Refills: 3
Start: 2021-01-01 | End: 2021-01-01

## 2021-01-01 RX ORDER — IBUPROFEN 400 MG/1
400 TABLET ORAL
Status: DISCONTINUED | OUTPATIENT
Start: 2021-01-01 | End: 2021-01-01 | Stop reason: HOSPADM

## 2021-01-01 RX ORDER — SODIUM CHLORIDE 0.9 % (FLUSH) 0.9 %
10 SYRINGE (ML) INJECTION
Status: COMPLETED | OUTPATIENT
Start: 2021-01-01 | End: 2021-01-01

## 2021-01-01 RX ORDER — NOREPINEPHRINE BITARTRATE/D5W 8 MG/250ML
.5-16 PLASTIC BAG, INJECTION (ML) INTRAVENOUS
Status: ACTIVE | OUTPATIENT
Start: 2021-01-01 | End: 2021-01-01

## 2021-01-01 RX ADMIN — IPRATROPIUM BROMIDE 0.5 MG: 0.5 SOLUTION RESPIRATORY (INHALATION) at 14:26

## 2021-01-01 RX ADMIN — HYDROCHLOROTHIAZIDE 12.5 MG: 25 TABLET ORAL at 08:07

## 2021-01-01 RX ADMIN — IPRATROPIUM BROMIDE AND ALBUTEROL SULFATE 3 ML: .5; 3 SOLUTION RESPIRATORY (INHALATION) at 14:00

## 2021-01-01 RX ADMIN — METHYLPREDNISOLONE SODIUM SUCCINATE 80 MG: 40 INJECTION, POWDER, FOR SOLUTION INTRAMUSCULAR; INTRAVENOUS at 15:56

## 2021-01-01 RX ADMIN — HEPARIN SODIUM 3010 UNITS: 1000 INJECTION INTRAVENOUS; SUBCUTANEOUS at 17:55

## 2021-01-01 RX ADMIN — BUDESONIDE 250 MCG: 0.25 INHALANT RESPIRATORY (INHALATION) at 20:00

## 2021-01-01 RX ADMIN — IPRATROPIUM BROMIDE 0.5 MG: 0.5 SOLUTION RESPIRATORY (INHALATION) at 09:03

## 2021-01-01 RX ADMIN — EPINEPHRINE 1 MG: 0.1 INJECTION, SOLUTION ENDOTRACHEAL; INTRACARDIAC; INTRAVENOUS at 11:45

## 2021-01-01 RX ADMIN — AMIODARONE HYDROCHLORIDE 200 MG: 200 TABLET ORAL at 17:28

## 2021-01-01 RX ADMIN — IPRATROPIUM BROMIDE AND ALBUTEROL SULFATE 3 ML: .5; 3 SOLUTION RESPIRATORY (INHALATION) at 19:19

## 2021-01-01 RX ADMIN — BUDESONIDE 250 MCG: 0.25 INHALANT RESPIRATORY (INHALATION) at 19:20

## 2021-01-01 RX ADMIN — SODIUM CHLORIDE 1000 ML: 9 INJECTION, SOLUTION INTRAVENOUS at 09:28

## 2021-01-01 RX ADMIN — ARFORMOTEROL TARTRATE 15 MCG: 15 SOLUTION RESPIRATORY (INHALATION) at 08:47

## 2021-01-01 RX ADMIN — Medication 10 ML: at 05:34

## 2021-01-01 RX ADMIN — SODIUM CHLORIDE, POTASSIUM CHLORIDE, SODIUM LACTATE AND CALCIUM CHLORIDE 1000 ML: 600; 310; 30; 20 INJECTION, SOLUTION INTRAVENOUS at 10:59

## 2021-01-01 RX ADMIN — ARFORMOTEROL TARTRATE 15 MCG: 15 SOLUTION RESPIRATORY (INHALATION) at 19:14

## 2021-01-01 RX ADMIN — METHYLPREDNISOLONE SODIUM SUCCINATE 80 MG: 40 INJECTION, POWDER, FOR SOLUTION INTRAMUSCULAR; INTRAVENOUS at 05:14

## 2021-01-01 RX ADMIN — ARFORMOTEROL TARTRATE 15 MCG: 15 SOLUTION RESPIRATORY (INHALATION) at 08:24

## 2021-01-01 RX ADMIN — APIXABAN 10 MG: 5 TABLET, FILM COATED ORAL at 08:44

## 2021-01-01 RX ADMIN — Medication 10 ML: at 13:39

## 2021-01-01 RX ADMIN — ARFORMOTEROL TARTRATE 15 MCG: 15 SOLUTION RESPIRATORY (INHALATION) at 07:37

## 2021-01-01 RX ADMIN — HEPARIN SODIUM 3010 UNITS: 1000 INJECTION INTRAVENOUS; SUBCUTANEOUS at 10:42

## 2021-01-01 RX ADMIN — HYDROCHLOROTHIAZIDE 12.5 MG: 25 TABLET ORAL at 09:01

## 2021-01-01 RX ADMIN — ARFORMOTEROL TARTRATE 15 MCG: 15 SOLUTION RESPIRATORY (INHALATION) at 11:16

## 2021-01-01 RX ADMIN — HEPARIN SODIUM 6020 UNITS: 1000 INJECTION INTRAVENOUS; SUBCUTANEOUS at 17:50

## 2021-01-01 RX ADMIN — METHYLPREDNISOLONE SODIUM SUCCINATE 125 MG: 125 INJECTION, POWDER, FOR SOLUTION INTRAMUSCULAR; INTRAVENOUS at 03:23

## 2021-01-01 RX ADMIN — Medication 10 ML: at 14:52

## 2021-01-01 RX ADMIN — Medication 10 ML: at 14:26

## 2021-01-01 RX ADMIN — Medication 10 ML: at 06:40

## 2021-01-01 RX ADMIN — ARFORMOTEROL TARTRATE 15 MCG: 15 SOLUTION RESPIRATORY (INHALATION) at 07:55

## 2021-01-01 RX ADMIN — IOPAMIDOL 60 ML: 755 INJECTION, SOLUTION INTRAVENOUS at 05:26

## 2021-01-01 RX ADMIN — HEPARIN SODIUM 17 UNITS/KG/HR: 10000 INJECTION, SOLUTION INTRAVENOUS at 10:41

## 2021-01-01 RX ADMIN — IPRATROPIUM BROMIDE 0.5 MG: 0.5 SOLUTION RESPIRATORY (INHALATION) at 20:34

## 2021-01-01 RX ADMIN — ARFORMOTEROL TARTRATE 15 MCG: 15 SOLUTION RESPIRATORY (INHALATION) at 20:33

## 2021-01-01 RX ADMIN — METHYLPREDNISOLONE SODIUM SUCCINATE 80 MG: 40 INJECTION, POWDER, FOR SOLUTION INTRAMUSCULAR; INTRAVENOUS at 21:08

## 2021-01-01 RX ADMIN — METHYLPREDNISOLONE SODIUM SUCCINATE 80 MG: 40 INJECTION, POWDER, FOR SOLUTION INTRAMUSCULAR; INTRAVENOUS at 21:57

## 2021-01-01 RX ADMIN — ARFORMOTEROL TARTRATE 15 MCG: 15 SOLUTION RESPIRATORY (INHALATION) at 09:03

## 2021-01-01 RX ADMIN — IPRATROPIUM BROMIDE AND ALBUTEROL SULFATE 3 ML: .5; 3 SOLUTION RESPIRATORY (INHALATION) at 20:54

## 2021-01-01 RX ADMIN — METHYLPREDNISOLONE SODIUM SUCCINATE 80 MG: 40 INJECTION, POWDER, FOR SOLUTION INTRAMUSCULAR; INTRAVENOUS at 14:52

## 2021-01-01 RX ADMIN — ARFORMOTEROL TARTRATE 15 MCG: 15 SOLUTION RESPIRATORY (INHALATION) at 07:20

## 2021-01-01 RX ADMIN — Medication 10 ML: at 05:41

## 2021-01-01 RX ADMIN — AMIODARONE HYDROCHLORIDE 1 MG/MIN: 50 INJECTION, SOLUTION INTRAVENOUS at 15:03

## 2021-01-01 RX ADMIN — AMIODARONE HYDROCHLORIDE 200 MG: 200 TABLET ORAL at 12:31

## 2021-01-01 RX ADMIN — Medication 10 ML: at 15:59

## 2021-01-01 RX ADMIN — BUDESONIDE 250 MCG: 0.25 INHALANT RESPIRATORY (INHALATION) at 08:07

## 2021-01-01 RX ADMIN — METHYLPREDNISOLONE SODIUM SUCCINATE 80 MG: 40 INJECTION, POWDER, FOR SOLUTION INTRAMUSCULAR; INTRAVENOUS at 14:51

## 2021-01-01 RX ADMIN — ARFORMOTEROL TARTRATE 15 MCG: 15 SOLUTION RESPIRATORY (INHALATION) at 21:01

## 2021-01-01 RX ADMIN — ARFORMOTEROL TARTRATE 15 MCG: 15 SOLUTION RESPIRATORY (INHALATION) at 08:22

## 2021-01-01 RX ADMIN — ALBUTEROL SULFATE 5 MG: 2.5 SOLUTION RESPIRATORY (INHALATION) at 03:27

## 2021-01-01 RX ADMIN — Medication 10 ML: at 22:00

## 2021-01-01 RX ADMIN — MUPIROCIN: 20 OINTMENT TOPICAL at 11:20

## 2021-01-01 RX ADMIN — ARFORMOTEROL TARTRATE 15 MCG: 15 SOLUTION RESPIRATORY (INHALATION) at 08:10

## 2021-01-01 RX ADMIN — BUDESONIDE 250 MCG: 0.25 INHALANT RESPIRATORY (INHALATION) at 07:21

## 2021-01-01 RX ADMIN — IPRATROPIUM BROMIDE 0.5 MG: 0.5 SOLUTION RESPIRATORY (INHALATION) at 08:24

## 2021-01-01 RX ADMIN — HYDROCHLOROTHIAZIDE 12.5 MG: 25 TABLET ORAL at 09:14

## 2021-01-01 RX ADMIN — METHYLPREDNISOLONE SODIUM SUCCINATE 40 MG: 40 INJECTION, POWDER, FOR SOLUTION INTRAMUSCULAR; INTRAVENOUS at 06:49

## 2021-01-01 RX ADMIN — Medication 10 ML: at 21:18

## 2021-01-01 RX ADMIN — ARFORMOTEROL TARTRATE 15 MCG: 15 SOLUTION RESPIRATORY (INHALATION) at 20:00

## 2021-01-01 RX ADMIN — ARFORMOTEROL TARTRATE 15 MCG: 15 SOLUTION RESPIRATORY (INHALATION) at 20:49

## 2021-01-01 RX ADMIN — HEPARIN SODIUM 16 UNITS/KG/HR: 10000 INJECTION, SOLUTION INTRAVENOUS at 14:49

## 2021-01-01 RX ADMIN — METHYLPREDNISOLONE SODIUM SUCCINATE 80 MG: 40 INJECTION, POWDER, FOR SOLUTION INTRAMUSCULAR; INTRAVENOUS at 14:25

## 2021-01-01 RX ADMIN — BUDESONIDE 250 MCG: 0.25 INHALANT RESPIRATORY (INHALATION) at 19:38

## 2021-01-01 RX ADMIN — IOPAMIDOL 80 ML: 755 INJECTION, SOLUTION INTRAVENOUS at 12:16

## 2021-01-01 RX ADMIN — BUDESONIDE 250 MCG: 0.25 INHALANT RESPIRATORY (INHALATION) at 08:47

## 2021-01-01 RX ADMIN — Medication 10 ML: at 06:19

## 2021-01-01 RX ADMIN — IPRATROPIUM BROMIDE AND ALBUTEROL SULFATE 3 ML: .5; 3 SOLUTION RESPIRATORY (INHALATION) at 08:38

## 2021-01-01 RX ADMIN — AZITHROMYCIN MONOHYDRATE 500 MG: 500 INJECTION, POWDER, LYOPHILIZED, FOR SOLUTION INTRAVENOUS at 05:34

## 2021-01-01 RX ADMIN — ENOXAPARIN SODIUM 40 MG: 40 INJECTION SUBCUTANEOUS at 08:21

## 2021-01-01 RX ADMIN — ENOXAPARIN SODIUM 40 MG: 40 INJECTION SUBCUTANEOUS at 08:31

## 2021-01-01 RX ADMIN — Medication 10 ML: at 05:35

## 2021-01-01 RX ADMIN — AMIODARONE HYDROCHLORIDE 0.5 MG/MIN: 50 INJECTION, SOLUTION INTRAVENOUS at 22:05

## 2021-01-01 RX ADMIN — IPRATROPIUM BROMIDE 0.5 MG: 0.5 SOLUTION RESPIRATORY (INHALATION) at 08:10

## 2021-01-01 RX ADMIN — IPRATROPIUM BROMIDE AND ALBUTEROL SULFATE 3 ML: .5; 3 SOLUTION RESPIRATORY (INHALATION) at 14:40

## 2021-01-01 RX ADMIN — IPRATROPIUM BROMIDE 0.5 MG: 0.5 SOLUTION RESPIRATORY (INHALATION) at 13:05

## 2021-01-01 RX ADMIN — CEFTRIAXONE SODIUM 2 G: 2 INJECTION, POWDER, FOR SOLUTION INTRAMUSCULAR; INTRAVENOUS at 05:23

## 2021-01-01 RX ADMIN — HYDROCHLOROTHIAZIDE 12.5 MG: 25 TABLET ORAL at 08:13

## 2021-01-01 RX ADMIN — ENOXAPARIN SODIUM 40 MG: 40 INJECTION SUBCUTANEOUS at 09:04

## 2021-01-01 RX ADMIN — ALBUTEROL SULFATE 5 MG: 2.5 SOLUTION RESPIRATORY (INHALATION) at 03:24

## 2021-01-01 RX ADMIN — IPRATROPIUM BROMIDE 0.5 MG: 0.5 SOLUTION RESPIRATORY (INHALATION) at 02:42

## 2021-01-01 RX ADMIN — BUDESONIDE 250 MCG: 0.25 INHALANT RESPIRATORY (INHALATION) at 08:22

## 2021-01-01 RX ADMIN — Medication 10 ML: at 22:20

## 2021-01-01 RX ADMIN — EPINEPHRINE 1 MG: 0.1 INJECTION, SOLUTION ENDOTRACHEAL; INTRACARDIAC; INTRAVENOUS at 11:42

## 2021-01-01 RX ADMIN — IPRATROPIUM BROMIDE 0.5 MG: 0.5 SOLUTION RESPIRATORY (INHALATION) at 14:39

## 2021-01-01 RX ADMIN — Medication 10 ML: at 07:00

## 2021-01-01 RX ADMIN — ARFORMOTEROL TARTRATE 15 MCG: 15 SOLUTION RESPIRATORY (INHALATION) at 19:38

## 2021-01-01 RX ADMIN — Medication 10 ML: at 21:38

## 2021-01-01 RX ADMIN — BUDESONIDE 250 MCG: 0.25 INHALANT RESPIRATORY (INHALATION) at 20:37

## 2021-01-01 RX ADMIN — HYDROCHLOROTHIAZIDE 12.5 MG: 25 TABLET ORAL at 08:31

## 2021-01-01 RX ADMIN — ARFORMOTEROL TARTRATE 15 MCG: 15 SOLUTION RESPIRATORY (INHALATION) at 19:20

## 2021-01-01 RX ADMIN — IPRATROPIUM BROMIDE AND ALBUTEROL SULFATE 3 ML: .5; 3 SOLUTION RESPIRATORY (INHALATION) at 08:22

## 2021-01-01 RX ADMIN — BUDESONIDE 250 MCG: 0.25 INHALANT RESPIRATORY (INHALATION) at 07:37

## 2021-01-01 RX ADMIN — AMIODARONE HYDROCHLORIDE 150 MG: 1.5 INJECTION, SOLUTION INTRAVENOUS at 23:27

## 2021-01-01 RX ADMIN — Medication 10 ML: at 05:44

## 2021-01-01 RX ADMIN — IPRATROPIUM BROMIDE AND ALBUTEROL SULFATE 3 ML: .5; 3 SOLUTION RESPIRATORY (INHALATION) at 13:34

## 2021-01-01 RX ADMIN — ENOXAPARIN SODIUM 40 MG: 40 INJECTION SUBCUTANEOUS at 09:14

## 2021-01-01 RX ADMIN — AMIODARONE HYDROCHLORIDE 0.5 MG/MIN: 50 INJECTION, SOLUTION INTRAVENOUS at 21:36

## 2021-01-01 RX ADMIN — METHYLPREDNISOLONE SODIUM SUCCINATE 80 MG: 40 INJECTION, POWDER, FOR SOLUTION INTRAMUSCULAR; INTRAVENOUS at 21:59

## 2021-01-01 RX ADMIN — BUDESONIDE 250 MCG: 0.25 INHALANT RESPIRATORY (INHALATION) at 21:20

## 2021-01-01 RX ADMIN — Medication 10 ML: at 22:02

## 2021-01-01 RX ADMIN — BUDESONIDE 250 MCG: 0.25 INHALANT RESPIRATORY (INHALATION) at 09:03

## 2021-01-01 RX ADMIN — ENOXAPARIN SODIUM 80 MG: 80 INJECTION SUBCUTANEOUS at 20:45

## 2021-01-01 RX ADMIN — Medication 10 ML: at 21:15

## 2021-01-01 RX ADMIN — BUDESONIDE 250 MCG: 0.25 INHALANT RESPIRATORY (INHALATION) at 20:50

## 2021-01-01 RX ADMIN — Medication 10 ML: at 14:00

## 2021-01-01 RX ADMIN — IPRATROPIUM BROMIDE AND ALBUTEROL SULFATE 3 ML: .5; 3 SOLUTION RESPIRATORY (INHALATION) at 07:29

## 2021-01-01 RX ADMIN — APIXABAN 10 MG: 5 TABLET, FILM COATED ORAL at 21:18

## 2021-01-01 RX ADMIN — BUDESONIDE 250 MCG: 0.25 INHALANT RESPIRATORY (INHALATION) at 11:16

## 2021-01-01 RX ADMIN — METHYLPREDNISOLONE SODIUM SUCCINATE 80 MG: 40 INJECTION, POWDER, FOR SOLUTION INTRAMUSCULAR; INTRAVENOUS at 05:44

## 2021-01-01 RX ADMIN — ACETAMINOPHEN 650 MG: 325 TABLET ORAL at 09:14

## 2021-01-01 RX ADMIN — Medication 10 ML: at 06:03

## 2021-01-01 RX ADMIN — ARFORMOTEROL TARTRATE 15 MCG: 15 SOLUTION RESPIRATORY (INHALATION) at 08:07

## 2021-01-01 RX ADMIN — METHYLPREDNISOLONE SODIUM SUCCINATE 80 MG: 40 INJECTION, POWDER, FOR SOLUTION INTRAMUSCULAR; INTRAVENOUS at 22:00

## 2021-01-01 RX ADMIN — IPRATROPIUM BROMIDE AND ALBUTEROL SULFATE 3 ML: .5; 3 SOLUTION RESPIRATORY (INHALATION) at 11:44

## 2021-01-01 RX ADMIN — Medication 10 ML: at 05:14

## 2021-01-01 RX ADMIN — METHYLPREDNISOLONE SODIUM SUCCINATE 125 MG: 125 INJECTION, POWDER, FOR SOLUTION INTRAMUSCULAR; INTRAVENOUS at 11:33

## 2021-01-01 RX ADMIN — BUDESONIDE 250 MCG: 0.25 INHALANT RESPIRATORY (INHALATION) at 19:14

## 2021-01-01 RX ADMIN — IPRATROPIUM BROMIDE AND ALBUTEROL SULFATE 3 ML: .5; 3 SOLUTION RESPIRATORY (INHALATION) at 07:50

## 2021-01-01 RX ADMIN — IPRATROPIUM BROMIDE AND ALBUTEROL SULFATE 3 ML: .5; 3 SOLUTION RESPIRATORY (INHALATION) at 19:25

## 2021-01-01 RX ADMIN — IPRATROPIUM BROMIDE AND ALBUTEROL SULFATE 3 ML: .5; 3 SOLUTION RESPIRATORY (INHALATION) at 14:46

## 2021-01-01 RX ADMIN — Medication 10 ML: at 21:32

## 2021-01-01 RX ADMIN — IPRATROPIUM BROMIDE AND ALBUTEROL SULFATE 3 ML: .5; 3 SOLUTION RESPIRATORY (INHALATION) at 14:17

## 2021-01-01 RX ADMIN — BUDESONIDE 250 MCG: 0.25 INHALANT RESPIRATORY (INHALATION) at 08:10

## 2021-01-01 RX ADMIN — IPRATROPIUM BROMIDE 0.5 MG: 0.5 SOLUTION RESPIRATORY (INHALATION) at 20:55

## 2021-01-01 RX ADMIN — Medication 10 ML: at 15:18

## 2021-01-01 RX ADMIN — IPRATROPIUM BROMIDE AND ALBUTEROL SULFATE 3 ML: .5; 3 SOLUTION RESPIRATORY (INHALATION) at 03:24

## 2021-01-01 RX ADMIN — CEFTRIAXONE SODIUM 2 G: 2 INJECTION, POWDER, FOR SOLUTION INTRAMUSCULAR; INTRAVENOUS at 04:23

## 2021-01-01 RX ADMIN — IPRATROPIUM BROMIDE AND ALBUTEROL SULFATE 3 ML: .5; 3 SOLUTION RESPIRATORY (INHALATION) at 07:17

## 2021-01-01 RX ADMIN — BUDESONIDE 250 MCG: 0.25 INHALANT RESPIRATORY (INHALATION) at 08:24

## 2021-01-01 RX ADMIN — CEFTRIAXONE SODIUM 2 G: 2 INJECTION, POWDER, FOR SOLUTION INTRAMUSCULAR; INTRAVENOUS at 06:02

## 2021-01-01 RX ADMIN — HEPARIN SODIUM 17 UNITS/KG/HR: 10000 INJECTION, SOLUTION INTRAVENOUS at 12:17

## 2021-01-01 RX ADMIN — METHYLPREDNISOLONE SODIUM SUCCINATE 80 MG: 40 INJECTION, POWDER, FOR SOLUTION INTRAMUSCULAR; INTRAVENOUS at 06:36

## 2021-01-01 RX ADMIN — SODIUM CHLORIDE 500 ML: 9 INJECTION, SOLUTION INTRAVENOUS at 14:52

## 2021-01-01 RX ADMIN — ENOXAPARIN SODIUM 80 MG: 80 INJECTION SUBCUTANEOUS at 09:10

## 2021-01-01 RX ADMIN — Medication 10 ML: at 09:00

## 2021-01-01 RX ADMIN — HYDROCHLOROTHIAZIDE 12.5 MG: 25 TABLET ORAL at 08:20

## 2021-01-01 RX ADMIN — BUDESONIDE 250 MCG: 0.25 INHALANT RESPIRATORY (INHALATION) at 07:55

## 2021-01-01 RX ADMIN — IPRATROPIUM BROMIDE AND ALBUTEROL SULFATE 3 ML: .5; 3 SOLUTION RESPIRATORY (INHALATION) at 02:28

## 2021-01-01 RX ADMIN — HEPARIN SODIUM 18 UNITS/KG/HR: 10000 INJECTION, SOLUTION INTRAVENOUS at 17:51

## 2021-01-01 RX ADMIN — AZITHROMYCIN MONOHYDRATE 500 MG: 500 INJECTION, POWDER, LYOPHILIZED, FOR SOLUTION INTRAVENOUS at 04:50

## 2021-01-01 RX ADMIN — ARFORMOTEROL TARTRATE 15 MCG: 15 SOLUTION RESPIRATORY (INHALATION) at 21:20

## 2021-01-01 RX ADMIN — IPRATROPIUM BROMIDE AND ALBUTEROL SULFATE 3 ML: .5; 3 SOLUTION RESPIRATORY (INHALATION) at 08:08

## 2021-01-01 RX ADMIN — CEFTRIAXONE SODIUM 2 G: 2 INJECTION, POWDER, FOR SOLUTION INTRAMUSCULAR; INTRAVENOUS at 06:17

## 2021-01-01 RX ADMIN — IPRATROPIUM BROMIDE 0.5 MG: 0.5 SOLUTION RESPIRATORY (INHALATION) at 02:30

## 2021-01-01 RX ADMIN — METHYLPREDNISOLONE SODIUM SUCCINATE 80 MG: 40 INJECTION, POWDER, FOR SOLUTION INTRAMUSCULAR; INTRAVENOUS at 05:36

## 2021-01-01 RX ADMIN — IPRATROPIUM BROMIDE 0.5 MG: 0.5 SOLUTION RESPIRATORY (INHALATION) at 21:20

## 2021-01-01 RX ADMIN — TETANUS TOXOID, REDUCED DIPHTHERIA TOXOID AND ACELLULAR PERTUSSIS VACCINE, ADSORBED 0.5 ML: 5; 2.5; 8; 8; 2.5 SUSPENSION INTRAMUSCULAR at 09:55

## 2021-01-01 RX ADMIN — EPINEPHRINE 1 MG: 0.1 INJECTION, SOLUTION ENDOTRACHEAL; INTRACARDIAC; INTRAVENOUS at 11:48

## 2021-01-01 RX ADMIN — ENOXAPARIN SODIUM 40 MG: 40 INJECTION SUBCUTANEOUS at 08:13

## 2021-01-01 RX ADMIN — BUDESONIDE 250 MCG: 0.25 INHALANT RESPIRATORY (INHALATION) at 21:01

## 2021-01-01 RX ADMIN — IPRATROPIUM BROMIDE AND ALBUTEROL SULFATE 3 ML: .5; 3 SOLUTION RESPIRATORY (INHALATION) at 19:38

## 2021-01-01 RX ADMIN — CEFTRIAXONE SODIUM 2 G: 2 INJECTION, POWDER, FOR SOLUTION INTRAMUSCULAR; INTRAVENOUS at 04:39

## 2021-01-01 RX ADMIN — IPRATROPIUM BROMIDE AND ALBUTEROL SULFATE 3 ML: .5; 3 SOLUTION RESPIRATORY (INHALATION) at 19:59

## 2021-01-01 RX ADMIN — AMIODARONE HYDROCHLORIDE 400 MG: 200 TABLET ORAL at 08:44

## 2021-01-01 RX ADMIN — Medication 10 ML: at 22:05

## 2021-02-23 NOTE — ED PROVIDER NOTES
EMERGENCY DEPARTMENT HISTORY AND PHYSICAL EXAM 
 
 
Date: 2/23/2021 Patient Name: Romy Cordoba History of Presenting Illness Chief Complaint Patient presents with  Fall Lac to nose following GLF where pt tripped over . Pt denies Blood thinner usage. Denies LOC. Denies other injuries. History Provided By: Patient HPI: Romy Cordoba, 65 y.o. 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 The lawnmower was not on at the time. Patient is in triage, holding a towel to his nose, says he can breathe through his nose to some degree. Denies loss of consciousness, headache and says he is not on any blood thinners. He denies any neck pain, arm pain, hip pain knee pain or pain anywhere else. He has not had a previous nose injury like this before. There are no other complaints, changes, or physical findings at this time. PCP: Siddhartha Jauregui, DO No current facility-administered medications on file prior to encounter. Current Outpatient Medications on File Prior to Encounter Medication Sig Dispense Refill  hydroCHLOROthiazide (HYDRODIURIL) 25 mg tablet Take 0.5 Tabs by mouth daily. 90 Tab 3  
 fluticasone-umeclidinium-vilanterol (TRELEGY ELLIPTA) 100-62.5-25 mcg inhaler Take 1 Puff by inhalation daily. 2 Inhaler 0  
 albuterol (PROVENTIL HFA, VENTOLIN HFA, PROAIR HFA) 90 mcg/actuation inhaler Take 2 Puffs by inhalation every six (6) hours as needed for Wheezing. 3 Inhaler 3 Past History Past Medical History: 
Past Medical History:  
Diagnosis Date  Chronic obstructive pulmonary disease ([address]  Diverticulitis  Hypertension  Ill-defined condition   
 kidney stones/ stant placed Past Surgical History: 
Past Surgical History:  
Procedure Laterality Date  COLONOSCOPY N/A 11/30/2016 COLONOSCOPY performed by Rodrigo Alonso MD at [facility] ENDOSCOPY  COLONOSCOPY,REMV GLORIAFORCEP/CAUTERY  11/30/2016  HX BACK SURGERY    
 plate to upper neck  HX CERVICAL FUSION    
 HX ORTHOPAEDIC    
 left ring finger-trauma  HX ORTHOPAEDIC    
 right index finger  HX ORTHOPAEDIC  12/2/14 Bilateral total knee replacements  HX TONSILLECTOMY  HX UROLOGICAL    
 kidney stone extraction/stent Family History: 
Family History Problem Relation Age of Onset  No Known Problems Mother  Emphysema Father Social History: 
Social History Tobacco Use  Smoking status: Current Every Day Smoker Packs/day: 1.50 Years: 50.00 Pack years: 75.00 Types: Cigarettes  Smokeless tobacco: Never Used  Tobacco comment: electronic cigarettes in the past  
Substance Use Topics  Alcohol use: Yes Alcohol/week: 3.0 standard drinks Types: 3 Cans of beer per week Comment: social -3 drinks weekly--rum  Drug use: No  
 
 
Allergies: 
No Known Allergies Review of Systems Review of Systems Constitutional: Negative. HENT: Negative. Eyes: Negative for pain and visual disturbance. Respiratory: Negative. Cardiovascular: Negative. Musculoskeletal: Negative for back pain and neck pain. Neurological: Negative. All other systems reviewed and are negative. Physical Exam  
Physical Exam  
Vital signs and nursing notes reviewed CONSTITUTIONAL: Alert, in mild distress; well-developed; well-nourished. HEAD:  Normocephalic, atraumatic EYES: PERRL; EOM's intact. ENTM: Nose: Large, full-thickness defect along the right nare and extending up the septum. It extends down to the cartilage.; Throat: no erythema or exudate, mucous membranes moist 
Neck:  Supple. trachea is midline. No midline C-spine tenderness on direct palpation. RESP: Chest clear, equal breath sounds. - W/R/R 
CV: S1 and S2 WNL; No murmurs, gallops or rubs. 2+ radial and DP pulses bilaterally. GI: non-distended, normal bowel sounds, abdomen soft and non-tender. Pelvis is stable nontender. : No costo-vertebral angle tenderness. BACK:  Non-tender, normal appearance UPPER EXT:  Normal inspection. no joint or soft tissue swelling LOWER EXT: No edema, no calf tenderness. No shortening or rotation of the legs. NEURO: Alert and oriented x3, 5/5 strength and light touch sensation intact in bilateral upper and lower extremities. SKIN: No rashes; Warm and dry PSYCH: Normal mood, normal affect Diagnostic Study Results Labs - No results found for this or any previous visit (from the past 12 hour(s)). Radiologic Studies - No orders to display CT Results  (Last 48 hours) None CXR Results  (Last 48 hours) None Medical Decision Making I am the first provider for this patient. I reviewed the vital signs, available nursing notes, past medical history, past surgical history, family history and social history. Vital Signs-Reviewed the patient's vital signs. Patient Vitals for the past 12 hrs: 
 Temp Pulse Resp BP SpO2  
02/23/21 0939 97.7 °F (36.5 °C) 78 18 (!) 182/102 92 % Records Reviewed: Nursing Notes Provider Notes (Medical Decision Making):  
68-year-old male here with large nasal laceration down to cartilage. Case discussed with on-call plastic surgeon who will see patient in the office. Patient to get tetanus update here prior to going over to plastic surgeons office for primary closure. ED Course:  
Initial assessment performed. The patients presenting problems have been discussed, and they are in agreement with the care plan formulated and outlined with them. I have encouraged them to ask questions as they arise throughout their visit. Disposition: 
Discharge Patient will proceed over to Dr. Bruna Tyler office for a 1 PM appointment for a primary closure of his nose wound.  
 
Discharge Note: 
11:30am 
 The pt is ready for discharge. The pt's signs, symptoms, diagnosis, and discharge instructions have been discussed and pt has conveyed their understanding. The pt is to follow up as recommended or return to ER should their symptoms worsen. Plan has been discussed and pt is in agreement. 
 
 
 
DISCHARGE PLAN: 
1.  
Discharge Medication List as of 2/23/2021 11:23 AM  
  
 
2.  
Follow-up Information   
 Follow up With Specialties Details Why Contact Info  
 Joshua Amos MD Plastic Surgery Go to  PLASTIC SURGERY: [place] [facility] [place]'s office by 1:00pm today [address] [place] [address] [address] [place] [address] [facility] 
[facility] [address] [phone] 
  
  
 
3.  Return to ED if worse  
 
Diagnosis  
 
Clinical Impression:  
1. Laceration of nasal septum with complication   
2. Need for prophylactic vaccination against diphtheria and tetanus   
 
 
Attestations: 
 
Veena Mittal MD 
 
Please note that this dictation was completed with [employer], the computer voice recognition software.  Quite often unanticipated grammatical, syntax, homophones, and other interpretive errors are inadvertently transcribed by the computer software.  Please disregard these errors.  Please excuse any errors that have escaped final proofreading.  Thank you.

## 2021-02-23 NOTE — ED NOTES
Sharee WILKINS reviewed discharge instructions with the patient. The patient and spouse verbalized understanding. Alert and stable for discharge.

## 2021-04-14 PROBLEM — R91.8 LUNG MASS: Status: ACTIVE | Noted: 2021-01-01

## 2021-04-14 PROBLEM — J96.01 ACUTE RESPIRATORY FAILURE WITH HYPOXIA (HCC): Status: ACTIVE | Noted: 2021-01-01

## 2021-04-14 NOTE — ED NOTES
Spoke with Sandra Case on PTS floor and she stated that cleaning was in room and room would be available to receive PT in abut 20 mins

## 2021-04-14 NOTE — ED NOTES
Dietary notified that PT did not receive morning meal tray. Dietary stated they would get a meal tray to PT.

## 2021-04-14 NOTE — ED NOTES
Dr. Sanket Donohue verbally stated that the PT could be transported to room without nurse or monitor

## 2021-04-14 NOTE — ED PROVIDER NOTES
EMERGENCY DEPARTMENT HISTORY AND PHYSICAL EXAM 
 
 
Date: 4/14/2021 Patient Name: Subhash Claros History of Presenting Illness Chief Complaint Patient presents with  Wrist Pain Patient reports R wrist pain denies any injury. History Provided By: Patient HPI: Subhash Claros, 65 y.o. male with PMHx significant for COPD, hypertension, diverticulitis, smoking addiction presents to the ED with chief complaint of right wrist pain that started yesterday and worsened throughout the day today. Patient reports he woke up with that in the morning and then went to work as a  at [employer]. He came home about 3:30 PM and took Tylenol and did not have any relief of his symptoms. Reports pain is worse with any movement and is severe. He also noticed that his right wrist is swollen. He does not have any known history of gout. He cannot remember any injury. He denies any fevers or chills. Patient noted to be hypoxic on arrival with sats in the high 70s. Denies any chest pain. Denies nausea, vomiting, diarrhea. States she still smokes 1-1/2 packs/day and has smoked for 60 years. Reports shortness of breath with exertion, but not at rest PCP: Yousif Barrett, DO No current facility-administered medications on file prior to encounter. Current Outpatient Medications on File Prior to Encounter Medication Sig Dispense Refill  hydroCHLOROthiazide (HYDRODIURIL) 25 mg tablet Take 0.5 Tabs by mouth daily. 90 Tab 3  
 fluticasone-umeclidinium-vilanterol (TRELEGY ELLIPTA) 100-62.5-25 mcg inhaler Take 1 Puff by inhalation daily. 2 Inhaler 0  
 albuterol (PROVENTIL HFA, VENTOLIN HFA, PROAIR HFA) 90 mcg/actuation inhaler Take 2 Puffs by inhalation every six (6) hours as needed for Wheezing. 3 Inhaler 3 Past History Past Medical History: 
Past Medical History:  
Diagnosis Date  Chronic obstructive pulmonary disease ([address]  Diverticulitis  Hypertension  Ill-defined condition   
 kidney stones/ stant placed Past Surgical History: 
Past Surgical History:  
Procedure Laterality Date  COLONOSCOPY N/A 11/30/2016 COLONOSCOPY performed by Jairo Lopes MD at [facility] ENDOSCOPY  COLONOSCOPY,KADI DUARTE/CAUTERY  11/30/2016  HX BACK SURGERY    
 plate to upper neck  HX CERVICAL FUSION    
 HX ORTHOPAEDIC    
 left ring finger-trauma  HX ORTHOPAEDIC    
 right index finger  HX ORTHOPAEDIC  12/2/14 Bilateral total knee replacements  HX TONSILLECTOMY  HX UROLOGICAL    
 kidney stone extraction/stent Family History: 
Family History Problem Relation Age of Onset  No Known Problems Mother  Emphysema Father Social History: 
Social History Tobacco Use  Smoking status: Current Every Day Smoker Packs/day: 1.50 Years: 50.00 Pack years: 75.00 Types: Cigarettes  Smokeless tobacco: Never Used  Tobacco comment: electronic cigarettes in the past  
Substance Use Topics  Alcohol use: Yes Alcohol/week: 3.0 standard drinks Types: 3 Cans of beer per week Comment: social -3 drinks weekly--rum  Drug use: No  
 
 
Allergies: 
No Known Allergies Review of Systems Review of Systems Constitutional: Negative for chills and fever. HENT: Negative for congestion, ear pain, rhinorrhea and sore throat. Respiratory: Positive for shortness of breath. Negative for wheezing. Cardiovascular: Negative for chest pain, palpitations and leg swelling. Gastrointestinal: Negative for abdominal pain, diarrhea, nausea and vomiting. Genitourinary: Negative for dysuria, flank pain and hematuria. Musculoskeletal: Negative for back pain and myalgias. Skin: Negative for rash and wound. Neurological: Negative for dizziness, syncope, light-headedness and headaches. Psychiatric/Behavioral: Negative for confusion. The patient is nervous/anxious.    
All other systems reviewed and are negative. Physical Exam  
 General appearance - well nourished, well appearing, and in no distress Eyes - pupils equal and reactive, extraocular eye movements intact ENT - mucous membranes moist, pharynx normal without lesions Neck - supple, no significant adenopathy; non-tender to palpation Chest -tachypneic decreased breath sounds bilateral bases, increased work of breathing i; non-tender to palpation Heart - normal rate and regular rhythm, S1 and S2 normal, no murmurs noted Abdomen - soft, nontender, nondistended, no masses or organomegaly Musculoskeletal -tender to palpation right radial wrist with mild swelling, no deformity; normal ROM Extremities - peripheral pulses normal, 1+ bilateral pedal edema Skin - normal coloration and turgor, no rashes Neurological - alert, oriented x3, normal speech, no focal findings or movement disorder noted Diagnostic Study Results Labs - Recent Results (from the past 12 hour(s)) EKG, 12 LEAD, INITIAL Collection Time: 04/14/21  2:56 AM  
Result Value Ref Range Ventricular Rate 83 BPM  
 Atrial Rate 83 BPM  
 P-R Interval 168 ms QRS Duration 96 ms  
 Q-T Interval 370 ms QTC Calculation (Bezet) 434 ms Calculated P Axis 59 degrees Calculated R Axis -108 degrees Calculated T Axis 56 degrees Diagnosis Normal sinus rhythm with sinus arrhythmia Right superior axis deviation Incomplete right bundle branch block Right ventricular hypertrophy When compared with ECG of 09-NOV-2020 12:20, No significant change was found CBC WITH AUTOMATED DIFF Collection Time: 04/14/21  3:13 AM  
Result Value Ref Range WBC 7.8 4.1 - 11.1 K/uL  
 RBC 4.84 4.10 - 5.70 M/uL  
 HGB 15.8 12.1 - 17.0 g/dL HCT 49.8 36.6 - 50.3 % .9 (H) 80.0 - 99.0 FL  
 MCH 32.6 26.0 - 34.0 PG  
 MCHC 31.7 30.0 - 36.5 g/dL  
 RDW 15.6 (H) 11.5 - 14.5 % PLATELET 990 614 - 719 K/uL MPV 10.6 8.9 - 12.9 FL  
 NRBC 0.0 0  WBC ABSOLUTE NRBC 0.00 0.00 - 0.01 K/uL NEUTROPHILS 85 (H) 32 - 75 % LYMPHOCYTES 6 (L) 12 - 49 % MONOCYTES 8 5 - 13 % EOSINOPHILS 1 0 - 7 % BASOPHILS 0 0 - 1 % IMMATURE GRANULOCYTES 0 0.0 - 0.5 % ABS. NEUTROPHILS 6.6 1.8 - 8.0 K/UL  
 ABS. LYMPHOCYTES 0.5 (L) 0.8 - 3.5 K/UL  
 ABS. MONOCYTES 0.6 0.0 - 1.0 K/UL  
 ABS. EOSINOPHILS 0.1 0.0 - 0.4 K/UL  
 ABS. BASOPHILS 0.0 0.0 - 0.1 K/UL  
 ABS. IMM. GRANS. 0.0 0.00 - 0.04 K/UL  
 DF SMEAR SCANNED    
 RBC COMMENTS NORMOCYTIC, NORMOCHROMIC METABOLIC PANEL, COMPREHENSIVE Collection Time: 04/14/21  3:13 AM  
Result Value Ref Range Sodium 143 136 - 145 mmol/L Potassium 3.5 3.5 - 5.1 mmol/L Chloride 107 97 - 108 mmol/L  
 CO2 33 (H) 21 - 32 mmol/L Anion gap 3 (L) 5 - 15 mmol/L Glucose 121 (H) 65 - 100 mg/dL BUN 19 6 - 20 MG/DL Creatinine 1.07 0.70 - 1.30 MG/DL  
 BUN/Creatinine ratio 18 12 - 20 GFR est AA >60 >60 ml/min/1.73m2 GFR est non-AA >60 >60 ml/min/1.73m2 Calcium 8.4 (L) 8.5 - 10.1 MG/DL Bilirubin, total 0.6 0.2 - 1.0 MG/DL  
 ALT (SGPT) 27 12 - 78 U/L  
 AST (SGOT) 23 15 - 37 U/L Alk. phosphatase 90 45 - 117 U/L Protein, total 6.2 (L) 6.4 - 8.2 g/dL Albumin 3.2 (L) 3.5 - 5.0 g/dL Globulin 3.0 2.0 - 4.0 g/dL A-G Ratio 1.1 1.1 - 2.2 SAMPLES BEING HELD Collection Time: 04/14/21  3:13 AM  
Result Value Ref Range SAMPLES BEING HELD RD SST   
 COMMENT Add-on orders for these samples will be processed based on acceptable specimen integrity and analyte stability, which may vary by analyte. CK W/ CKMB & INDEX Collection Time: 04/14/21  3:13 AM  
Result Value Ref Range CK - MB 2.2 <3.6 NG/ML  
 CK-MB Index 2.5 0.0 - 2.5 CK 89 39 - 308 U/L  
TROPONIN I Collection Time: 04/14/21  3:13 AM  
Result Value Ref Range Troponin-I, Qt. <0.05 <0.05 ng/mL NT-PRO BNP Collection Time: 04/14/21  3:13 AM  
Result Value Ref Range  NT pro-BNP 1,367 (H) <125 PG/ML  
D DIMER  
 Collection Time: 04/14/21  3:13 AM  
Result Value Ref Range D-dimer 1.29 (H) 0.00 - 0.65 mg/L FEU  
POC EG7 Collection Time: 04/14/21  3:36 AM  
Result Value Ref Range Calcium, ionized (POC) 1.25 1.12 - 1.32 mmol/L  
 pH (POC) 7.31 (L) 7.35 - 7.45    
 pCO2 (POC) 67.9 (H) 35.0 - 45.0 MMHG  
 pO2 (POC) 63 (L) 80 - 100 MMHG  
 HCO3 (POC) 34.5 (H) 22 - 26 MMOL/L Base excess (POC) 8 mmol/L  
 sO2 (POC) 88 (L) 92 - 97 % Site LEFT BRACHIAL Device: NASAL CANNULA Flow rate (POC) 4 L/M Allens test (POC) N/A Specimen type (POC) ARTERIAL Total resp. rate 26 Radiologic Studies -  
XR WRIST RT AP/LAT/OBL MIN 3V Final Result No acute abnormality. XR CHEST PORT Final Result Mild airspace disease right upper lobe. CTA CHEST W OR W WO CONT    (Results Pending) CT Results  (Last 48 hours) None CXR Results  (Last 48 hours) 04/14/21 0310  XR CHEST PORT Final result Impression:  Mild airspace disease right upper lobe. Narrative:  EXAM: XR CHEST PORT INDICATION: Chest pain COMPARISON: November 9 FINDINGS: A portable AP radiograph of the chest was obtained at 0259 hours. The  
patient is on a cardiac monitor. There is mild focal airspace disease in the  
right upper lobe. There is atherosclerosis of the aorta. There is evidence of  
prior cervical fusion. Medical Decision Making I am the first provider for this patient. I reviewed the vital signs, available nursing notes, past medical history, past surgical history, family history and social history. Vital Signs-Reviewed the patient's vital signs. Patient Vitals for the past 12 hrs: 
 Temp Pulse Resp BP SpO2  
04/14/21 0330  76 23 (!) 141/78 94 % 04/14/21 0327  75     
04/14/21 0312     91 % 04/14/21 0308     91 % 04/14/21 0242 98 °F (36.7 °C) 95 20 (!) 162/87 (!) 78 % EK at 2:56 AM on April 14, 2021 interpreted by me: Normal sinus rhythm with sinus arrhythmia, 83 3 bpm, right axis deviation, incomplete right bundle branch block, normal DE and QTc intervals, normal QRS interval, nonspecific ST changes clarifying Records Reviewed: Nursing Notes and Old Medical Records Provider Notes (Medical Decision Making):  
Differential diagnosis: Gout, fracture, sprain, contusion, pneumonia, bronchitis, COPD exacerbation, PE We will check CBC, CMP, ABG, CPK, troponin, proBNP, D-dimer, chest x-ray, wrist x-ray, uric acid ED Course:  
Initial assessment performed. The patients presenting problems have been discussed, and they are in agreement with the care plan formulated and outlined with them. I have encouraged them to ask questions as they arise throughout their visit. Progress Notes: 
ED Course as of Apr 14 0612 Wed Apr 14, 2021  
0609 CT is negative for PE or infiltrate, but does show multiple lung masses consistent with malignancy. Patient is still only 90% on 4 L nasal cannula after multiple nebs and steroids. Case discussed with  (hospitalist) who will see and admit the patient. CT findings discussed with patient.  
 [AO] ED Course User Index [AO] Evelina Santamaria MD  
 
 
Disposition: 
Admit to hospitalist 
 
CRITICAL CARE NOTE : 
 
 
 
IMPENDING DETERIORATION -Airway and Respiratory ASSOCIATED RISK FACTORS - Hypoxia MANAGEMENT- Bedside Assessment and Supervision of Care INTERPRETATION -  Xrays, CT Scan, Blood Gases, ECG and Blood Pressure INTERVENTIONS - Metobolic interventions and nebs, oxygen CASE REVIEW - Hospitalist/Intensivist and Nursing TREATMENT RESPONSE -Improved PERFORMED BY - Self NOTES   : 
 
 
I have spent 65 minutes of critical care time involved in lab review, consultations with specialist, family decision- making, bedside attention and documentation. During this entire length of time I was immediately available to the patient . Harley Gillette MD 
 
 
 Diagnosis Clinical Impression: 1. Acute respiratory failure with hypoxia ([facility] [address] 2. Acute exacerbation of chronic obstructive pulmonary disease (COPD) ([facility] [address] 3. Lung mass 4.  Acute pain of right wrist

## 2021-04-14 NOTE — PROGRESS NOTES
TRANSFER - IN REPORT: 
 
Verbal report received from Hunter Alexander RN(name) on Meir Miguel  being received from ED(unit) for routine progression of care Report consisted of patients Situation, Background, Assessment and  
Recommendations(SBAR). Information from the following report(s) SBAR and Kardex was reviewed with the receiving nurse. Opportunity for questions and clarification was provided. Assessment completed upon patients arrival to unit and care assumed. * Report to be given to [facility]; pt to go to [facility] after Engineering finishes repairing the RM.

## 2021-04-14 NOTE — CONSULTS
Pulmonary, Critical Care, and Sleep Medicine Initial Patient Consult Name: Cindy Varner MRN: [id number] : 1950 Hospital: [facility] Date: 2021 IMPRESSION:  
· Acute/chronic hypoxic/hypercapnic respiratory failure · Severe COPD - FEV1 0.81 L (25% predicted) 2020, has not seen pulmonary - likely with exacerbation · 3 concerning pulmonary nodules, high risk for malignancy · Wrist pain · Tobacco abuse RECOMMENDATIONS:  
· O2 
· Bronchodilators · Systemic corticosteroids · Empiric antibiotics · Tobacco cessation counseling · Ortho seeing for wrist pain · Will need PET-CT for evaluation of nodules. This can not be done as inpatient. These nodules are not amenable to biopsy, and he is not a surgical candidate. Might be a candidate for SBRT if malignant, but will be challenging with more than one lesion Subjective: This patient has been seen and evaluated at the request of Dr. Colin Herman for lung masses. Patient is a 65 y.o. male smoker with COPD, presented to the ER today with complaints of wrist pain. However, he was found to be hypoxic into the 70s. A CT was then done which revealed 3 new pulmonary nodules. Patient reports that he has severe dyspnea at baseline having to stop and rest after walking to the mailbox. He is on Trelegy prescribed by his PCP, which helped his dyspnea some though he remains severely symptomatic. No f/c/ns/wt loss. He reports that was recommended to see a pulmonary specialist, but he did not want to do that. Reports that his SpO2 is typically around 90% on room air. Past Medical History:  
Diagnosis Date  Chronic obstructive pulmonary disease ([address]  Diverticulitis  Hypertension  Ill-defined condition   
 kidney stones/ stant placed Past Surgical History:  
Procedure Laterality Date  COLONOSCOPY N/A 2016 COLONOSCOPY performed by Nimesh Jimenez MD at [facility] ENDOSCOPY  COLONOSCOPY,REMV GLORIA,FORCEP/CAUTERY  11/30/2016  HX BACK SURGERY    
 plate to upper neck  HX CERVICAL FUSION    
 HX ORTHOPAEDIC    
 left ring finger-trauma  HX ORTHOPAEDIC    
 right index finger  HX ORTHOPAEDIC  12/2/14 Bilateral total knee replacements  HX TONSILLECTOMY  HX UROLOGICAL    
 kidney stone extraction/stent Prior to Admission medications Medication Sig Start Date End Date Taking? Authorizing Provider  
hydroCHLOROthiazide (HYDRODIURIL) 25 mg tablet Take 0.5 Tabs by mouth daily. 1/30/20   Germán Zhu III, DO  
fluticasone-umeclidinium-vilanterol (TRELEGY ELLIPTA) 100-62.5-25 mcg inhaler Take 1 Puff by inhalation daily. 1/30/20   Amrik VERA III, DO  
albuterol (PROVENTIL HFA, VENTOLIN HFA, PROAIR HFA) 90 mcg/actuation inhaler Take 2 Puffs by inhalation every six (6) hours as needed for Wheezing. 10/29/19   Amrik Wilkinson III, DO No Known Allergies Social History Tobacco Use  Smoking status: Current Every Day Smoker Packs/day: 1.50 Years: 50.00 Pack years: 75.00 Types: Cigarettes  Smokeless tobacco: Never Used  Tobacco comment: electronic cigarettes in the past  
Substance Use Topics  Alcohol use: Yes Alcohol/week: 3.0 standard drinks Types: 3 Cans of beer per week Comment: social -3 drinks weekly--rum Family History Problem Relation Age of Onset  No Known Problems Mother  Emphysema Father Current Facility-Administered Medications Medication Dose Route Frequency  methylPREDNISolone (PF) (Solu-MEDROL) 125 mg/2 mL injection  albuterol (PROVENTIL VENTOLIN) 2.5 mg /3 mL (0.083 %) nebulizer solution  albuterol-ipratropium (DUO-NEB) 2.5 mg-0.5 mg/3 ml nebulizer solution  albuterol (PROVENTIL VENTOLIN) 2.5 mg /3 mL (0.083 %) nebulizer solution  cefTRIAXone (ROCEPHIN) 2 g in 0.9% sodium chloride (MBP/ADV) 50 mL MBP  2 g IntraVENous Q24H  azithromycin (ZITHROMAX) 500 mg in 0.9% sodium chloride 250 mL (VIAL-MATE)  500 mg IntraVENous Q24H  
 albuterol-ipratropium (DUO-NEB) 2.5 MG-0.5 MG/3 ML  3 mL Nebulization Q6H RT  
 [START ON 4/15/2021] methylPREDNISolone (PF) (SOLU-MEDROL) injection 40 mg  40 mg IntraVENous Q8H  
 hydroCHLOROthiazide (HYDRODIURIL) tablet 12.5 mg  12.5 mg Oral DAILY  sodium chloride (NS) flush 5-40 mL  5-40 mL IntraVENous Q8H  
 enoxaparin (LOVENOX) injection 40 mg  40 mg SubCUTAneous Q24H  
 nicotine (NICODERM CQ) 14 mg/24 hr patch 1 Patch  1 Patch TransDERmal DAILY  arformoteroL (BROVANA) neb solution 15 mcg  15 mcg Nebulization BID RT And  
 budesonide (PULMICORT) 250 mcg/2ml nebulizer susp  250 mcg Nebulization BID RT Review of Systems: A comprehensive review of systems was negative except for that written in the HPI. Objective:  
Vital Signs:   
Visit Vitals /83 Pulse 99 Temp 98.2 °F (36.8 °C) Resp 24 Ht 5' 10\" (1.778 m) Wt 76.5 kg (168 lb 10.4 oz) SpO2 92% BMI 24.20 kg/m² O2 Device: Nasal cannula O2 Flow Rate (L/min): 4 l/min Temp (24hrs), Av.2 °F (36.8 °C), Min:98 °F (36.7 °C), Max:98.5 °F (36.9 °C) Intake/Output:  
Last shift:       0701 -  1900 In: 48 [I.V.:50] Out: - Last 3 shifts: No intake/output data recorded. Intake/Output Summary (Last 24 hours) at 2021 1030 Last data filed at 2021 5190 Gross per 24 hour Intake 50 ml Output  Net 50 ml Physical Exam:  
General:  Alert, cooperative, no distress Head:  Normocephalic, without obvious abnormality, atraumatic. Eyes:  Conjunctivae/corneas clear. .  
Nose: Nares normal. Septum midline. Mucosa normal   
Throat: Lips, mucosa, and tongue normal. Teeth and gums normal.  
Neck: Supple, symmetrical, trachea midline Back:   Symmetric, no curvature. ROM normal.  
Lungs:   Clear to auscultation bilaterally. No wheezing appreciated.   
Chest wall:  No tenderness or deformity. Heart:  Regular rate and rhythm Abdomen:   Soft, non-tender. Bowel sounds normal.    
Extremities: Extremities normal, atraumatic, no cyanosis Skin: Skin color, texture, turgor normal. No rashes or lesions Lymph nodes: Cervical, supraclavicular nodes normal.  
Neurologic: Grossly nonfocal  
 
Data review:  
 
Recent Results (from the past 24 hour(s)) EKG, 12 LEAD, INITIAL Collection Time: 04/14/21  2:56 AM  
Result Value Ref Range Ventricular Rate 83 BPM  
 Atrial Rate 83 BPM  
 P-R Interval 168 ms QRS Duration 96 ms  
 Q-T Interval 370 ms QTC Calculation (Bezet) 434 ms Calculated P Axis 59 degrees Calculated R Axis -108 degrees Calculated T Axis 56 degrees Diagnosis Normal sinus rhythm with sinus arrhythmia Incomplete right bundle branch block When compared with ECG of 09-NOV-2020 12:20, No significant change was found Confirmed by [employer] Mehran ([id number]) on 4/14/2021 8:33:05 AM 
  
CBC WITH AUTOMATED DIFF Collection Time: 04/14/21  3:13 AM  
Result Value Ref Range WBC 7.8 4.1 - 11.1 K/uL  
 RBC 4.84 4.10 - 5.70 M/uL  
 HGB 15.8 12.1 - 17.0 g/dL HCT 49.8 36.6 - 50.3 % .9 (H) 80.0 - 99.0 FL  
 MCH 32.6 26.0 - 34.0 PG  
 MCHC 31.7 30.0 - 36.5 g/dL  
 RDW 15.6 (H) 11.5 - 14.5 % PLATELET 687 060 - 898 K/uL MPV 10.6 8.9 - 12.9 FL  
 NRBC 0.0 0  WBC ABSOLUTE NRBC 0.00 0.00 - 0.01 K/uL NEUTROPHILS 85 (H) 32 - 75 % LYMPHOCYTES 6 (L) 12 - 49 % MONOCYTES 8 5 - 13 % EOSINOPHILS 1 0 - 7 % BASOPHILS 0 0 - 1 % IMMATURE GRANULOCYTES 0 0.0 - 0.5 % ABS. NEUTROPHILS 6.6 1.8 - 8.0 K/UL  
 ABS. LYMPHOCYTES 0.5 (L) 0.8 - 3.5 K/UL  
 ABS. MONOCYTES 0.6 0.0 - 1.0 K/UL  
 ABS. EOSINOPHILS 0.1 0.0 - 0.4 K/UL  
 ABS. BASOPHILS 0.0 0.0 - 0.1 K/UL  
 ABS. IMM. GRANS. 0.0 0.00 - 0.04 K/UL  
 DF SMEAR SCANNED    
 RBC COMMENTS NORMOCYTIC, NORMOCHROMIC METABOLIC PANEL, COMPREHENSIVE  Collection Time: 04/14/21  3:13 AM Result Value Ref Range Sodium 143 136 - 145 mmol/L Potassium 3.5 3.5 - 5.1 mmol/L Chloride 107 97 - 108 mmol/L  
 CO2 33 (H) 21 - 32 mmol/L Anion gap 3 (L) 5 - 15 mmol/L Glucose 121 (H) 65 - 100 mg/dL BUN 19 6 - 20 MG/DL Creatinine 1.07 0.70 - 1.30 MG/DL  
 BUN/Creatinine ratio 18 12 - 20 GFR est AA >60 >60 ml/min/1.73m2 GFR est non-AA >60 >60 ml/min/1.73m2 Calcium 8.4 (L) 8.5 - 10.1 MG/DL Bilirubin, total 0.6 0.2 - 1.0 MG/DL  
 ALT (SGPT) 27 12 - 78 U/L  
 AST (SGOT) 23 15 - 37 U/L Alk. phosphatase 90 45 - 117 U/L Protein, total 6.2 (L) 6.4 - 8.2 g/dL Albumin 3.2 (L) 3.5 - 5.0 g/dL Globulin 3.0 2.0 - 4.0 g/dL A-G Ratio 1.1 1.1 - 2.2 SAMPLES BEING HELD Collection Time: 04/14/21  3:13 AM  
Result Value Ref Range SAMPLES BEING HELD RD SST   
 COMMENT Add-on orders for these samples will be processed based on acceptable specimen integrity and analyte stability, which may vary by analyte. CK W/ CKMB & INDEX Collection Time: 04/14/21  3:13 AM  
Result Value Ref Range CK - MB 2.2 <3.6 NG/ML  
 CK-MB Index 2.5 0.0 - 2.5 CK 89 39 - 308 U/L  
TROPONIN I Collection Time: 04/14/21  3:13 AM  
Result Value Ref Range Troponin-I, Qt. <0.05 <0.05 ng/mL NT-PRO BNP Collection Time: 04/14/21  3:13 AM  
Result Value Ref Range NT pro-BNP 1,367 (H) <125 PG/ML  
D DIMER Collection Time: 04/14/21  3:13 AM  
Result Value Ref Range D-dimer 1.29 (H) 0.00 - 0.65 mg/L FEU  
URIC ACID Collection Time: 04/14/21  3:13 AM  
Result Value Ref Range Uric acid 5.1 3.5 - 7.2 MG/DL  
SED RATE (ESR) Collection Time: 04/14/21  3:13 AM  
Result Value Ref Range Sed rate, automated 1 0 - 20 mm/hr POC EG7 Collection Time: 04/14/21  3:36 AM  
Result Value Ref Range  Calcium, ionized (POC) 1.25 1.12 - 1.32 mmol/L  
 pH (POC) 7.31 (L) 7.35 - 7.45    
 pCO2 (POC) 67.9 (H) 35.0 - 45.0 MMHG  
 pO2 (POC) 63 (L) 80 - 100 MMHG  
 HCO3 (POC) 34.5 (H) 22 - 26 MMOL/L Base excess (POC) 8 mmol/L  
 sO2 (POC) 88 (L) 92 - 97 % Site LEFT BRACHIAL Device: NASAL CANNULA Flow rate (POC) 4 L/M Allens test (POC) N/A Specimen type (POC) ARTERIAL Total resp. rate 26 COVID-19 RAPID TEST Collection Time: 04/14/21  6:16 AM  
Result Value Ref Range Specimen source Please find results under separate order COVID-19 rapid test Not detected NOTD Imaging: 
I have personally reviewed the patients radiographs and have reviewed the reports: 
3 new pulmonary nodules (2 in RUL, 1 in RLL); emphysema [place] , [place]

## 2021-04-14 NOTE — CONSULTS
ORTHOPAEDIC CONSULT NOTE Subjective:  
 
Date of Consultation:  April 14, 2021 Geovanny David is a 65 y.o. male who is being seen for right wrist pain and swelling. Pt reports onset of symptoms approx 4+ days ago. Localizes pain to the dorsal wrist. Worse with motion of the finger and wrist. 
  
Wife at bedside. RHD. Denies F/C/Flu like symptoms Patient Active Problem List  
 Diagnosis Date Noted  Tobacco abuse disorder 11/28/2016 Priority: 3 - Three Class: Chronic  Alcohol use 11/28/2016 Priority: 3 - Three Class: Chronic  Long term (current) use of non-steroidal anti-inflammatories (nsaid) 11/28/2016 Priority: 3 - Three Class: Present on Admission  Acute respiratory failure with hypoxia ([facility] [address] 04/14/2021  Lung mass 04/14/2021  Elevated PSA, less than 10 ng/ml 10/16/2017  Essential hypertension 10/16/2017  Herpes zoster without complication 82/97/3202  
 SOB (shortness of breath) on exertion 01/13/2017  Diverticulosis 12/08/2016  Diverticulosis of colon with hemorrhage 12/06/2016 Class: Present on Admission  DJD (degenerative joint disease) of knee 12/02/2014  Primary localized osteoarthrosis, lower leg 12/02/2014 Family History Problem Relation Age of Onset  No Known Problems Mother  Emphysema Father Social History Tobacco Use  Smoking status: Current Every Day Smoker Packs/day: 1.50 Years: 50.00 Pack years: 75.00 Types: Cigarettes  Smokeless tobacco: Never Used  Tobacco comment: electronic cigarettes in the past  
Substance Use Topics  Alcohol use: Yes Alcohol/week: 3.0 standard drinks Types: 3 Cans of beer per week Comment: social -3 drinks weekly--rum Past Medical History:  
Diagnosis Date  Chronic obstructive pulmonary disease ([facility] [address]  Diverticulitis  Hypertension  Ill-defined condition   
 kidney stones/ stant placed Past Surgical History: Procedure Laterality Date  COLONOSCOPY N/A 11/30/2016 COLONOSCOPY performed by Lucía Nuñez MD at [facility] ENDOSCOPY  COLONOSCOPY,ANSELMO CASTRO,FORCEP/CAUTERY  11/30/2016  HX BACK SURGERY    
 plate to upper neck  HX CERVICAL FUSION    
 HX ORTHOPAEDIC    
 left ring finger-trauma  HX ORTHOPAEDIC    
 right index finger  HX ORTHOPAEDIC  12/2/14 Bilateral total knee replacements  HX TONSILLECTOMY  HX UROLOGICAL    
 kidney stone extraction/stent Prior to Admission medications Medication Sig Start Date End Date Taking? Authorizing Provider  
fluticasone-umeclidinium-vilanterol (TRELEGY ELLIPTA) 100-62.5-25 mcg inhaler Take 1 Puff by inhalation daily. 1/30/20  Yes Alban Zhu III, DO  
albuterol (PROVENTIL HFA, VENTOLIN HFA, PROAIR HFA) 90 mcg/actuation inhaler Take 2 Puffs by inhalation every six (6) hours as needed for Wheezing. 10/29/19  Yes Alban Zhu III, DO  
hydroCHLOROthiazide (HYDRODIURIL) 25 mg tablet Take 0.5 Tabs by mouth daily. 1/30/20   Hughshanelle Ko III, DO  
 
Current Facility-Administered Medications Medication Dose Route Frequency  methylPREDNISolone (PF) (Solu-MEDROL) 125 mg/2 mL injection  albuterol (PROVENTIL VENTOLIN) 2.5 mg /3 mL (0.083 %) nebulizer solution  albuterol-ipratropium (DUO-NEB) 2.5 mg-0.5 mg/3 ml nebulizer solution  albuterol (PROVENTIL VENTOLIN) 2.5 mg /3 mL (0.083 %) nebulizer solution  cefTRIAXone (ROCEPHIN) 2 g in 0.9% sodium chloride (MBP/ADV) 50 mL MBP  2 g IntraVENous Q24H  
 azithromycin (ZITHROMAX) 500 mg in 0.9% sodium chloride 250 mL (VIAL-MATE)  500 mg IntraVENous Q24H  
 acetaminophen (TYLENOL) tablet 650 mg  650 mg Oral Q6H PRN  
 ondansetron (ZOFRAN) injection 4 mg  4 mg IntraVENous Q4H PRN  
 albuterol-ipratropium (DUO-NEB) 2.5 MG-0.5 MG/3 ML  3 mL Nebulization Q6H RT  
 [START ON 4/15/2021] methylPREDNISolone (PF) (SOLU-MEDROL) injection 40 mg  40 mg IntraVENous Q8H  
 hydroCHLOROthiazide (HYDRODIURIL) tablet 12.5 mg  12.5 mg Oral DAILY  sodium chloride (NS) flush 5-40 mL  5-40 mL IntraVENous Q8H  
 sodium chloride (NS) flush 5-40 mL  5-40 mL IntraVENous PRN  
 ibuprofen (MOTRIN) tablet 400 mg  400 mg Oral Q6H PRN  
 enoxaparin (LOVENOX) injection 40 mg  40 mg SubCUTAneous Q24H  
 labetaloL (NORMODYNE;TRANDATE) injection 20 mg  20 mg IntraVENous Q4H PRN  
 nicotine (NICODERM CQ) 14 mg/24 hr patch 1 Patch  1 Patch TransDERmal DAILY  ipratropium (ATROVENT) 0.02 % nebulizer solution 0.5 mg  0.5 mg Nebulization Q6H PRN  
 arformoteroL (BROVANA) neb solution 15 mcg  15 mcg Nebulization BID RT And  
 budesonide (PULMICORT) 250 mcg/2ml nebulizer susp  250 mcg Nebulization BID RT No Known Allergies Review of Systems:  A comprehensive review of systems was negative except for that written in the HPI. Mental Status: no dementia Objective:  
 
Patient Vitals for the past 8 hrs: 
 BP Temp Pulse Resp SpO2  
04/14/21 1334     92 % 04/14/21 1255 121/77 97.3 °F (36.3 °C) (!) 102 18 97 % 04/14/21 1215 119/77  94 19 94 % 04/14/21 1200 120/76  96 15 94 % 04/14/21 1145 119/74  95 19 93 % 04/14/21 1130 126/77  96 19 92 % 04/14/21 1115 123/75  95 19 90 % 04/14/21 1100 122/74  97 20 (!) 86 % 04/14/21 1045 131/79  100 22 90 % 04/14/21 1030 134/73  (!) 104 24 91 % 04/14/21 1015 117/78  (!) 101 27 92 % 04/14/21 1000 125/77  98 20 (!) 89 % 04/14/21 0945 137/80  (!) 103 22 91 % 04/14/21 0930 121/80  100 23 90 % 04/14/21 0915 130/76  99 19 (!) 89 % 04/14/21 0901 132/83  99    
04/14/21 0900 129/78  100 20 90 % 04/14/21 0845 111/79  (!) 104 24 90 % 04/14/21 0838     92 % 04/14/21 0830 125/75  99 20 91 % 04/14/21 0815 129/74  99 21 90 % 04/14/21 0800 130/79  100 20 90 % 04/14/21 0745 125/76  (!) 104 26 90 % 04/14/21 0730 135/83  (!) 109 25 (!) 89 % 04/14/21 0725 135/79 98.2 °F (36.8 °C) (!) 106 24 90 %  
21 0715 133/74  (!) 104 21 (!) 88 % 21 0700 133/79  (!) 102 22 (!) 88 % 21 0645 127/76  (!) 102 20 90 % Temp (24hrs), Av °F (36.7 °C), Min:97.3 °F (36.3 °C), Max:98.5 °F (36.9 °C) Gen: Well-developed,  in no acute distress HEENT: Pink conjunctivae, hearing intact to voice, moist mucous membranes, hoarse/raspy voice Neck: Supple Resp: No respiratory distress, +nasal canula Card: RRR, palpable distal pulse-equal bilaterally, birsk cap refill all distal digits Abd:  non-distended Musc: right dorsal hand/ wrist with swelling(edema) and ecchy. Minimal erythema. Intact flex/ext/intrinsics, composite fist, intact wrist flex/ext(approx 50% of the non affective side, limited by pain). Skin: No skin breakdown noted. Skin warm, pink, dry Neuro: Cranial nerves are grossly intact, no focal motor weakness, follows commands appropriately Psych: Good insight, oriented to person, place and time, alert Imaging Review: EXAM: XR WRIST RT AP/LAT/OBL MIN 3V 
 INDICATION: Swelling pain. 
 COMPARISON: None. 
 FINDINGS: Three  views of the right wrist demonstrate no fracture or other acute 
osseous or articular abnormality. The soft tissues are within normal limits. There are degenerative changes of the first and second metacarpal phalangeal 
joints and of the wrist 
 IMPRESSION:  No acute abnormality. Labs:  
Recent Results (from the past 24 hour(s)) EKG, 12 LEAD, INITIAL Collection Time: 21  2:56 AM  
Result Value Ref Range Ventricular Rate 83 BPM  
 Atrial Rate 83 BPM  
 P-R Interval 168 ms QRS Duration 96 ms  
 Q-T Interval 370 ms QTC Calculation (Bezet) 434 ms Calculated P Axis 59 degrees Calculated R Axis -108 degrees Calculated T Axis 56 degrees Diagnosis Normal sinus rhythm with sinus arrhythmia Incomplete right bundle branch block When compared with ECG of 2020 12:20, No significant change was found Confirmed by Hernan Wise ([id number]) on 4/14/2021 8:33:05 AM 
  
CBC WITH AUTOMATED DIFF Collection Time: 04/14/21  3:13 AM  
Result Value Ref Range WBC 7.8 4.1 - 11.1 K/uL  
 RBC 4.84 4.10 - 5.70 M/uL  
 HGB 15.8 12.1 - 17.0 g/dL HCT 49.8 36.6 - 50.3 % .9 (H) 80.0 - 99.0 FL  
 MCH 32.6 26.0 - 34.0 PG  
 MCHC 31.7 30.0 - 36.5 g/dL  
 RDW 15.6 (H) 11.5 - 14.5 % PLATELET 433 926 - 099 K/uL MPV 10.6 8.9 - 12.9 FL  
 NRBC 0.0 0  WBC ABSOLUTE NRBC 0.00 0.00 - 0.01 K/uL NEUTROPHILS 85 (H) 32 - 75 % LYMPHOCYTES 6 (L) 12 - 49 % MONOCYTES 8 5 - 13 % EOSINOPHILS 1 0 - 7 % BASOPHILS 0 0 - 1 % IMMATURE GRANULOCYTES 0 0.0 - 0.5 % ABS. NEUTROPHILS 6.6 1.8 - 8.0 K/UL  
 ABS. LYMPHOCYTES 0.5 (L) 0.8 - 3.5 K/UL  
 ABS. MONOCYTES 0.6 0.0 - 1.0 K/UL  
 ABS. EOSINOPHILS 0.1 0.0 - 0.4 K/UL  
 ABS. BASOPHILS 0.0 0.0 - 0.1 K/UL  
 ABS. IMM. GRANS. 0.0 0.00 - 0.04 K/UL  
 DF SMEAR SCANNED    
 RBC COMMENTS NORMOCYTIC, NORMOCHROMIC METABOLIC PANEL, COMPREHENSIVE Collection Time: 04/14/21  3:13 AM  
Result Value Ref Range Sodium 143 136 - 145 mmol/L Potassium 3.5 3.5 - 5.1 mmol/L Chloride 107 97 - 108 mmol/L  
 CO2 33 (H) 21 - 32 mmol/L Anion gap 3 (L) 5 - 15 mmol/L Glucose 121 (H) 65 - 100 mg/dL BUN 19 6 - 20 MG/DL Creatinine 1.07 0.70 - 1.30 MG/DL  
 BUN/Creatinine ratio 18 12 - 20 GFR est AA >60 >60 ml/min/1.73m2 GFR est non-AA >60 >60 ml/min/1.73m2 Calcium 8.4 (L) 8.5 - 10.1 MG/DL Bilirubin, total 0.6 0.2 - 1.0 MG/DL  
 ALT (SGPT) 27 12 - 78 U/L  
 AST (SGOT) 23 15 - 37 U/L Alk. phosphatase 90 45 - 117 U/L Protein, total 6.2 (L) 6.4 - 8.2 g/dL Albumin 3.2 (L) 3.5 - 5.0 g/dL Globulin 3.0 2.0 - 4.0 g/dL A-G Ratio 1.1 1.1 - 2.2 SAMPLES BEING HELD Collection Time: 04/14/21  3:13 AM  
Result Value Ref Range SAMPLES BEING HELD RD SST   
 COMMENT   Add-on orders for these samples will be processed based on acceptable specimen integrity and analyte stability, which may vary by analyte. CK W/ CKMB & INDEX Collection Time: 04/14/21  3:13 AM  
Result Value Ref Range CK - MB 2.2 <3.6 NG/ML  
 CK-MB Index 2.5 0.0 - 2.5 CK 89 39 - 308 U/L  
TROPONIN I Collection Time: 04/14/21  3:13 AM  
Result Value Ref Range Troponin-I, Qt. <0.05 <0.05 ng/mL NT-PRO BNP Collection Time: 04/14/21  3:13 AM  
Result Value Ref Range NT pro-BNP 1,367 (H) <125 PG/ML  
D DIMER Collection Time: 04/14/21  3:13 AM  
Result Value Ref Range D-dimer 1.29 (H) 0.00 - 0.65 mg/L FEU  
URIC ACID Collection Time: 04/14/21  3:13 AM  
Result Value Ref Range Uric acid 5.1 3.5 - 7.2 MG/DL  
SED RATE (ESR) Collection Time: 04/14/21  3:13 AM  
Result Value Ref Range Sed rate, automated 1 0 - 20 mm/hr POC EG7 Collection Time: 04/14/21  3:36 AM  
Result Value Ref Range Calcium, ionized (POC) 1.25 1.12 - 1.32 mmol/L  
 pH (POC) 7.31 (L) 7.35 - 7.45    
 pCO2 (POC) 67.9 (H) 35.0 - 45.0 MMHG  
 pO2 (POC) 63 (L) 80 - 100 MMHG  
 HCO3 (POC) 34.5 (H) 22 - 26 MMOL/L Base excess (POC) 8 mmol/L  
 sO2 (POC) 88 (L) 92 - 97 % Site LEFT BRACHIAL Device: NASAL CANNULA Flow rate (POC) 4 L/M Allens test (POC) N/A Specimen type (POC) ARTERIAL Total resp. rate 26 COVID-19 RAPID TEST Collection Time: 04/14/21  6:16 AM  
Result Value Ref Range Specimen source Please find results under separate order COVID-19 rapid test Not detected NOTD Impression:  
 
Patient Active Problem List  
 Diagnosis Date Noted  Tobacco abuse disorder 11/28/2016 Priority: 3 - Three Class: Chronic  Alcohol use 11/28/2016 Priority: 3 - Three Class: Chronic  Long term (current) use of non-steroidal anti-inflammatories (nsaid) 11/28/2016 Priority: 3 - Three Class: Present on Admission  Acute respiratory failure with hypoxia ([facility] [address] 04/14/2021  Lung mass 04/14/2021  Elevated PSA, less than 10 ng/ml 10/16/2017  Essential hypertension 10/16/2017  Herpes zoster without complication 28/78/5763  
 SOB (shortness of breath) on exertion 01/13/2017  Diverticulosis 12/08/2016  Diverticulosis of colon with hemorrhage 12/06/2016 Class: Present on Admission  DJD (degenerative joint disease) of knee 12/02/2014  Primary localized osteoarthrosis, lower leg 12/02/2014 Active Problems: 
  Acute respiratory failure with hypoxia ([facility] [address] (4/14/2021) Lung mass (4/14/2021) Plan: -  Low suspicion for infectious process -  Likely DJD/inflamatory related symptoms -  Encourage rest, compression and elevation -  Pt is on rocephin and solumedrol Will re-ck Thursday Dr. Jyoti Koenig aware and agrees with plan as above. Clay Hawkins PA-C [employer]

## 2021-04-14 NOTE — ED NOTES
Bedside and Verbal shift change report given to Bria Miles RN (oncoming nurse) by Dorian Perdomo RN (offgoing nurse). Report included the following information SBAR, ED Summary, MAR and Recent Results.

## 2021-04-14 NOTE — H&P
Hospitalist Admission Note NAME: Alvin Alvarado :  1950 MRN:  [id number] Date/Time:  2021 8:25 AM 
 
Patient PCP: Helen Rahman, DO 
______________________________________________________________________ Given the patient's current clinical presentation, I have a high level of concern for decompensation if discharged from the emergency department. Complex decision making was performed, which includes reviewing the patient's available past medical records, laboratory results, and x-ray films. My assessment of this patient's clinical condition and my plan of care is as follows. Assessment / Plan: 
Acute respiratory failure with hypoxia most likely secondary to COPD exacerbation versus community-acquired pneumonia New right lung nodules Admit to telemetry, status post IV steroid and duo nebs, will continue with nebs every 6 hours, Solu-Medrol 40 mg every 8. Continue with ceftriaxone and azithromycin started in the ED. Keep saturation above 92%, currently patient is on 4 L and saturating at 90%. Pulmonology consulted for the lung nodules, might need bronchoscopy CT scan of the chest showed: 
1. No evidence for pulmonary embolism. 2. Emphysema with 3 new right lung nodules, concerning for bronchogenic 
carcinoma. Further evaluation recommended with PET/CT. 2. Prior cervical fusion. Assess ambulatory pulse ox before discharge Right wrist pain, most likely tendinitis X-ray of the right wrist did not show any fracture, but wrist is swollen and patient has painful range of motion. He is a , therefore uses his hands repeatedly Start as needed ibuprofen, Ortho consulted for possible steroid injection Tobacco abuse Nicotine patch ordered, and patient advised on tobacco cessation Hypertension Resume hydrochlorothiazide, add as needed labetalol Code Status: Full code Surrogate Decision Maker: Wife DVT Prophylaxis: Lovenox GI Prophylaxis: not indicated Baseline: Ambulatory Subjective: CHIEF COMPLAINT: Right wrist pain HISTORY OF PRESENT ILLNESS:    
Rolando Watson is a 65 y.o.  male with past medical history of COPD, tobacco abuse hypertension who presents with right wrist pain that started yesterday, worse today and not relieved by Tylenol. Patient reported being short of breath for the last few months, especially upon exertion, and cannot walk far without being short of breath . patient denies any chest pain, palpitation, lower extremity edema Upon presentation to the ED, patient was found to be hypoxic in the 70s, hypertensive and wheezy. Further work-up in the ED showed normal CBC, mildly elevated proBNP. Chest x-ray was showing mild airspace disease on the right upper lobe, subsequent CTA of the chest 
Showed 1. No evidence for pulmonary embolism. 2. Emphysema with 3 new right lung nodules, concerning for bronchogenic 
carcinoma. Further evaluation recommended with PET/CT. 2. Prior cervical fusion. We were asked to admit for work up and evaluation of the above problems. Past Medical History:  
Diagnosis Date  Chronic obstructive pulmonary disease ([address]  Diverticulitis  Hypertension  Ill-defined condition   
 kidney stones/ stant placed Past Surgical History:  
Procedure Laterality Date  COLONOSCOPY N/A 11/30/2016 COLONOSCOPY performed by Rob Campos MD at [facility] ENDOSCOPY  COLONOSCOPY,KADI DUARTE/CAUTERY  11/30/2016  HX BACK SURGERY    
 plate to upper neck  HX CERVICAL FUSION    
 HX ORTHOPAEDIC    
 left ring finger-trauma  HX ORTHOPAEDIC    
 right index finger  HX ORTHOPAEDIC  12/2/14 Bilateral total knee replacements  HX TONSILLECTOMY  HX UROLOGICAL    
 kidney stone extraction/stent Social History Tobacco Use  Smoking status: Current Every Day Smoker Packs/day: 1.50 Years: 50.00   Pack years: 75.00  
 Types: Cigarettes  Smokeless tobacco: Never Used  Tobacco comment: electronic cigarettes in the past  
Substance Use Topics  Alcohol use: Yes Alcohol/week: 3.0 standard drinks Types: 3 Cans of beer per week Comment: social -3 drinks weekly--rum Family History Problem Relation Age of Onset  No Known Problems Mother  Emphysema Father No Known Allergies Prior to Admission medications Medication Sig Start Date End Date Taking? Authorizing Provider  
hydroCHLOROthiazide (HYDRODIURIL) 25 mg tablet Take 0.5 Tabs by mouth daily. 1/30/20   Marissa Zhu B III, DO  
fluticasone-umeclidinium-vilanterol (TRELEGY ELLIPTA) 100-62.5-25 mcg inhaler Take 1 Puff by inhalation daily. 1/30/20   Danial Schirmer B III, DO  
albuterol (PROVENTIL HFA, VENTOLIN HFA, PROAIR HFA) 90 mcg/actuation inhaler Take 2 Puffs by inhalation every six (6) hours as needed for Wheezing. 10/29/19   Danial Schirmer B III, DO  
 
 
REVIEW OF SYSTEMS:    
I am not able to complete the review of systems because: The patient is intubated and sedated The patient has altered mental status due to his acute medical problems The patient has baseline aphasia from prior stroke(s) The patient has baseline dementia and is not reliable historian The patient is in acute medical distress and unable to provide information Total of 12 systems reviewed as follows:   
   POSITIVE= underlined text  Negative = text not underlined General:  fever, chills, sweats, generalized weakness, weight loss/gain,  
   loss of appetite Eyes:    blurred vision, eye pain, loss of vision, double vision ENT:    rhinorrhea, pharyngitis Respiratory:   cough, sputum production, SOB, ROSARIO, wheezing, pleuritic pain  
Cardiology:   chest pain, palpitations, orthopnea, PND, edema, syncope Gastrointestinal:  abdominal pain , N/V, diarrhea, dysphagia, constipation, bleeding Genitourinary:  frequency, urgency, dysuria, hematuria, incontinence Muskuloskeletal :  arthralgia, myalgia, back pain Hematology:  easy bruising, nose or gum bleeding, lymphadenopathy Dermatological: rash, ulceration, pruritis, color change / jaundice Endocrine:   hot flashes or polydipsia Neurological:  headache, dizziness, confusion, focal weakness, paresthesia, Speech difficulties, memory loss, gait difficulty Psychological: Feelings of anxiety, depression, agitation Objective: VITALS:   
Visit Vitals /79 (BP 1 Location: Left upper arm, BP Patient Position: At rest) Pulse (!) 106 Temp 98.2 °F (36.8 °C) Resp 24 Ht 5' 10\" (1.778 m) Wt 76.5 kg (168 lb 10.4 oz) SpO2 90% BMI 24.20 kg/m² PHYSICAL EXAM: 
 
General:    Alert, cooperative, no distress, appears stated age. HEENT: Atraumatic, anicteric sclerae, pink conjunctivae No oral ulcers, mucosa moist, throat clear, dentition fair Neck:  Supple, symmetrical,  thyroid: non tender Lungs:   Decreased b/l breath sounds   No Wheezing or Rhonchi. No rales. Chest wall:  No tenderness  No Accessory muscle use. Heart:   Regular  rhythm,  No  murmur   No edema Abdomen:   Soft, non-tender. Not distended. Bowel sounds normal 
Extremities: No cyanosis. No clubbing,   
  Skin turgor normal, Capillary refill normal, Radial dial pulse 2+ Skin:     Not pale. Not Jaundiced  No rashes Psych:  Good insight. Not depressed. Not anxious or agitated. Neurologic: EOMs intact. No facial asymmetry. No aphasia or slurred speech. Symmetrical strength, Sensation grossly intact. Alert and oriented X 4.  
 
_______________________________________________________________________ Care Plan discussed with: 
  Comments Patient x Family RN x Care Manager Consultant:     
_______________________________________________________________________ Expected  Disposition:  
Home with Family x HH/PT/OT/RN   
SNF/LTC   
[facility] ________________________________________________________________________ TOTAL TIME: 65 Minutes Critical Care Provided     Minutes non procedure based Comments  
 x Reviewed previous records  
>50% of visit spent in counseling and coordination of care x Discussion with patient and/or family and questions answered 
  
 
________________________________________________________________________ Signed: Ilya Atkinson MD 
 
Procedures: see electronic medical records for all procedures/Xrays and details which were not copied into this note but were reviewed prior to creation of Plan. LAB DATA REVIEWED:   
Recent Results (from the past 24 hour(s)) EKG, 12 LEAD, INITIAL Collection Time: 04/14/21  2:56 AM  
Result Value Ref Range Ventricular Rate 83 BPM  
 Atrial Rate 83 BPM  
 P-R Interval 168 ms QRS Duration 96 ms  
 Q-T Interval 370 ms QTC Calculation (Bezet) 434 ms Calculated P Axis 59 degrees Calculated R Axis -108 degrees Calculated T Axis 56 degrees Diagnosis Normal sinus rhythm with sinus arrhythmia Right superior axis deviation Incomplete right bundle branch block Right ventricular hypertrophy When compared with ECG of 09-NOV-2020 12:20, No significant change was found CBC WITH AUTOMATED DIFF Collection Time: 04/14/21  3:13 AM  
Result Value Ref Range WBC 7.8 4.1 - 11.1 K/uL  
 RBC 4.84 4.10 - 5.70 M/uL  
 HGB 15.8 12.1 - 17.0 g/dL HCT 49.8 36.6 - 50.3 % .9 (H) 80.0 - 99.0 FL  
 MCH 32.6 26.0 - 34.0 PG  
 MCHC 31.7 30.0 - 36.5 g/dL  
 RDW 15.6 (H) 11.5 - 14.5 % PLATELET 791 564 - 784 K/uL MPV 10.6 8.9 - 12.9 FL  
 NRBC 0.0 0  WBC ABSOLUTE NRBC 0.00 0.00 - 0.01 K/uL NEUTROPHILS 85 (H) 32 - 75 % LYMPHOCYTES 6 (L) 12 - 49 % MONOCYTES 8 5 - 13 % EOSINOPHILS 1 0 - 7 % BASOPHILS 0 0 - 1 % IMMATURE GRANULOCYTES 0 0.0 - 0.5 % ABS. NEUTROPHILS 6.6 1.8 - 8.0 K/UL  
 ABS. LYMPHOCYTES 0.5 (L) 0.8 - 3.5 K/UL  
 ABS. MONOCYTES 0.6 0.0 - 1.0 K/UL  
 ABS. EOSINOPHILS 0.1 0.0 - 0.4 K/UL  
 ABS. BASOPHILS 0.0 0.0 - 0.1 K/UL  
 ABS. IMM. GRANS. 0.0 0.00 - 0.04 K/UL  
 DF SMEAR SCANNED    
 RBC COMMENTS NORMOCYTIC, NORMOCHROMIC METABOLIC PANEL, COMPREHENSIVE Collection Time: 04/14/21  3:13 AM  
Result Value Ref Range Sodium 143 136 - 145 mmol/L Potassium 3.5 3.5 - 5.1 mmol/L Chloride 107 97 - 108 mmol/L  
 CO2 33 (H) 21 - 32 mmol/L Anion gap 3 (L) 5 - 15 mmol/L Glucose 121 (H) 65 - 100 mg/dL BUN 19 6 - 20 MG/DL Creatinine 1.07 0.70 - 1.30 MG/DL  
 BUN/Creatinine ratio 18 12 - 20 GFR est AA >60 >60 ml/min/1.73m2 GFR est non-AA >60 >60 ml/min/1.73m2 Calcium 8.4 (L) 8.5 - 10.1 MG/DL Bilirubin, total 0.6 0.2 - 1.0 MG/DL  
 ALT (SGPT) 27 12 - 78 U/L  
 AST (SGOT) 23 15 - 37 U/L Alk. phosphatase 90 45 - 117 U/L Protein, total 6.2 (L) 6.4 - 8.2 g/dL Albumin 3.2 (L) 3.5 - 5.0 g/dL Globulin 3.0 2.0 - 4.0 g/dL A-G Ratio 1.1 1.1 - 2.2 SAMPLES BEING HELD Collection Time: 04/14/21  3:13 AM  
Result Value Ref Range SAMPLES BEING HELD RD SST   
 COMMENT Add-on orders for these samples will be processed based on acceptable specimen integrity and analyte stability, which may vary by analyte. CK W/ CKMB & INDEX Collection Time: 04/14/21  3:13 AM  
Result Value Ref Range CK - MB 2.2 <3.6 NG/ML  
 CK-MB Index 2.5 0.0 - 2.5 CK 89 39 - 308 U/L  
TROPONIN I Collection Time: 04/14/21  3:13 AM  
Result Value Ref Range Troponin-I, Qt. <0.05 <0.05 ng/mL NT-PRO BNP Collection Time: 04/14/21  3:13 AM  
Result Value Ref Range NT pro-BNP 1,367 (H) <125 PG/ML  
D DIMER Collection Time: 04/14/21  3:13 AM  
Result Value Ref Range D-dimer 1.29 (H) 0.00 - 0.65 mg/L FEU  
URIC ACID Collection Time: 04/14/21  3:13 AM  
Result Value Ref Range Uric acid 5.1 3.5 - 7.2 MG/DL  
POC EG7 Collection Time: 04/14/21  3:36 AM  
Result Value Ref Range  Calcium, ionized (POC) 1.25 1.12 - 1.32 mmol/L  
 pH (POC) 7.31 (L) 7.35 - 7.45    
 pCO2 (POC) 67.9 (H) 35.0 - 45.0 MMHG  
 pO2 (POC) 63 (L) 80 - 100 MMHG  
 HCO3 (POC) 34.5 (H) 22 - 26 MMOL/L Base excess (POC) 8 mmol/L  
 sO2 (POC) 88 (L) 92 - 97 % Site LEFT BRACHIAL Device: NASAL CANNULA Flow rate (POC) 4 L/M Allens test (POC) N/A Specimen type (POC) ARTERIAL Total resp. rate 26 COVID-19 RAPID TEST Collection Time: 04/14/21  6:16 AM  
Result Value Ref Range Specimen source Please find results under separate order COVID-19 rapid test Not detected NOTD

## 2021-04-14 NOTE — ACP (ADVANCE CARE PLANNING)
Advance Care Planning Note NAME: Layla Hilton :  1950 MRN:  [id number] Date/Time:  2021 8:31 AM 
 
Active Diagnoses: 
Hospital Problems  Date Reviewed: 2020 Codes Class Noted POA Acute respiratory failure with hypoxia [facility]) ICD-10-CM: J96.01 
ICD-9-CM: 518.81  2021 Unknown Lung mass ICD-10-CM: R91.8 ICD-9-CM: 786.6  2021 Yes These active diagnoses are of sufficient risk that focused discussion on advance care planning is indicated in order to allow the patient to thoughtfully consider personal goals of care, and if situations arise that prevent the ability to personally give input, to ensure appropriate representation of their personal desires for different levels and aggressiveness of care. Discussion:  
Code status addressed and wants to be a Full Code. Patient wants central line and vasopressors if needed. Patient would also want a feeding tube, if needed, for nutritional support. Patient  would like to assign his wife Shabbir Adamson [phone]  
 
 as the surrogate decision maker. Persons present and participating in discussion: Kirk Santos MD 
 
 
Time Spent:  
Total time spent face-to-face in education and discussion:   16  minutes.   
 
 
 
Bruna Renteria MD  
Hospitalist

## 2021-04-14 NOTE — ED NOTES
Assumed care of pt from triage. Pt CC of right wrist pain starting yesterday. Pt denies injury. In triage pt O2 reading was 79% on RA. Pt on 4 L at this time and is 91%. Pt states he is a chronic smoker and has hx of COPD. Pt denies cardiac hx. Pt on monitor x 3. VSS. Call bell in reach. Will continue to monitor.

## 2021-04-14 NOTE — ED NOTES
TRANSFER - OUT REPORT: 
 
Verbal report given to Ashleigh(name) on Danya Lafleur  being transferred to Neuro/Tele(unit) for routine progression of care Report consisted of patients Situation, Background, Assessment and  
Recommendations(SBAR). Information from the following report(s) SBAR, ED Summary and MAR was reviewed with the receiving nurse. Lines:  
Peripheral IV 04/14/21 Right Antecubital (Active) Site Assessment Clean, dry, & intact 04/14/21 0315 Phlebitis Assessment 0 04/14/21 0315 Infiltration Assessment 0 04/14/21 0315 Dressing Status Clean, dry, & intact 04/14/21 0315 Dressing Type Transparent 04/14/21 0315 Hub Color/Line Status Pink;Capped;Flushed;Patent 04/14/21 0315 Action Taken Blood drawn 04/14/21 0315 Opportunity for questions and clarification was provided.

## 2021-04-14 NOTE — PROGRESS NOTES
End of Shift Note Bedside shift change report given to  (oncoming nurse) by FRANK Domínguez (offgoing nurse). Report included the following information SBAR, Kardex, ED Summary, Intake/Output, MAR, Accordion, Recent Results and Med Rec Status Shift worked:  days Shift summary and any significant changes:  
  admitted to NSTU, ortho consult, doppler ordered for R arm Concerns for physician to address:  R arm swelling Zone phone for oncoming shift:   [phone] Patient Information Duke Parrish 
65 y.o. 
4/14/2021  2:45 AM by Yudelka Gunn MD. Duke Prarish was admitted from Home 
 
Problem List 
Patient Active Problem List  
 Diagnosis Date Noted  Tobacco abuse disorder 11/28/2016 Priority: 3 - Three Class: Chronic  Alcohol use 11/28/2016 Priority: 3 - Three Class: Chronic  Long term (current) use of non-steroidal anti-inflammatories (nsaid) 11/28/2016 Priority: 3 - Three Class: Present on Admission  Acute respiratory failure with hypoxia ([facility] [address] 04/14/2021  Lung mass 04/14/2021  Elevated PSA, less than 10 ng/ml 10/16/2017  Essential hypertension 10/16/2017  Herpes zoster without complication 41/44/5212  
 SOB (shortness of breath) on exertion 01/13/2017  Diverticulosis 12/08/2016  Diverticulosis of colon with hemorrhage 12/06/2016 Class: Present on Admission  DJD (degenerative joint disease) of knee 12/02/2014  Primary localized osteoarthrosis, lower leg 12/02/2014 Past Medical History:  
Diagnosis Date  Chronic obstructive pulmonary disease ([address]  Diverticulitis  Hypertension  Ill-defined condition   
 kidney stones/ stant placed Core Measures: CVA: No No 
CHF:No No 
PNA:No No 
 
Activity: 
Activity Level: Up with Assistance Number times ambulated in hallways past shift: 1 Number of times OOB to chair past shift: 0 Cardiac:  
Cardiac Monitoring: Yes     
Cardiac Rhythm: Sinus tachycardia Access:  
Current line(s): PIV Genitourinary:  
Urinary status: voiding Respiratory:  
O2 Device: Nasal cannula Chronic home O2 use?: NO Incentive spirometer at bedside: NO 
  
 
GI: 
  
Current diet:  DIET CARDIAC Regular Passing flatus: YES Tolerating current diet: YES Pain Management:  
Patient states pain is manageable on current regimen: YES Skin: 
Ender Score: 20 Interventions: increase time out of bed Patient Safety: 
Fall Score: Total Score: 1 Interventions: bed/chair alarm, assistive device (walker, cane, etc), gripper socks and pt to call before getting OOB 
  
@Rollbelt 
@dexterity to release roll belt  Yes/No ( must document dexterity  here by stating Yes or No here, otherwise this is a restraint and must follow restraint documentation policy.) DVT prophylaxis: DVT prophylaxis Med- Yes DVT prophylaxis SCD or LEONID- No  
 
Wounds: (If Applicable) Wounds- No 
Location Active Consults: 
IP CONSULT TO ORTHOPEDIC SURGERY 
IP CONSULT TO PULMONOLOGY Length of Stay: 
Expected LOS: - - - Actual LOS: 0 Discharge Plan: No TBD FRANK Domínguez

## 2021-04-15 NOTE — PROGRESS NOTES
Problem: Chronic Obstructive Pulmonary Disease (COPD) Goal: *Oxygen saturation during activity within specified parameters Outcome: Progressing Towards Goal 
Goal: *Able to remain out of bed as prescribed Outcome: Progressing Towards Goal 
Goal: *Absence of hypoxia Outcome: Progressing Towards Goal 
Goal: *Optimize nutritional status Outcome: Progressing Towards Goal 
  
Problem: Breathing Pattern - Ineffective Goal: *Absence of hypoxia Outcome: Progressing Towards Goal 
Goal: *Use of effective breathing techniques Outcome: Progressing Towards Goal 
  
Problem: Patient Education: Go to Patient Education Activity Goal: Patient/Family Education Outcome: Progressing Towards Goal 
  
Problem: Pneumonia: Discharge Outcomes Goal: *Demonstrates progressive activity Outcome: Progressing Towards Goal 
Goal: *Describes follow-up/return visits to physicians Outcome: Progressing Towards Goal 
Goal: *Tolerating diet Outcome: Progressing Towards Goal 
Goal: *Verbalizes name, dosage, time, side effects, and number of days to continue medications Outcome: Progressing Towards Goal 
Goal: *Influenza immunization Outcome: Progressing Towards Goal 
Goal: *Pneumococcal immunization Outcome: Progressing Towards Goal 
Goal: *Respiratory status at baseline Outcome: Progressing Towards Goal 
Goal: *Vital signs within defined limits Outcome: Progressing Towards Goal 
Goal: *Describes available resources and support systems Outcome: Progressing Towards Goal 
Goal: *Optimal pain control at patient's stated goal 
Outcome: Progressing Towards Goal

## 2021-04-15 NOTE — PROGRESS NOTES
ADULT PROTOCOL: JET AEROSOL  REASSESSMENT Patient  Reyes Settler     65 y.o.   male     4/15/2021  7:57 PM 
 
Breath Sounds Pre Procedure: Right Breath Sounds: Diminished Left Breath Sounds: Diminished Breath Sounds Post Procedure: Right Breath Sounds: Diminished Left Breath Sounds: Diminished Breathing pattern: Pre procedure Breathing Pattern: Regular Post procedure Breathing Pattern: Regular Heart Rate: Pre procedure Pulse: 79 Post procedure Pulse: 77 Resp Rate: Pre procedure Respirations: 20 
         Post procedure Respirations: 20 
 
Cough: Pre procedure Cough: Non-productive Post procedure Cough: Non-productive Oxygen: O2 Device: Hi flow nasal cannula  5LPM 
   Changed:  NO SpO2: Pre procedure SpO2: 94 %   WITH oxygen Post procedure SpO2: 93 %  WITH oxygen Nebulizer Therapy: Current medications Aerosolized Medications: DuoNeb, Brovana, Pulmicort Changed: NO 
 
Problem List:  
Patient Active Problem List  
Diagnosis Code  DJD (degenerative joint disease) of knee M17.10  Primary localized osteoarthrosis, lower leg M17.10  Tobacco abuse disorder Z72.0  Alcohol use Z72.89  Long term (current) use of non-steroidal anti-inflammatories (nsaid) Z79.1  Diverticulosis of colon with hemorrhage K57.31  
 Diverticulosis K57.90  
 SOB (shortness of breath) on exertion R06.02  
 Elevated PSA, less than 10 ng/ml R97.20  Essential hypertension I10  
 Herpes zoster without complication I94.7  Acute respiratory failure with hypoxia (HCC) J96.01  
 Lung mass R91.8 Respiratory Therapist: Rut Williamson

## 2021-04-15 NOTE — PROGRESS NOTES
End of Shift Note 
  Bedside shift change report given to Maco Burrell RN (oncoming nurse) by Favian Brooks RN (offgoing nurse). Report included the following information SBAR, Kardex, ED Summary, Intake/Output, MAR, Accordion, Recent Results and Med Rec Status 
  
Shift worked: [place] Shift summary and any significant changes:  
  doppler ordered for bilat arms High flow O2 ordered due to patient's low O2 sats 
   
Concerns for physician to address:  R arm swelling Zone phone for oncoming shift:   [phone]  
  
Patient Information Yolanda Runao 
65 y.o. 
4/14/2021  2:45 AM by Ankush Sarabia MD. Yolanda Ruano was admitted from Home 
  
Problem List 
     
Patient Active Problem List  
  Diagnosis Date Noted  Tobacco abuse disorder 11/28/2016  
    Priority: 3 - Three  
    Class: Chronic  Alcohol use 11/28/2016  
    Priority: 3 - Three  
    Class: Chronic  Long term (current) use of non-steroidal anti-inflammatories (nsaid) 11/28/2016  
    Priority: 3 - Three  
    Class: Present on Admission  Acute respiratory failure with hypoxia ([facility] [address] 04/14/2021  Lung mass 04/14/2021  Elevated PSA, less than 10 ng/ml 10/16/2017  Essential hypertension 10/16/2017  Herpes zoster without complication 66/12/1069  
 SOB (shortness of breath) on exertion 01/13/2017  Diverticulosis 12/08/2016  Diverticulosis of colon with hemorrhage 12/06/2016  
    Class: Present on Admission  DJD (degenerative joint disease) of knee 12/02/2014  Primary localized osteoarthrosis, lower leg 12/02/2014  
  
    
Past Medical History:  
Diagnosis Date  Chronic obstructive pulmonary disease (HCC)    
 Diverticulitis    
 Hypertension    
 Ill-defined condition    
  kidney stones/ stant placed  
  
  
Core Measures: PNA: YES 
  
Activity: 
Activity Level: Up with Assistance Number times ambulated in hallways past shift: 1 
  
Cardiac:  
Cardiac Monitoring: Yes     
Cardiac Rhythm: NSR, Sinus tachycardia   
Access:  
Current line(s): PIV  
  
Genitourinary:  
Urinary status: Voiding 
  
Respiratory:  
O2 Device: Nasal cannula Chronic home O2 use?: NO 
  
GI: 
Current diet:  DIET CARDIAC Regular Passing flatus: YES Tolerating current diet: YES 
  
Pain Management:  
Patient states pain is manageable on current regimen: YES 
  
Skin: 
Ender Score: 20 Interventions: increase time out of bed Patient Safety: 
Fall Score: Total Score: 1 Interventions: bed/chair alarm, assistive device (walker, cane, etc), gripper socks and pt to call before getting OOB 
  
DVT prophylaxis: DVT prophylaxis Med- Yes DVT prophylaxis SCD or LEONID- No  
  
Wounds: (If Applicable) Wounds- No 
  
Active Consults: 
IP CONSULT TO ORTHOPEDIC SURGERY 
IP CONSULT TO PULMONOLOGY 
  
Length of Stay: 
Expected LOS: - - - Actual LOS: 1d 
Discharge Plan: JAZMYNE Liu

## 2021-04-15 NOTE — PROGRESS NOTES
Transition of Care Plan: 
 
RUR: 9% Disposition: TBD- home w/ f/u & family support Follow up appointments: PCP, Pulmonary DME needed: May need home O2??? 
Transportation at Discharge: Self- pt drove himself [place] or means to access home:      Yes  Medicare letter: Needed at d/c. Caregiver Contact: EL Lazaro, [phone] Discharge Caregiver contacted prior to discharge? Reason for Admission:  Acute respiratory failure w/ hypoxia RUR Score:          9% Plan for utilizing home health:      No current need identified at this time. PCP: First and Last name:  Twin Toledo DO Name of Practice: [facility], Are you a current patient: Yes/No: Yes Approximate date of last visit: 6 months ago Can you participate in a virtual visit with your PCP: No 
                 
Current Advanced Directive/Advance Care Plan: Full Code Advance Care Planning General Advance Care Planning (ACP) Conversation Date of Conversation: 4/15/2021 Conducted with: Patient with Decision Making Capacity Healthcare Decision Maker:  
Click here to complete [place] including selection of the Healthcare Decision Maker Relationship (ie \"Primary\") Today we documented Decision Maker(s) consistent with ACP documents on file. EL Lazaro, [phone] Secondary/ dtr- T. Mike Erickson, [phone] (unsure how to spell first name/ AMD doc unclear) Content/Action Overview: Has ACP document(s) on file - reflects the patient's care preferences Reviewed DNR/DNI and patient elects Full Code (Attempt Resuscitation) Length of Voluntary ACP Conversation in minutes:  <16 minutes (Non-Billable) Transition of Care Plan:         Home w/ f/u appointments CM reviewed chart. CM met w/ pt at bedside to introduce role & complete assessment.  Pt's eldest dtr/ secondary decision maker also present during assessment. Pt confirmed demographic information is up to date. Pt lives at address on file w/ his wife in 3 level/ 4 [place] house. Family lives nearby. Pt reported being independent w/ ADLS/ IADLS & drives. Pt drove himself to the ED & anticipates driving himself home at d/c. Pt reported no current use or access to DME; no home O2. Pt currently on 6L O2. Pt has prior hx of IPR at [address] [place] [address]; no SNF/ HH. Pt reported no addtl concerns or questions at this time. CM will continue to follow. Care Management Interventions PCP Verified by CM: Yes 
Palliative Care Criteria Met (RRAT>21 & CHF Dx)?: No 
Mode of Transport at Discharge: Self(pt drove himself) Transition of Care Consult (CM Consult): Discharge Planning Physical Therapy Consult: No 
Occupational Therapy Consult: No 
Current Support Network: Lives with Spouse, Family Lives [place], Own Home(3 levels/ [address] [place]) Confirm Follow Up Transport: Self Discharge Location Discharge Placement: Home with family assistance(& f/u appts) PAULIE Palma Intern Care Management

## 2021-04-15 NOTE — PROGRESS NOTES
ADULT PROTOCOL: JET AEROSOL ASSESSMENT Patient  Kevin Moreno     65 y.o.   male     4/14/2021  8:23 PM 
 
Breath Sounds Pre Procedure: Right Breath Sounds: Diminished Left Breath Sounds: Diminished Breath Sounds Post Procedure: Right Breath Sounds: Diminished Left Breath Sounds: Diminished Breathing pattern: Pre procedure Breathing Pattern: Regular Post procedure Breathing Pattern: Regular Heart Rate: Pre procedure Pulse: 89 
         Post procedure Pulse: 84 Resp Rate: Pre procedure Respirations: 17 Post procedure Respirations: 18 
 
Cough: Pre procedure Cough: Non-productive Post procedure Cough: Non-productive Oxygen: O2 Device: Nasal cannula   4. 5LPM 
   Changed: NO SpO2: Pre procedure SpO2: 93 % WITH oxygen Post procedure SpO2: 92 % WITH oxygen Nebulizer Therapy: Current medications Aerosolized Medications: DuoNeb, Brovana, Pulmicort Changed: NO Smoking History: Previous smoker Problem List:  
Patient Active Problem List  
Diagnosis Code  DJD (degenerative joint disease) of knee M17.10  Primary localized osteoarthrosis, lower leg M17.10  Tobacco abuse disorder Z72.0  Alcohol use Z72.89  Long term (current) use of non-steroidal anti-inflammatories (nsaid) Z79.1  Diverticulosis of colon with hemorrhage K57.31  
 Diverticulosis K57.90  
 SOB (shortness of breath) on exertion R06.02  
 Elevated PSA, less than 10 ng/ml R97.20  Essential hypertension I10  
 Herpes zoster without complication B51.3  Acute respiratory failure with hypoxia (HCC) J96.01  
 Lung mass R91.8 Respiratory Therapist: Desmond Fraser

## 2021-04-15 NOTE — PROGRESS NOTES
Problem: Chronic Obstructive Pulmonary Disease (COPD) Goal: *Oxygen saturation during activity within specified parameters Outcome: Progressing Towards Goal 
Goal: *Able to remain out of bed as prescribed Outcome: Progressing Towards Goal 
Goal: *Absence of hypoxia Outcome: Progressing Towards Goal 
Goal: *Optimize nutritional status Outcome: Progressing Towards Goal 
  
Problem: Patient Education: Go to Patient Education Activity Goal: Patient/Family Education Outcome: Progressing Towards Goal 
  
Problem: Breathing Pattern - Ineffective Goal: *Absence of hypoxia Outcome: Progressing Towards Goal 
Goal: *Use of effective breathing techniques Outcome: Progressing Towards Goal 
  
Problem: Patient Education: Go to Patient Education Activity Goal: Patient/Family Education Outcome: Progressing Towards Goal 
  
Problem: Patient Education: Go to Patient Education Activity Goal: Patient/Family Education Outcome: Progressing Towards Goal 
  
Problem: Pneumonia: Day 1 Goal: Off Pathway (Use only if patient is Off Pathway) Outcome: Progressing Towards Goal 
Goal: Activity/Safety Outcome: Progressing Towards Goal 
Goal: Consults, if ordered Outcome: Progressing Towards Goal 
Goal: Diagnostic Test/Procedures Outcome: Progressing Towards Goal 
Goal: Nutrition/Diet Outcome: Progressing Towards Goal 
Goal: Medications Outcome: Progressing Towards Goal 
Goal: Respiratory Outcome: Progressing Towards Goal 
Goal: Treatments/Interventions/Procedures Outcome: Progressing Towards Goal 
Goal: Psychosocial 
Outcome: Progressing Towards Goal 
Goal: *Oxygen saturation within defined limits Outcome: Progressing Towards Goal 
Goal: *Influenza vaccine administered (October-March) Outcome: Progressing Towards Goal 
Goal: *Pneumoccocal vaccine administered Outcome: Progressing Towards Goal 
Goal: *Hemodynamically stable Outcome: Progressing Towards Goal 
Goal: *Demonstrates progressive activity Outcome: Progressing Towards Goal 
Goal: *Tolerating diet Outcome: Progressing Towards Goal 
  
Problem: Pneumonia: Day 2 Goal: Off Pathway (Use only if patient is Off Pathway) Outcome: Progressing Towards Goal 
Goal: Activity/Safety Outcome: Progressing Towards Goal 
Goal: Consults, if ordered Outcome: Progressing Towards Goal 
Goal: Diagnostic Test/Procedures Outcome: Progressing Towards Goal 
Goal: Nutrition/Diet Outcome: Progressing Towards Goal 
Goal: Discharge Planning Outcome: Progressing Towards Goal 
Goal: Medications Outcome: Progressing Towards Goal 
Goal: Respiratory Outcome: Progressing Towards Goal 
Goal: Treatments/Interventions/Procedures Outcome: Progressing Towards Goal 
Goal: Psychosocial 
Outcome: Progressing Towards Goal 
Goal: *Oxygen saturation within defined limits Outcome: Progressing Towards Goal 
Goal: *Hemodynamically stable Outcome: Progressing Towards Goal 
Goal: *Demonstrates progressive activity Outcome: Progressing Towards Goal 
Goal: *Tolerating diet Outcome: Progressing Towards Goal 
Goal: *Optimal pain control at patient's stated goal 
Outcome: Progressing Towards Goal 
  
Problem: Pneumonia: Day 3 Goal: Off Pathway (Use only if patient is Off Pathway) Outcome: Progressing Towards Goal 
Goal: Activity/Safety Outcome: Progressing Towards Goal 
Goal: Consults, if ordered Outcome: Progressing Towards Goal 
Goal: Diagnostic Test/Procedures Outcome: Progressing Towards Goal 
Goal: Nutrition/Diet Outcome: Progressing Towards Goal 
Goal: Discharge Planning Outcome: Progressing Towards Goal 
Goal: Medications Outcome: Progressing Towards Goal 
Goal: Respiratory Outcome: Progressing Towards Goal 
Goal: Treatments/Interventions/Procedures Outcome: Progressing Towards Goal 
Goal: Psychosocial 
Outcome: Progressing Towards Goal 
Goal: *Oxygen saturation within defined limits Outcome: Progressing Towards Goal 
Goal: *Hemodynamically stable Outcome: Progressing Towards Goal 
Goal: *Demonstrates progressive activity Outcome: Progressing Towards Goal 
Goal: *Tolerating diet Outcome: Progressing Towards Goal 
Goal: *Describes available resources and support systems Outcome: Progressing Towards Goal 
Goal: *Optimal pain control at patient's stated goal 
Outcome: Progressing Towards Goal 
  
Problem: Pneumonia: Day 4 Goal: Off Pathway (Use only if patient is Off Pathway) Outcome: Progressing Towards Goal 
Goal: Activity/Safety Outcome: Progressing Towards Goal 
Goal: Nutrition/Diet Outcome: Progressing Towards Goal 
Goal: Discharge Planning Outcome: Progressing Towards Goal 
Goal: Medications Outcome: Progressing Towards Goal 
Goal: Respiratory Outcome: Progressing Towards Goal 
Goal: Treatments/Interventions/Procedures Outcome: Progressing Towards Goal 
Goal: Psychosocial 
Outcome: Progressing Towards Goal 
  
Problem: Pneumonia: Discharge Outcomes Goal: *Demonstrates progressive activity Outcome: Progressing Towards Goal 
Goal: *Describes follow-up/return visits to physicians Outcome: Progressing Towards Goal 
Goal: *Tolerating diet Outcome: Progressing Towards Goal 
Goal: *Verbalizes name, dosage, time, side effects, and number of days to continue medications Outcome: Progressing Towards Goal 
Goal: *Influenza immunization Outcome: Progressing Towards Goal 
Goal: *Pneumococcal immunization Outcome: Progressing Towards Goal 
Goal: *Respiratory status at baseline Outcome: Progressing Towards Goal 
Goal: *Vital signs within defined limits Outcome: Progressing Towards Goal 
Goal: *Describes available resources and support systems Outcome: Progressing Towards Goal 
Goal: *Optimal pain control at patient's stated goal 
Outcome: Progressing Towards Goal

## 2021-04-15 NOTE — PROGRESS NOTES
Received notification from bedside RN about patient with regards to: low saturation on NC 5l 
VS: /80, HR 91, RR 20, O2 sat 86% on NC 5 L Intervention given: Non heated high flow ordered, RT to titrate to keep sats > 90%

## 2021-04-15 NOTE — PROGRESS NOTES
Pulmonary, Critical Care, and Sleep Medicine Name: Ellyn Mitchell MRN: [id number] : 1950 Hospital: [facility] Date: 4/15/2021 IMPRESSION:  
· Acute/chronic hypoxic/hypercapnic respiratory failure · Severe COPD - FEV1 0.81 L (25% predicted) 2020, has not seen pulmonary - likely with exacerbation · 3 concerning pulmonary nodules, high risk for malignancy · Wrist pain · Tobacco abuse RECOMMENDATIONS:  
· O2, currently with high requirements · Bronchodilators · Systemic corticosteroids · Empiric antibiotics · Tobacco cessation counseling · Ortho seeing for wrist pain · Will need PET-CT for evaluation of nodules. This can not be done as inpatient. These nodules are not amenable to biopsy, and he is not a surgical candidate. Might be a candidate for SBRT if malignant, but will be challenging with more than one lesion Subjective:  
 
4-15-21:  No events. Has required 8 LPM O2 to keep SpO2 > 90%. Chronic dyspnea unchanged. His real complaint remains the pain in his right wrist. 
 
Initial history: This patient has been seen and evaluated at the request of Dr. Juana Macias for lung masses. Patient is a 65 y.o. male smoker with COPD, presented to the ER today with complaints of wrist pain. However, he was found to be hypoxic into the 70s. A CT was then done which revealed 3 new pulmonary nodules. Patient reports that he has severe dyspnea at baseline having to stop and rest after walking to the mailbox. He is on Trelegy prescribed by his PCP, which helped his dyspnea some though he remains severely symptomatic. No f/c/ns/wt loss. He reports that was recommended to see a pulmonary specialist, but he did not want to do that. Reports that his SpO2 is typically around 90% on room air. Past Medical History:  
Diagnosis Date  Chronic obstructive pulmonary disease ([address]  Diverticulitis  Hypertension  Ill-defined condition   
 kidney stones/ stant placed Past Surgical History:  
Procedure Laterality Date  COLONOSCOPY N/A 11/30/2016 COLONOSCOPY performed by Joe Hua MD at [facility] ENDOSCOPY  COLONOSCOPY,KADI DUARTE/CAUTERGAL  11/30/2016  HX BACK SURGERY    
 plate to upper neck  HX CERVICAL FUSION    
 HX ORTHOPAEDIC    
 left ring finger-trauma  HX ORTHOPAEDIC    
 right index finger  HX ORTHOPAEDIC  12/2/14 Bilateral total knee replacements  HX TONSILLECTOMY  HX UROLOGICAL    
 kidney stone extraction/stent Prior to Admission medications Medication Sig Start Date End Date Taking? Authorizing Provider  
fluticasone-umeclidinium-vilanterol (TRELEGY ELLIPTA) 100-62.5-25 mcg inhaler Take 1 Puff by inhalation daily. 1/30/20  Yes Alban Zhu III, DO  
albuterol (PROVENTIL HFA, VENTOLIN HFA, PROAIR HFA) 90 mcg/actuation inhaler Take 2 Puffs by inhalation every six (6) hours as needed for Wheezing. 10/29/19  Yes Alban Zhu III, DO  
hydroCHLOROthiazide (HYDRODIURIL) 25 mg tablet Take 0.5 Tabs by mouth daily. 1/30/20   Sonia Collar III, DO No Known Allergies Social History Tobacco Use  Smoking status: Current Every Day Smoker Packs/day: 1.50 Years: 50.00 Pack years: 75.00 Types: Cigarettes  Smokeless tobacco: Never Used  Tobacco comment: electronic cigarettes in the past  
Substance Use Topics  Alcohol use: Yes Alcohol/week: 3.0 standard drinks Types: 3 Cans of beer per week Comment: social -3 drinks weekly--rum Family History Problem Relation Age of Onset  No Known Problems Mother  Emphysema Father Current Facility-Administered Medications Medication Dose Route Frequency  cefTRIAXone (ROCEPHIN) 2 g in 0.9% sodium chloride (MBP/ADV) 50 mL MBP  2 g IntraVENous Q24H  
 azithromycin (ZITHROMAX) 500 mg in 0.9% sodium chloride 250 mL (VIAL-MATE)  500 mg IntraVENous Q24H  
 albuterol-ipratropium (DUO-NEB) 2.5 MG-0.5 MG/3 ML  3 mL Nebulization Q6H RT  
 methylPREDNISolone (PF) (SOLU-MEDROL) injection 40 mg  40 mg IntraVENous Q8H  
 hydroCHLOROthiazide (HYDRODIURIL) tablet 12.5 mg  12.5 mg Oral DAILY  sodium chloride (NS) flush 5-40 mL  5-40 mL IntraVENous Q8H  
 enoxaparin (LOVENOX) injection 40 mg  40 mg SubCUTAneous Q24H  
 nicotine (NICODERM CQ) 14 mg/24 hr patch 1 Patch  1 Patch TransDERmal DAILY  arformoteroL (BROVANA) neb solution 15 mcg  15 mcg Nebulization BID RT And  
 budesonide (PULMICORT) 250 mcg/2ml nebulizer susp  250 mcg Nebulization BID RT Review of Systems: A comprehensive review of systems was negative except for that written in the HPI. Objective:  
Vital Signs:   
Visit Vitals /72 Pulse 92 Temp 99 °F (37.2 °C) Resp 20 Ht 5' 10\" (1.778 m) Wt 76.5 kg (168 lb 10.4 oz) SpO2 (P) 93% BMI 24.20 kg/m² O2 Device: Hi flow nasal cannula O2 Flow Rate (L/min): 10 l/min Temp (24hrs), Av.5 °F (36.9 °C), Min:97.3 °F (36.3 °C), Max:99 °F (37.2 °C) Intake/Output:  
Last shift:      No intake/output data recorded. Last 3 shifts:  1901 - 04/15 0700 In: 48 [I.V.:50] Out: - No intake or output data in the 24 hours ending 04/15/21 0931 Physical Exam:  
General:  Alert, cooperative, no distress Head:  Normocephalic, without obvious abnormality, atraumatic. Eyes:  Conjunctivae/corneas clear. .  
Nose: Nares normal. Septum midline. Mucosa normal   
Throat: Lips, mucosa, and tongue normal. Teeth and gums normal.  
Neck: Supple, symmetrical, trachea midline Back:   Symmetric, no curvature. ROM normal.  
Lungs:   Clear to auscultation bilaterally. No wheezing appreciated. Chest wall:  No tenderness or deformity. Heart:  Regular rate and rhythm Abdomen:   Soft, non-tender. Bowel sounds normal.    
Extremities: Extremities normal, atraumatic, no cyanosis Skin: Skin color, texture, turgor normal. No rashes or lesions Lymph nodes: Cervical, supraclavicular nodes normal.  
Neurologic: Grossly nonfocal  
 
Data review:  
 
Recent Results (from the past 24 hour(s)) METABOLIC PANEL, BASIC Collection Time: 04/15/21  4:54 AM  
Result Value Ref Range Sodium 139 136 - 145 mmol/L Potassium 4.0 3.5 - 5.1 mmol/L Chloride 102 97 - 108 mmol/L  
 CO2 36 (H) 21 - 32 mmol/L Anion gap 1 (L) 5 - 15 mmol/L Glucose 75 65 - 100 mg/dL BUN 24 (H) 6 - 20 MG/DL Creatinine 1.10 0.70 - 1.30 MG/DL  
 BUN/Creatinine ratio 22 (H) 12 - 20 GFR est AA >60 >60 ml/min/1.73m2 GFR est non-AA >60 >60 ml/min/1.73m2 Calcium 8.8 8.5 - 10.1 MG/DL  
CBC W/O DIFF Collection Time: 04/15/21  4:54 AM  
Result Value Ref Range WBC 10.7 4.1 - 11.1 K/uL  
 RBC 4.77 4.10 - 5.70 M/uL  
 HGB 15.6 12.1 - 17.0 g/dL HCT 51.1 (H) 36.6 - 50.3 % .1 (H) 80.0 - 99.0 FL  
 MCH 32.7 26.0 - 34.0 PG  
 MCHC 30.5 30.0 - 36.5 g/dL  
 RDW 15.6 (H) 11.5 - 14.5 % PLATELET 802 (L) 407 - 400 K/uL MPV 10.9 8.9 - 12.9 FL  
 NRBC 0.0 0  WBC ABSOLUTE NRBC 0.00 0.00 - 0.01 K/uL Imaging: 
I have personally reviewed the patients radiographs and have reviewed the reports: 
3 new pulmonary nodules (2 in RUL, 1 in RLL); emphysema Carlos Mclaughlin MD

## 2021-04-16 NOTE — PROGRESS NOTES
Ortho: 
 
Right wrist improved- over 50% per pt, \"I still dont have the strength to \" Swelling resolved, No erythema No sig TTP Sign off Re-consult as needed Plan per Dr Calli Meraz Webster Phlegm, PA-C

## 2021-04-16 NOTE — PROGRESS NOTES
Hospitalist Progress Note NAME: Miya Carpenter :  1950 MRN:  [id number] Assessment / Plan: 
Acute respiratory failure with hypoxia most likely secondary to COPD exacerbation versus community-acquired pneumonia New right lung nodules 
-Continue supplemental O2, currently on 8L NC 
-Appreciate Palm consult 
-Bronchodilators 
-Continue IV steroids 
-Continue empiric IV antibiotics 
-We will need PFTs, OP pulm follow-up 
-Wean O2 to maintain sats greater than 90% as able 
-We will need O2 challenge test prior to DC 
-PET/CT of pulmonary nodules which are not amenable to biopsy CTA chest  1. No evidence for pulmonary embolism. 2. Emphysema with 3 new right lung nodules, concerning for bronchogenic 
carcinoma. Further evaluation recommended with PET/CT. 2. Prior cervical fusion. Tobacco use 
-S/p cessation counseling Right wrist pain 
-Swelling right wrist noted and this is his main complaint 
-Not particularly painful, possibly related to ongoing DJD 
-X-ray no acute fracture 
-Ultrasound of the upper extremity also negative for DVT 
-Rest, compression, elevation 
-On IV steroids for suspected AE COPD exacerbation and wrist seems to be improving so far 
-Appreciate Ortho consult HTN Resume hydrochlorothiazide, add as needed labetalol Code Status: Full code Surrogate Decision Maker: Wife 
  
DVT Prophylaxis: Lovenox GI Prophylaxis: not indicated 
  
Baseline: Ambulatory Subjective: Chief Complaint / Reason for Physician Visit Main complaint still right wrist swelling, improved now. Denies fever/chills/chest pain Discussed with RN events overnight. Review of Systems: 
Symptom Y/N Comments  Symptom Y/N Comments Fever/Chills n   Chest Pain n   
Poor Appetite n   Edema Cough y   Abdominal Pain n   
Sputum n   Joint Pain SOB/ROSARIO n   Pruritis/Rash Nausea/vomit n   Tolerating PT/OT Diarrhea    Tolerating Diet y Constipation    Other Could NOT obtain due to:   
 
Objective: VITALS:  
Last 24hrs VS reviewed since prior progress note. Most recent are: 
Patient Vitals for the past 24 hrs: 
 Temp Pulse Resp BP SpO2  
04/15/21 1940     94 % 04/15/21 1938 98.7 °F (37.1 °C) 91 18 111/73 93 % 04/15/21 1708     94 % 04/15/21 1600  85     
04/15/21 1559     91 % 04/15/21 1459 98.9 °F (37.2 °C) 83 18 114/71 92 % 04/15/21 1200  86     
04/15/21 1046 99 °F (37.2 °C) 78 18 125/82 (!) 88 % 04/15/21 0901     93 % 04/15/21 0759     92 % 04/15/21 0718 99 °F (37.2 °C) 92 20 121/72 92 % 04/15/21 0448     94 % 04/15/21 0409 98.8 °F (37.1 °C) 91 20 123/80 (!) 86 % 04/14/21 2359 98.5 °F (36.9 °C) 73 20 136/73 91 % No intake or output data in the 24 hours ending 04/15/21 2310 I had a face to face encounter and independently examined this patient on 4/15/2021, as outlined below: PHYSICAL EXAM: 
General: WD, WN. Alert, cooperative, no acute distress EENT:  EOMI. Anicteric sclerae. MMM Resp:  CTA bilaterally, diminished air entry b/l fields. No accessory muscle use CV:  Regular  rhythm,  No edema GI:  Soft, Non distended, Non tender. +Bowel sounds Neurologic:  Alert and oriented X 3, normal speech, Psych:   Good insight. Not anxious nor agitated Skin:  No rashes. No jaundice Reviewed most current lab test results and cultures  YES Reviewed most current radiology test results   YES Review and summation of old records today    NO Reviewed patient's current orders and MAR    YES 
PMH/SH reviewed - no change compared to H&P 
________________________________________________________________________ Care Plan discussed with: 
  Comments Patient x Family  x wife RN x Care Manager Consultant     
                 x Multidiciplinary team rounds were held today with , nursing, pharmacist and clinical coordinator.   Patient's plan of care was discussed; medications were reviewed and discharge planning was addressed. ________________________________________________________________________ Total NON critical care TIME: 35   Minutes Total CRITICAL CARE TIME Spent:   Minutes non procedure based Comments >50% of visit spent in counseling and coordination of care x   
________________________________________________________________________ Tabatha Milan DO  
 
Procedures: see electronic medical records for all procedures/Xrays and details which were not copied into this note but were reviewed prior to creation of Plan. LABS: 
I reviewed today's most current labs and imaging studies. Pertinent labs include: 
Recent Labs 04/15/21 
8139 04/14/21 
9528 WBC 10.7 7.8 HGB 15.6 15.8 HCT 51.1* 49.8 * 152 Recent Labs 04/15/21 
2404 04/14/21 
4746  143  
K 4.0 3.5  107 CO2 36* 33* GLU 75 121* BUN 24* 19  
CREA 1.10 1.07  
CA 8.8 8.4* ALB  --  3.2* TBILI  --  0.6 ALT  --  27 Signed: Tabatha Milan DO

## 2021-04-16 NOTE — PROGRESS NOTES
Hospitalist Progress Note NAME: Meir Miguel :  1950 MRN:  [id number] Assessment / Plan: 
Acute respiratory failure with hypoxia most likely secondary to COPD exacerbation versus community-acquired pneumonia New right lung nodules 
-Continue supplemental O2, O2 weaned to 4L from 8L 
- consult 
-Bronchodilators 
-Continue IV steroids 
-Continue empiric IV antibiotics 
-We will need PFTs, OP pulm follow-up 
-Wean O2 to maintain sats greater than 90% as able 
-We will need O2 challenge test prior to DC 
-PET/CT of pulmonary nodules which are not amenable to biopsy (to be done as outpatient) CTA chest  1. No evidence for pulmonary embolism. 2. Emphysema with 3 new right lung nodules, concerning for bronchogenic 
carcinoma. Further evaluation recommended with PET/CT. 2. Prior cervical fusion. Tobacco use 
-S/p cessation counseling Right wrist pain 
-Swelling right wrist noted and this is his main complaint 
-Not particularly painful, possibly related to ongoing DJD 
-X-ray no acute fracture 
-Ultrasound of the upper extremity also negative for DVT 
-Rest, compression, elevation 
-On IV steroids for suspected AE COPD exacerbation and wrist seems to be improving so far 
-Appreciate Ortho consult HTN Resume hydrochlorothiazide, add as needed labetalol Code Status: Full code Surrogate Decision Maker: Wife 
  
DVT Prophylaxis: Lovenox GI Prophylaxis: not indicated 
  
Baseline: Ambulatory Subjective: Chief Complaint / Reason for Physician Visit Feels breathing is a little better. Not much cough/sputum production Discussed with RN events overnight. Review of Systems: 
Symptom Y/N Comments  Symptom Y/N Comments Fever/Chills n   Chest Pain n   
Poor Appetite n   Edema Cough y   Abdominal Pain n   
Sputum n   Joint Pain SOB/ROSARIO n   Pruritis/Rash Nausea/vomit n   Tolerating PT/OT Diarrhea    Tolerating Diet y Constipation Other Could NOT obtain due to:   
 
Objective: VITALS:  
Last 24hrs VS reviewed since prior progress note. Most recent are: 
Patient Vitals for the past 24 hrs: 
 Temp Pulse Resp BP SpO2  
04/16/21 1134 98.6 °F (37 °C) 99 20 119/67 94 % 04/16/21 1028     91 % 04/16/21 1007     95 % 04/16/21 0813 98.7 °F (37.1 °C) 93 18 (!) 136/91 (!) 89 % 04/16/21 0812     (!) 87 % 04/16/21 0807  92  133/85   
04/16/21 0800  93     
04/16/21 0759  95  133/85 90 % 04/16/21 0256     95 % 04/16/21 0243 97.7 °F (36.5 °C) 95 18 128/67 95 % 04/15/21 2311 97.7 °F (36.5 °C) 100 18 (!) 145/72 94 % 04/15/21 1940     94 % 04/15/21 1938 98.7 °F (37.1 °C) 91 18 111/73 93 % 04/15/21 1708     94 % 04/15/21 1600  85     
04/15/21 1559     91 % 04/15/21 1459 98.9 °F (37.2 °C) 83 18 114/71 92 % No intake or output data in the 24 hours ending 04/16/21 1327 I had a face to face encounter and independently examined this patient on 4/16/2021, as outlined below: PHYSICAL EXAM: 
General: WD, WN. Alert, cooperative, no acute distress EENT:  EOMI. Anicteric sclerae. MMM Resp:  CTA bilaterally, diminished air entry b/l fields. No accessory muscle use CV:  Regular  rhythm,  No edema GI:  Soft, Non distended, Non tender. +Bowel sounds Neurologic:  Alert and oriented X 3, normal speech, Psych:   Good insight. Not anxious nor agitated Skin:  No rashes. No jaundice Reviewed most current lab test results and cultures  YES Reviewed most current radiology test results   YES Review and summation of old records today    NO Reviewed patient's current orders and MAR    YES 
PMH/SH reviewed - no change compared to H&P 
________________________________________________________________________ Care Plan discussed with: 
  Comments Patient x Family RN x Care Manager Consultant     
                 x Multidiciplinary team rounds were held today with case manager, nursing, pharmacist and clinical coordinator. Patient's plan of care was discussed; medications were reviewed and discharge planning was addressed. ________________________________________________________________________ Total NON critical care TIME: 30   Minutes Total CRITICAL CARE TIME Spent:   Minutes non procedure based Comments >50% of visit spent in counseling and coordination of care x   
________________________________________________________________________ Yohana Tate DO  
 
Procedures: see electronic medical records for all procedures/Xrays and details which were not copied into this note but were reviewed prior to creation of Plan. LABS: 
I reviewed today's most current labs and imaging studies. Pertinent labs include: 
Recent Labs 04/16/21 
1465 04/15/21 
9733 04/14/21 
3007 WBC 9.1 10.7 7.8 HGB 15.0 15.6 15.8 HCT 48.2 51.1* 49.8 * 147* 152 Recent Labs 04/16/21 
3638 04/15/21 
3863 04/14/21 
5957  139 143  
K 4.2 4.0 3.5  102 107 CO2 37* 36* 33* * 75 121* BUN 24* 24* 19  
CREA 0.96 1.10 1.07  
CA 8.7 8.8 8.4* ALB  --   --  3.2* TBILI  --   --  0.6 ALT  --   --  27 Signed: Yohana Tate DO

## 2021-04-16 NOTE — PROGRESS NOTES
End of Shift Note 
  Bedside shift change report given to Yovanny Sexton RN (oncoming nurse) by Demian Yu RN (offgoing nurse). Report included the following information SBAR, Kardex, ED Summary, Intake/Output, MAR, Accordion, Recent Results and Med Rec Status 
  
Shift worked: [employer] Shift summary and any significant changes:  
  none Concerns for physician to address: none Zone phone for oncoming shift:   [phone]  
  
Patient Information Marie Milan 
65 y.o. 
4/14/2021  2:45 AM by Devan Franks MD. Marie Milna was admitted from Home 
  
Problem List 
     
Patient Active Problem List  
  Diagnosis Date Noted  Tobacco abuse disorder 11/28/2016  
    Priority: 3 - Three  
    Class: Chronic  Alcohol use 11/28/2016  
    Priority: 3 - Three  
    Class: Chronic  Long term (current) use of non-steroidal anti-inflammatories (nsaid) 11/28/2016  
    Priority: 3 - Three  
    Class: Present on Admission  Acute respiratory failure with hypoxia ([facility] [address] 04/14/2021  Lung mass 04/14/2021  Elevated PSA, less than 10 ng/ml 10/16/2017  Essential hypertension 10/16/2017  Herpes zoster without complication 65/91/4979  
 SOB (shortness of breath) on exertion 01/13/2017  Diverticulosis 12/08/2016  Diverticulosis of colon with hemorrhage 12/06/2016  
    Class: Present on Admission  DJD (degenerative joint disease) of knee 12/02/2014  Primary localized osteoarthrosis, lower leg 12/02/2014  
  
    
Past Medical History:  
Diagnosis Date  Chronic obstructive pulmonary disease (HCC)    
 Diverticulitis    
 Hypertension    
 Ill-defined condition    
  kidney stones/ stant placed  
  
  
Core Measures: PNA: YES 
  
Activity: 
Activity Level: Up with Assistance Number times ambulated in hallways past shift: 1 
  
Cardiac:  
Cardiac Monitoring: Yes     
Cardiac Rhythm: NSR, Sinus tachycardia 
  
Access:  
Current line(s): PIV  
  
Genitourinary:  
Urinary status: Voiding 
  Respiratory:  
O2 Device: Nasal cannula Chronic home O2 use?: NO 
  
GI: 
Current diet:  DIET CARDIAC Regular Passing flatus: YES Tolerating current diet: YES 
  
Pain Management:  
Patient states pain is manageable on current regimen: YES 
  
Skin: 
Ender Score: 20 Interventions: increase time out of bed Patient Safety: 
Fall Score: Total Score: 1 Interventions: bed/chair alarm, assistive device (walker, cane, etc), gripper socks and pt to call before getting OOB 
  
DVT prophylaxis: DVT prophylaxis Med- Yes DVT prophylaxis SCD or LEONID- No  
  
Wounds: (If Applicable) Wounds- No 
  
Active Consults: 
IP CONSULT TO ORTHOPEDIC SURGERY 
IP CONSULT TO PULMONOLOGY 
  
Length of Stay: 
Expected LOS: - - - Actual LOS: 1d 
Discharge Plan: No, JAZMYNE

## 2021-04-16 NOTE — PROGRESS NOTES
Pulmonary, Critical Care, and Sleep Medicine Name: Irvin Esqueda MRN: [id number] : 1950 Hospital: [facility] Date: 2021 IMPRESSION:  
· Acute/chronic hypoxic/hypercapnic respiratory failure · Severe COPD - FEV1 0.81 L (25% predicted) 2020, has not seen pulmonary - likely with exacerbation · 3 concerning pulmonary nodules, high risk for malignancy · Wrist pain · Tobacco abuse RECOMMENDATIONS:  
· O2, currently with high requirements · Bronchodilators · Systemic corticosteroids · Empiric antibiotics · Tobacco cessation counseling · Ortho seeing for wrist pain · Will need PET-CT for evaluation of nodules. This can not be done as inpatient. These nodules are not amenable to biopsy, and he is not a surgical candidate. Might be a candidate for SBRT if malignant, but will be challenging with more than one lesion Subjective:  
 
21: Has had to go up to 11 LPM O2 this morning. Remains essentially asymptomatic (baseline dyspnea unchanged). 4-15-21:  No events. Has required 8 LPM O2 to keep SpO2 > 90%. Chronic dyspnea unchanged. His real complaint remains the pain in his right wrist. 
 
Initial history: This patient has been seen and evaluated at the request of Dr. Yasmin Khan for lung masses. Patient is a 65 y.o. male smoker with COPD, presented to the ER today with complaints of wrist pain. However, he was found to be hypoxic into the 70s. A CT was then done which revealed 3 new pulmonary nodules. Patient reports that he has severe dyspnea at baseline having to stop and rest after walking to the mailbox. He is on Trelegy prescribed by his PCP, which helped his dyspnea some though he remains severely symptomatic. No f/c/ns/wt loss. He reports that was recommended to see a pulmonary specialist, but he did not want to do that. Reports that his SpO2 is typically around 90% on room air. Past Medical History:  
Diagnosis Date  Chronic obstructive pulmonary disease (HCC)  Diverticulitis  Hypertension  Ill-defined condition   
 kidney stones/ stant placed Past Surgical History:  
Procedure Laterality Date  COLONOSCOPY N/A 11/30/2016 COLONOSCOPY performed by Allegra Pearson MD at [facility] ENDOSCOPY  COLONOSCOPY,KADI DUARTE/CAUTERY  11/30/2016  HX BACK SURGERY    
 plate to upper neck  HX CERVICAL FUSION    
 HX ORTHOPAEDIC    
 left ring finger-trauma  HX ORTHOPAEDIC    
 right index finger  HX ORTHOPAEDIC  12/2/14 Bilateral total knee replacements  HX TONSILLECTOMY  HX UROLOGICAL    
 kidney stone extraction/stent Prior to Admission medications Medication Sig Start Date End Date Taking? Authorizing Provider  
fluticasone-umeclidinium-vilanterol (TRELEGY ELLIPTA) 100-62.5-25 mcg inhaler Take 1 Puff by inhalation daily. 1/30/20  Yes Alban Zhu III, DO  
albuterol (PROVENTIL HFA, VENTOLIN HFA, PROAIR HFA) 90 mcg/actuation inhaler Take 2 Puffs by inhalation every six (6) hours as needed for Wheezing. 10/29/19  Yes Alban Zhu III, DO  
hydroCHLOROthiazide (HYDRODIURIL) 25 mg tablet Take 0.5 Tabs by mouth daily. 1/30/20   Robyn Ibarra III, DO No Known Allergies Social History Tobacco Use  Smoking status: Current Every Day Smoker Packs/day: 1.50 Years: 50.00 Pack years: 75.00 Types: Cigarettes  Smokeless tobacco: Never Used  Tobacco comment: electronic cigarettes in the past  
Substance Use Topics  Alcohol use: Yes Alcohol/week: 3.0 standard drinks Types: 3 Cans of beer per week Comment: social -3 drinks weekly--rum Family History Problem Relation Age of Onset  No Known Problems Mother  Emphysema Father Current Facility-Administered Medications Medication Dose Route Frequency  methylPREDNISolone (PF) (SOLU-MEDROL) injection 80 mg  80 mg IntraVENous Q8H  
 cefTRIAXone (ROCEPHIN) 2 g in 0.9% sodium chloride (MBP/ADV) 50 mL MBP  2 g IntraVENous Q24H  
 albuterol-ipratropium (DUO-NEB) 2.5 MG-0.5 MG/3 ML  3 mL Nebulization Q6H RT  
 hydroCHLOROthiazide (HYDRODIURIL) tablet 12.5 mg  12.5 mg Oral DAILY  sodium chloride (NS) flush 5-40 mL  5-40 mL IntraVENous Q8H  
 enoxaparin (LOVENOX) injection 40 mg  40 mg SubCUTAneous Q24H  
 nicotine (NICODERM CQ) 14 mg/24 hr patch 1 Patch  1 Patch TransDERmal DAILY  arformoteroL (BROVANA) neb solution 15 mcg  15 mcg Nebulization BID RT And  
 budesonide (PULMICORT) 250 mcg/2ml nebulizer susp  250 mcg Nebulization BID RT Review of Systems: A comprehensive review of systems was negative except for that written in the HPI. Objective:  
Vital Signs:   
Visit Vitals BP (!) 136/91 (BP 1 Location: Left arm, BP Patient Position: Sitting) Pulse 93 Temp 98.7 °F (37.1 °C) Resp 18 Ht 5' 10\" (1.778 m) Wt 76.5 kg (168 lb 10.4 oz) SpO2 (!) 89% BMI 24.20 kg/m² O2 Device: Hi flow nasal cannula O2 Flow Rate (L/min): 8 l/min Temp (24hrs), Av.5 °F (36.9 °C), Min:97.7 °F (36.5 °C), Max:99 °F (37.2 °C) Intake/Output:  
Last shift:      No intake/output data recorded. Last 3 shifts: No intake/output data recorded. No intake or output data in the 24 hours ending 21 0903 Physical Exam:  
General:  Alert, cooperative, no distress Head:  Normocephalic, without obvious abnormality, atraumatic. Eyes:  Conjunctivae/corneas clear. .  
Nose: Nares normal. Septum midline. Mucosa normal   
Throat: Lips, mucosa, and tongue normal. Teeth and gums normal.  
Neck: Supple, symmetrical, trachea midline Back:   Symmetric, no curvature. ROM normal.  
Lungs:   Clear to auscultation bilaterally. No wheezing appreciated. Chest wall:  No tenderness or deformity. Heart:  Regular rate and rhythm Abdomen:   Soft, non-tender. Bowel sounds normal.    
Extremities: Extremities normal, atraumatic, no cyanosis Skin: Skin color, texture, turgor normal. No rashes or lesions Lymph nodes: Cervical, supraclavicular nodes normal.  
Neurologic: Grossly nonfocal  
 
Data review:  
 
Recent Results (from the past 24 hour(s)) CBC W/O DIFF Collection Time: 04/16/21  5:31 AM  
Result Value Ref Range WBC 9.1 4.1 - 11.1 K/uL  
 RBC 4.58 4.10 - 5.70 M/uL  
 HGB 15.0 12.1 - 17.0 g/dL HCT 48.2 36.6 - 50.3 % .2 (H) 80.0 - 99.0 FL  
 MCH 32.8 26.0 - 34.0 PG  
 MCHC 31.1 30.0 - 36.5 g/dL  
 RDW 15.3 (H) 11.5 - 14.5 % PLATELET 678 (L) 748 - 400 K/uL MPV 10.9 8.9 - 12.9 FL  
 NRBC 0.0 0  WBC ABSOLUTE NRBC 0.00 0.00 - 0.01 K/uL METABOLIC PANEL, BASIC Collection Time: 04/16/21  5:31 AM  
Result Value Ref Range Sodium 139 136 - 145 mmol/L Potassium 4.2 3.5 - 5.1 mmol/L Chloride 101 97 - 108 mmol/L  
 CO2 37 (H) 21 - 32 mmol/L Anion gap 1 (L) 5 - 15 mmol/L Glucose 144 (H) 65 - 100 mg/dL BUN 24 (H) 6 - 20 MG/DL Creatinine 0.96 0.70 - 1.30 MG/DL  
 BUN/Creatinine ratio 25 (H) 12 - 20 GFR est AA >60 >60 ml/min/1.73m2 GFR est non-AA >60 >60 ml/min/1.73m2 Calcium 8.7 8.5 - 10.1 MG/DL Imaging: 
I have personally reviewed the patients radiographs and have reviewed the reports: 
3 new pulmonary nodules (2 in RUL, 1 in RLL); emphysema Kennedy Kauffman MD

## 2021-04-17 NOTE — PROGRESS NOTES
Hospitalist Progress Note NAME: Amandeep Rodriguez :  1950 MRN:  [id number] Assessment / Plan: 
Acute respiratory failure with hypoxia most likely secondary to COPD exacerbation versus community-acquired pneumonia New right lung nodules 
-Continue supplemental O2, wean as tolerated to maintain sats > 90 
-currently requiring 5L NC 
-Appreciate Palm consult 
-Bronchodilators 
-Continue IV steroids 
-Continue empiric IV antibiotics 
-We will need PFTs, OP pulm follow-up 
-Wean O2 to maintain sats greater than 90% as able 
-We will need O2 challenge test prior to DC 
-PET/CT of pulmonary nodules which are not amenable to biopsy CTA chest  1. No evidence for pulmonary embolism. 2. Emphysema with 3 new right lung nodules, concerning for bronchogenic 
carcinoma. Further evaluation recommended with PET/CT. 2. Prior cervical fusion. Tobacco use 
-S/p cessation counseling 
-cont nicotine replacement Right wrist pain 
-Swelling right wrist noted and this is his main complaint 
-Not particularly painful, possibly related to ongoing DJD 
-X-ray no acute fracture 
-Ultrasound of the upper extremity also negative for DVT 
-Rest, compression, elevation 
-On IV steroids for suspected AE COPD exacerbation and wrist seems to be improving so far 
-Appreciate Ortho consult HTN Resume hydrochlorothiazide, add as needed labetalol Code Status: Full code Surrogate Decision Maker: Wife 
  
DVT Prophylaxis: Lovenox GI Prophylaxis: not indicated 
  
Baseline: Ambulatory Subjective: Chief Complaint / Reason for Physician Visit No new complaints. SOB stable. Wants to know when he will be able to go home, discussed need to determine his O2 needs prior to DC, treat his COPD Discussed with RN events overnight. Review of Systems: 
Symptom Y/N Comments  Symptom Y/N Comments Fever/Chills n   Chest Pain n   
Poor Appetite n   Edema Cough y   Abdominal Pain n   
Sputum n   Joint Pain SOB/ROSARIO n   Pruritis/Rash Nausea/vomit n   Tolerating PT/OT Diarrhea    Tolerating Diet y Constipation    Other Could NOT obtain due to:   
 
Objective: VITALS:  
Last 24hrs VS reviewed since prior progress note. Most recent are: 
Patient Vitals for the past 24 hrs: 
 Temp Pulse Resp BP SpO2  
04/17/21 0816 97.8 °F (36.6 °C) 92 20 132/79 91 % 04/17/21 0730     98 % 04/17/21 0336 97.4 °F (36.3 °C) 88 20 129/75 93 % 04/16/21 2346 98.2 °F (36.8 °C) 86 20 132/82 93 % 04/16/21 2054     92 % 04/16/21 2052 98.2 °F (36.8 °C) 90 20 131/79 91 % 04/16/21 1458 97.8 °F (36.6 °C) 95 20 105/62   
04/16/21 1454     95 % 04/16/21 1417     94 % 04/16/21 1134 98.6 °F (37 °C) 99 20 119/67 94 % No intake or output data in the 24 hours ending 04/17/21 1107 I had a face to face encounter and independently examined this patient on 4/17/2021, as outlined below: PHYSICAL EXAM: 
General: WD, WN. Alert, cooperative, no acute distress EENT:  EOMI. Anicteric sclerae. MMM Resp:  CTA bilaterally, diminished air entry b/l fields. No accessory muscle use CV:  Regular  rhythm,  No edema GI:  Soft, Non distended, Non tender. +Bowel sounds Neurologic:  Alert and oriented X 3, normal speech, Psych:   Good insight. Not anxious nor agitated Skin:  No rashes. No jaundice Reviewed most current lab test results and cultures  YES Reviewed most current radiology test results   YES Review and summation of old records today    NO Reviewed patient's current orders and MAR    YES 
PMH/SH reviewed - no change compared to H&P 
________________________________________________________________________ Care Plan discussed with: 
  Comments Patient x Family  x wife RN x Care Manager Consultant     
                 x Multidiciplinary team rounds were held today with , nursing, pharmacist and clinical coordinator.   Patient's plan of care was discussed; medications were reviewed and discharge planning was addressed. ________________________________________________________________________ Total NON critical care TIME: 35   Minutes Total CRITICAL CARE TIME Spent:   Minutes non procedure based Comments >50% of visit spent in counseling and coordination of care x   
________________________________________________________________________ Erma Rodriguez DO  
 
Procedures: see electronic medical records for all procedures/Xrays and details which were not copied into this note but were reviewed prior to creation of Plan. LABS: 
I reviewed today's most current labs and imaging studies. Pertinent labs include: 
Recent Labs 04/16/21 
 04/15/21 
5270 WBC 9.1 10.7 HGB 15.0 15.6 HCT 48.2 51.1*  
* 147* Recent Labs 04/16/21 
 04/15/21 
2217  139  
K 4.2 4.0  
 102 CO2 37* 36* * 75 BUN 24* 24* CREA 0.96 1.10 CA 8.7 8.8 Signed: Erma Rodriguez DO

## 2021-04-17 NOTE — PROGRESS NOTES
Bedside shift change report given Ania Causey RN (oncoming nurse) Mallory Hanks RN (offgoing nurse).  Report included the following information SBAR, Kardex, Procedure Summary, Intake/Output, MAR, Recent Results, Med Rec Status and Cardiac Rhythm shift events

## 2021-04-17 NOTE — PROGRESS NOTES
End of Shift Note 
  Bedside shift change report given to Justyna Rodriguez, RN (oncoming nurse) by Cesilia Desouza RN (offgoing nurse). Report included the following information SBAR, Kardex, ED Summary, Intake/Output, MAR, Accordion, Recent Results and Med Rec Status 
  
Shift worked: [employer] Shift summary and any significant changes:  
  none Concerns for physician to address: none Zone phone for oncoming shift:   [phone]  
  
Patient Information Yuni Richardson 
65 y.o. 
4/14/2021  2:45 AM by Justyn Mason MD. Yuni Richardson was admitted from Home 
  
Problem List 
     
Patient Active Problem List  
  Diagnosis Date Noted  Tobacco abuse disorder 11/28/2016  
    Priority: 3 - Three  
    Class: Chronic  Alcohol use 11/28/2016  
    Priority: 3 - Three  
    Class: Chronic  Long term (current) use of non-steroidal anti-inflammatories (nsaid) 11/28/2016  
    Priority: 3 - Three  
    Class: Present on Admission  Acute respiratory failure with hypoxia ([address] 04/14/2021  Lung mass 04/14/2021  Elevated PSA, less than 10 ng/ml 10/16/2017  Essential hypertension 10/16/2017  Herpes zoster without complication 22/49/5087  
 SOB (shortness of breath) on exertion 01/13/2017  Diverticulosis 12/08/2016  Diverticulosis of colon with hemorrhage 12/06/2016  
    Class: Present on Admission  DJD (degenerative joint disease) of knee 12/02/2014  Primary localized osteoarthrosis, lower leg 12/02/2014  
  
    
Past Medical History:  
Diagnosis Date  Chronic obstructive pulmonary disease (HCC)    
 Diverticulitis    
 Hypertension    
 Ill-defined condition    
  kidney stones/ stant placed  
  
  
Core Measures: PNA: YES 
  
Activity: 
Activity Level: Up with Assistance Number times ambulated in hallways past shift: 1 
  
Cardiac:  
Cardiac Monitoring: Yes     
Cardiac Rhythm: NSR, Sinus tachycardia 
  
Access:  
Current line(s): PIV  
  
Genitourinary:  
Urinary status: Voiding 
  Respiratory:  
O2 Device: Nasal cannula Chronic home O2 use?: NO 
  
GI: 
Current diet:  DIET CARDIAC Regular Passing flatus: YES Tolerating current diet: YES 
  
Pain Management:  
Patient states pain is manageable on current regimen: YES 
  
Skin: 
Ender Score: 20 Interventions: increase time out of bed Patient Safety: 
Fall Score: Total Score: 1 Interventions: bed/chair alarm, assistive device (walker, cane, etc), gripper socks and pt to call before getting OOB 
  
DVT prophylaxis: DVT prophylaxis Med- Yes DVT prophylaxis SCD or LEONID- No  
  
Wounds: (If Applicable) Wounds- No 
  
Active Consults: 
IP CONSULT TO ORTHOPEDIC SURGERY 
IP CONSULT TO PULMONOLOGY 
  
Length of Stay: 
Expected LOS: - - - Actual LOS: 1d 
Discharge Plan: No, JAZMYNE

## 2021-04-18 NOTE — PROGRESS NOTES
Bedside shift change report given Paula Bernstein RN (oncoming nurse) Edilberto Neal RN (offgoing nurse).  Report included the following information SBAR, Kardex, Procedure Summary, Intake/Output, MAR, Recent Results, Med Rec Status and Cardiac Rhythm shift events

## 2021-04-18 NOTE — PROGRESS NOTES
End of Shift Note Bedside shift change report given to Eliseo Gonzalez RN (oncoming nurse) by Hilaria Perez (offgoing nurse). Report included the following information SBAR, Kardex and Intake/Output Shift worked:  nights Shift summary and any significant changes:  
  sats looking good. Trending down to 3 L. Concerns for physician to address:  n/a  
Zone phone for oncoming shift:  1953 Patient Information Subhash Claros 
65 y.o. 
4/14/2021  2:45 AM by Vineet Calles MD. Subhash Claros was admitted from Home 
 
Problem List 
Patient Active Problem List  
 Diagnosis Date Noted  Tobacco abuse disorder 11/28/2016 Priority: 3 - Three Class: Chronic  Alcohol use 11/28/2016 Priority: 3 - Three Class: Chronic  Long term (current) use of non-steroidal anti-inflammatories (nsaid) 11/28/2016 Priority: 3 - Three Class: Present on Admission  Acute respiratory failure with hypoxia ([facility] [address] 04/14/2021  Lung mass 04/14/2021  Elevated PSA, less than 10 ng/ml 10/16/2017  Essential hypertension 10/16/2017  Herpes zoster without complication 56/57/2479  
 SOB (shortness of breath) on exertion 01/13/2017  Diverticulosis 12/08/2016  Diverticulosis of colon with hemorrhage 12/06/2016 Class: Present on Admission  DJD (degenerative joint disease) of knee 12/02/2014  Primary localized osteoarthrosis, lower leg 12/02/2014 Past Medical History:  
Diagnosis Date  Chronic obstructive pulmonary disease ([address]  Diverticulitis  Hypertension  Ill-defined condition   
 kidney stones/ stant placed Core Measures: CVA: No No 
CHF:No No 
PNA:No No 
 
Activity: 
Activity Level: Up ad cheko Number times ambulated in hallways past shift: 0 Number of times OOB to chair past shift: 1 Cardiac:  
Cardiac Monitoring: Yes     
Cardiac Rhythm: Normal sinus rhythm Genitourinary:  
Urinary status: voiding Urinary Catheter?  No  
 
Respiratory:  
O2 Device: Nasal cannula Chronic home O2 use?: YES Incentive spirometer at bedside: NO 
  
 
GI: 
Last Bowel Movement Date: 04/14/21 Current diet:  DIET CARDIAC Regular Passing flatus: YES Tolerating current diet: YES Pain Management:  
Patient states pain is manageable on current regimen: YES Skin: 
Ender Score: 21 Interventions: increase time out of bed Patient Safety: 
Fall Score: Total Score: 1 Interventions: pt to call before getting OOB 
  
@Rollbelt 
@dexterity to release roll belt  Yes/No ( must document dexterity  here by stating Yes or No here, otherwise this is a restraint and must follow restraint documentation policy.) DVT prophylaxis: DVT prophylaxis Med- No 
DVT prophylaxis SCD or LEONID- No  
 
Wounds: (If Applicable) Wounds- No 
 
 
Active Consults: 
IP CONSULT TO ORTHOPEDIC SURGERY 
IP CONSULT TO PULMONOLOGY Length of Stay: 
Expected LOS: 3d 14h Actual LOS: 4 Discharge Plan:  yes Chen Restrepo

## 2021-04-18 NOTE — PROGRESS NOTES
Problem: Chronic Obstructive Pulmonary Disease (COPD) Goal: *Absence of hypoxia Outcome: Progressing Towards Goal 
  
Problem: Breathing Pattern - Ineffective Goal: *Absence of hypoxia Outcome: Progressing Towards Goal 
Goal: *Use of effective breathing techniques Outcome: Progressing Towards Goal

## 2021-04-19 NOTE — DISCHARGE SUMMARY
Hospitalist Discharge Summary Patient ID: 
Meir Miguel [id number] 
86 y.o. 
1950 4/14/2021 PCP on record: Adelaida Padilla DO Admit date: 4/14/2021 Discharge date and time: 4/19/2021 DISCHARGE DIAGNOSIS: 
 
See below CONSULTATIONS: 
IP CONSULT TO ORTHOPEDIC SURGERY 
IP CONSULT TO PULMONOLOGY Excerpted HPI from H&P of Ra Patterson MD: 
Madelyn Rojo is a 65 y.o.  male with past medical history of COPD, tobacco abuse hypertension who presents with right wrist pain that started yesterday, worse today and not relieved by Tylenol. Patient reported being short of breath for the last few months, especially upon exertion, and cannot walk far without being short of breath . patient denies any chest pain, palpitation, lower extremity edema Upon presentation to the ED, patient was found to be hypoxic in the 70s, hypertensive and wheezy. Further work-up in the ED showed normal CBC, mildly elevated proBNP. Chest x-ray was showing mild airspace disease on the right upper lobe, subsequent CTA of the chest 
Showed 1. No evidence for pulmonary embolism. 2. Emphysema with 3 new right lung nodules, concerning for bronchogenic 
carcinoma. Further evaluation recommended with PET/CT. 2. Prior cervical fusion. ______________________________________________________________________ DISCHARGE SUMMARY/HOSPITAL COURSE:  for full details see H&P, daily progress notes, labs, consult notes. Acute respiratory failure with hypoxia most likely secondary to COPD exacerbation versus community-acquired pneumonia New right lung nodules 
-Continue supplemental O2, wean as tolerated to maintain sats > 90 
-still requiring up to 5L NC 
-Appreciate Palm consult 
-Bronchodilators 
-Continue IV steroids 
-Continue empiric IV antibiotics 
-pulm follow-up on DC 
-Wean O2 to maintain sats greater than 90% as able 
-We will need O2 challenge test prior to DC 
-PET/CT of pulmonary nodules which are not amenable to biopsy to be done as outpatient 
  
CTA chest 4/14 1. No evidence for pulmonary embolism. 2. Emphysema with 3 new right lung nodules, concerning for bronchogenic 
carcinoma. Further evaluation recommended with PET/CT. 2. Prior cervical fusion. 
  
Tobacco use 
-S/p cessation counseling 
-cont nicotine replacement 
  
Right wrist pain 
-Swelling right wrist noted and this is his main complaint 
-Not particularly painful, possibly related to ongoing DJD 
-X-ray no acute fracture 
-Ultrasound of the upper extremity also negative for DVT 
-Rest, compression, elevation 
-On IV steroids for suspected AE COPD exacerbation and wrist seems to be improving so far 
-Appreciate Ortho consult 
  
HTN Resume hydrochlorothiazide, add as needed labetalol 
 
_______________________________________________________________________ Patient seen and examined by me on discharge day. Pertinent Findings: 
Gen:    Not in distress Chest: Clear lungs CVS:   Regular rhythm. No edema Abd:  Soft, not distended, not tender Neuro:  Alert, oriented x 3 
_______________________________________________________________________ DISCHARGE MEDICATIONS:  
Current Discharge Medication List  
  
START taking these medications Details  
nicotine (NICODERM CQ) 14 mg/24 hr patch 1 Patch by TransDERmal route daily for 30 days. Qty: 30 Patch, Refills: 0 CONTINUE these medications which have NOT CHANGED Details  
fluticasone-umeclidinium-vilanterol (TRELEGY ELLIPTA) 100-62.5-25 mcg inhaler Take 1 Puff by inhalation daily. Qty: 2 Inhaler, Refills: 0  
  
albuterol (PROVENTIL HFA, VENTOLIN HFA, PROAIR HFA) 90 mcg/actuation inhaler Take 2 Puffs by inhalation every six (6) hours as needed for Wheezing. Qty: 3 Inhaler, Refills: 3  
  
hydroCHLOROthiazide (HYDRODIURIL) 25 mg tablet Take 0.5 Tabs by mouth daily. Qty: 90 Tab, Refills: 3 Patient Follow Up Instructions:   
Activity: Activity as tolerated Diet: Resume previous diet Wound Care: None needed Follow-up: 
 
Follow-up Information Follow up With Specialties Details Why Contact Joesph Rodrigez DO Internal Medicine  The nurse will contact you with appointment date and time  [facility] [address] [facility] 
[phone] [facility]  Home O2 provider. Contact the office with any questions or concerns [address] [place] [address] [phone]  
  
 
________________________________________________________________ Risk of deterioration: Moderate Condition at Discharge:  Stable 
__________________________________________________________________ Disposition Home with family, no needs 
 
____________________________________________________________________ Code Status: Full Code 
___________________________________________________________________ Total time in minutes spent coordinating this discharge (includes going over instructions, follow-up, prescriptions, and preparing report for sign off to her PCP) :  >30 minutes Signed: 
Erma Rodriguez DO

## 2021-04-19 NOTE — PROGRESS NOTES
ADULT PROTOCOL: JET AEROSOL ASSESSMENT Patient  Orlando Delgado     65 y.o.   male     4/19/2021  11:19 AM 
 
Breath Sounds Pre Procedure: Right Breath Sounds: Diminished Left Breath Sounds: Diminished Breath Sounds Post Procedure: Right Breath Sounds: Diminished Left Breath Sounds: Diminished Breathing pattern: Pre procedure Breathing Pattern: Regular Post procedure Breathing Pattern: Regular Heart Rate: Pre procedure Pulse: 94 
         Post procedure Pulse: 95 Resp Rate: Pre procedure Respirations: 20 
         Post procedure Respirations: 20 
 
Cough: Pre procedure Cough: Non-productive Post procedure Cough: Non-productive Oxygen: O2 Device: None (Room air) SpO2: Pre procedure SpO2: 92 % Post procedure SpO2: 92 % Nebulizer Therapy: Current medications Aerosolized Medications: DuoNeb, Brovana, Pulmicort Problem List:  
Patient Active Problem List  
Diagnosis Code  DJD (degenerative joint disease) of knee M17.10  Primary localized osteoarthrosis, lower leg M17.10  Tobacco abuse disorder Z72.0  Alcohol use Z72.89  Long term (current) use of non-steroidal anti-inflammatories (nsaid) Z79.1  Diverticulosis of colon with hemorrhage K57.31  
 Diverticulosis K57.90  
 SOB (shortness of breath) on exertion R06.02  
 Elevated PSA, less than 10 ng/ml R97.20  Essential hypertension I10  
 Herpes zoster without complication Y24.9  Acute respiratory failure with hypoxia (HCC) J96.01  
 Lung mass R91.8 Respiratory Therapist: Cathy Sotelo, RT

## 2021-04-19 NOTE — CONSULTS
Pulmonary, Critical Care, and Sleep Medicine Initial Patient Consult Name: Leydi Doll MRN: [id number] : 1950 Hospital: [facility] Date: 2021 IMPRESSION:  
· Acute/chronic hypoxic/hypercapnic respiratory failure · Severe COPD - FEV1 0.81 L (25% predicted) 2020, has not seen pulmonary - likely with exacerbation · 3 concerning pulmonary nodules, high risk for malignancy · Wrist pain · Tobacco abuse RECOMMENDATIONS:  
· Please make sure O2 sats documented with activity to make sure he has enough to go home · Bronchodilators · Pt can resume his Trelegy at home · He will need rescue inhaler prn at home and Systemic corticosteroids- taper · Please aski darrell to provide pt with home nebulizer machine and albuterol meds for nebulization Q6 hours prn · Empiric antibiotics · Tobacco cessation counseling · Ortho help appreciated seeing for wrist pain · Pt to call office to make appt with  in one week to arrange for outpt PET CT 
· Will need PET-CT for evaluation of nodules. This can not be done as inpatient. These nodules are not amenable to biopsy, and he is not a surgical candidate. Might be a candidate for SBRT if malignant, but will be challenging with more than one lesion Subjective: This patient has been seen and evaluated at the request of Dr. Edna Miranda for lung masses. Patient is a 65 y.o. male smoker with COPD, presented to the ER today with complaints of wrist pain. However, he was found to be hypoxic into the 70s. A CT was then done which revealed 3 new pulmonary nodules. Patient reports that he has severe dyspnea at baseline having to stop and rest after walking to the mailbox. He is on Trelegy prescribed by his PCP, which helped his dyspnea some though he remains severely symptomatic. No f/c/ns/wt loss. He reports that was recommended to see a pulmonary specialist, but he did not want to do that.  Reports that his SpO2 is typically around 90% on room air. Past Medical History:  
Diagnosis Date  Chronic obstructive pulmonary disease ([facility] [address]  Diverticulitis  Hypertension  Ill-defined condition   
 kidney stones/ stant placed Past Surgical History:  
Procedure Laterality Date  COLONOSCOPY N/A 11/30/2016 COLONOSCOPY performed by Taiwo Ghosh MD at [facility] ENDOSCOPY  COLONOSCOPY,ANSELMO CASTROFORCEP/CAUTERY  11/30/2016  HX BACK SURGERY    
 plate to upper neck  HX CERVICAL FUSION    
 HX ORTHOPAEDIC    
 left ring finger-trauma  HX ORTHOPAEDIC    
 right index finger  HX ORTHOPAEDIC  12/2/14 Bilateral total knee replacements  HX TONSILLECTOMY  HX UROLOGICAL    
 kidney stone extraction/stent Prior to Admission medications Medication Sig Start Date End Date Taking? Authorizing Provider  
nicotine (NICODERM CQ) 14 mg/24 hr patch 1 Patch by TransDERmal route daily for 30 days. 4/20/21 5/20/21 Yes [place], Juliana Driftwood, DO  
fluticasone-umeclidinium-vilanterol (TRELEGY ELLIPTA) 100-62.5-25 mcg inhaler Take 1 Puff by inhalation daily. 1/30/20  Yes Alban Zhu III, DO  
albuterol (PROVENTIL HFA, VENTOLIN HFA, PROAIR HFA) 90 mcg/actuation inhaler Take 2 Puffs by inhalation every six (6) hours as needed for Wheezing. 10/29/19  Yes Alban Zhu III, DO  
hydroCHLOROthiazide (HYDRODIURIL) 25 mg tablet Take 0.5 Tabs by mouth daily. 1/30/20   Donna Hutchinson III, DO No Known Allergies Social History Tobacco Use  Smoking status: Current Every Day Smoker Packs/day: 1.50 Years: 50.00 Pack years: 75.00 Types: Cigarettes  Smokeless tobacco: Never Used  Tobacco comment: electronic cigarettes in the past  
Substance Use Topics  Alcohol use: Yes Alcohol/week: 3.0 standard drinks Types: 3 Cans of beer per week Comment: social -3 drinks weekly--rum Family History Problem Relation Age of Onset  No Known Problems Mother  Emphysema Father Current Facility-Administered Medications Medication Dose Route Frequency  albuterol-ipratropium (DUO-NEB) 2.5 MG-0.5 MG/3 ML  3 mL Nebulization Q6H RT  
 hydroCHLOROthiazide (HYDRODIURIL) tablet 12.5 mg  12.5 mg Oral DAILY  sodium chloride (NS) flush 5-40 mL  5-40 mL IntraVENous Q8H  
 enoxaparin (LOVENOX) injection 40 mg  40 mg SubCUTAneous Q24H  
 nicotine (NICODERM CQ) 14 mg/24 hr patch 1 Patch  1 Patch TransDERmal DAILY  arformoteroL (BROVANA) neb solution 15 mcg  15 mcg Nebulization BID RT And  
 budesonide (PULMICORT) 250 mcg/2ml nebulizer susp  250 mcg Nebulization BID RT Review of Systems: A comprehensive review of systems was negative except for that written in the HPI. Objective:  
Vital Signs:   
Visit Vitals BP (!) 157/82 (BP 1 Location: Right upper arm, BP Patient Position: Sitting) Pulse 88 Temp 98.1 °F (36.7 °C) Resp 18 Ht 5' 10\" (1.778 m) Wt 76.5 kg (168 lb 10.4 oz) SpO2 91% BMI 24.20 kg/m² O2 Device: Nasal cannula O2 Flow Rate (L/min): 3 l/min Temp (24hrs), Av °F (36.7 °C), Min:97.6 °F (36.4 °C), Max:98.3 °F (36.8 °C) Intake/Output:  
Last shift:      No intake/output data recorded. Last 3 shifts: No intake/output data recorded. No intake or output data in the 24 hours ending 21 1252 Physical Exam:  
General:  Alert, cooperative, no distress Head:  Normocephalic, without obvious abnormality, atraumatic. Eyes:  Conjunctivae/corneas clear. .  
Nose: Nares normal. Septum midline. Mucosa normal   
Throat: Lips, mucosa, and tongue normal. Teeth and gums normal.  
Neck: Supple, symmetrical, trachea midline Back:   Symmetric, no curvature. ROM normal.  
Lungs:   Clear to auscultation bilaterally. No wheezing appreciated. Chest wall:  No tenderness or deformity. Heart:  Regular rate and rhythm Abdomen:   Soft, non-tender.  Bowel sounds normal.    
Extremities: Extremities normal, atraumatic, no cyanosis Skin: Skin color, texture, turgor normal. No rashes or lesions Lymph nodes: Cervical, supraclavicular nodes normal.  
Neurologic: Grossly nonfocal  
 
Data review:  
 
Recent Results (from the past 24 hour(s)) CBC W/O DIFF Collection Time: 04/19/21  7:46 AM  
Result Value Ref Range WBC 6.4 4.1 - 11.1 K/uL  
 RBC 4.93 4.10 - 5.70 M/uL  
 HGB 16.2 12.1 - 17.0 g/dL HCT 51.0 (H) 36.6 - 50.3 % .4 (H) 80.0 - 99.0 FL  
 MCH 32.9 26.0 - 34.0 PG  
 MCHC 31.8 30.0 - 36.5 g/dL  
 RDW 14.9 (H) 11.5 - 14.5 % PLATELET 376 (L) 109 - 400 K/uL MPV 10.8 8.9 - 12.9 FL  
 NRBC 0.0 0  WBC ABSOLUTE NRBC 0.00 0.00 - 0.01 K/uL METABOLIC PANEL, BASIC Collection Time: 04/19/21  7:46 AM  
Result Value Ref Range Sodium 139 136 - 145 mmol/L Potassium 4.7 3.5 - 5.1 mmol/L Chloride 97 97 - 108 mmol/L  
 CO2 43 (HH) 21 - 32 mmol/L Anion gap NEG 1 5 - 15 mmol/L Glucose 118 (H) 65 - 100 mg/dL BUN 34 (H) 6 - 20 MG/DL Creatinine 1.03 0.70 - 1.30 MG/DL  
 BUN/Creatinine ratio 33 (H) 12 - 20 GFR est AA >60 >60 ml/min/1.73m2 GFR est non-AA >60 >60 ml/min/1.73m2 Calcium 9.1 8.5 - 10.1 MG/DL Imaging: 
I have personally reviewed the patients radiographs and have reviewed the reports: 
3 new pulmonary nodules (2 in RUL, 1 in RLL); emphysema Harpal Toth MD

## 2021-04-19 NOTE — PROGRESS NOTES
Problem: Chronic Obstructive Pulmonary Disease (COPD) Goal: *Absence of hypoxia Outcome: Progressing Towards Goal 
Goal: *Optimize nutritional status Outcome: Progressing Towards Goal

## 2021-04-19 NOTE — PROGRESS NOTES
Transition of Care Plan: 
  
RUR: 9% - \"low risk\" Disposition: Home with f/u apts & new O2 provided by [employer] Follow up appointments: PCP & speciality apts as indicated DME needed: CM secured new home O2 through [facility]; portable tank/conventrator to be delivered to [facility] prior to d/c Transportation at Discharge: Pt's wife to provide transport at d/c; anticipated arrival projected for this afternoon (2PM-3PM) Keys or means to access home: Pt has access to his home IM Medicare letter: 2nd IM provided 4/19/21 Caregiver Contact: Pt's wife/Deloris Sanchez Alexanders: [phone]) Discharge Caregiver contacted prior to discharge? Pt's wife informed of d/c for today (4/19/21) Update - 1:14 PM: CM acknowledged new consult for \"home nebulizer machine/albuterol meds. \" CM will upload order via [employer] for [facility] to review. CM will request for additional O2 supplies to be delivered to pt's home to avoid delaying d/c. CM met with pt at bedside to deliver O2 tank/concentrator and address any last minute questions or concerns prior to d/c. CM confirmed pt is agreeable to the d/c plan; no additional questions, concerns, or needs identified. Pt reported his wife is in route to provide transport home. Medicare pt has received, reviewed, and provided verbal consent for 2nd IM letter informing them of their right to appeal the discharge. Copy has been placed on pt bedside chart. No further CM needs identified. CM notified pt's nurse of d/c. Update - 12:52 PM: CM received O2 supplies from [employer] representative; CM to deliver to pt at bedside. Update - 12:30 PM: CM received call from [employer] representative (Reuben Benedicto: [phone]) reporting he will deliver O2 supplies to [facility] within the next 30 minutes. CM placed M[facility]'s information in pt's AVS for reference. CM will deliver O2 at bedside once it arrives to [facility]. Update - 10:49 AM: [facility]ane received referral for O2 & is actively reviewing clinical documentation. CM met with pt at bedside to check in and review LOU plan for d/c. CM confirmed pt is agreeable to the d/c plan. Pt informed CM is actively working to secure O2 for home. Pt reported his wife Mo Yoder) will provide transportation at d/c once O2 is delivered; wife to arrive between 2PM-3PM. CM inquired if pt had any immediate questions or concerns related to the d/c plan; pt declined. Update - 10:31 AM: Referral currently pending via [employer] [facility]. Update - 10:17 AM: O2 challenge completed. CM uploaded O2 challenge results & supporting clinical documentation via [employer] for review. CM will report updates on the status of Home O2 once available. Initial note: CM reviewed pt's chart and consulted with attending MD prior to moving forward with d/c planning. MD reported that pt is medically stable for d/c today pending home O2 being secured. CM consulted with RN & requested for O2 challenge to be completed asap; RN to complete. CM to upload O2 challenge & supporting clinical documentation via [employer]. CM specialist attempted to secure PCP f/u apt for d/c; PCP nurse to contact pt directly to schedule f/u apt. CM will meet with pt at bedside to check in and review LOU plan for d/c re: home with f/u apts & new O2. CM will continue to follow & report updates once available. Care Management Interventions PCP Verified by CM: Yes 
Palliative Care Criteria Met (RRAT>21 & CHF Dx)?: No 
Mode of Transport at Discharge: Other (see comment)(Pt's wife to provide transport at d/c) Transition of Care Consult (CM Consult): Discharge Planning Discharge Durable Medical Equipment: Yes(O2 ordered for home. [employer] to provide/deliver O2) Physical Therapy Consult: No 
Occupational Therapy Consult: No 
Speech Therapy Consult: No 
Current Support Network: Own Home, Lives with Spouse Confirm Follow Up Transport: Self [employer] Information Provided?: No 
Discharge Location Discharge Placement: Home(f/u apts & home O2 provided by Rodrig[facility] Julien) Priscilla Wheeler MSW Care Manager, [address] [address] 
[phone]

## 2021-04-19 NOTE — PROGRESS NOTES
Hospitalist Progress Note NAME: Adi Brownlee :  1950 MRN:  [id number] Assessment / Plan: 
Acute respiratory failure with hypoxia most likely secondary to COPD exacerbation versus community-acquired pneumonia New right lung nodules 
-Continue supplemental O2, wean as tolerated to maintain sats > 90 
-still requiring up to 5L NC 
-Appreciate Palm consult 
-Bronchodilators 
-Continue IV steroids 
-Continue empiric IV antibiotics 
-pulm follow-up on DC 
-Wean O2 to maintain sats greater than 90% as able 
-We will need O2 challenge test prior to DC 
-PET/CT of pulmonary nodules which are not amenable to biopsy to be done as outpatient CTA chest  1. No evidence for pulmonary embolism. 2. Emphysema with 3 new right lung nodules, concerning for bronchogenic 
carcinoma. Further evaluation recommended with PET/CT. 2. Prior cervical fusion. Tobacco use 
-S/p cessation counseling 
-cont nicotine replacement Right wrist pain 
-Swelling right wrist noted and this is his main complaint 
-Not particularly painful, possibly related to ongoing DJD 
-X-ray no acute fracture 
-Ultrasound of the upper extremity also negative for DVT 
-Rest, compression, elevation 
-On IV steroids for suspected AE COPD exacerbation and wrist seems to be improving so far 
-Appreciate Ortho consult HTN Resume hydrochlorothiazide, add as needed labetalol Code Status: Full code Surrogate Decision Maker: Wife 
  
DVT Prophylaxis: Lovenox GI Prophylaxis: not indicated 
  
Baseline: Ambulatory Subjective: Chief Complaint / Reason for Physician Visit Anxious to known when he can go home. Denies change in symps, no wheeze, not much cough/sputum. Denies fevers/chills Discussed with RN events overnight. Review of Systems: 
Symptom Y/N Comments  Symptom Y/N Comments Fever/Chills n   Chest Pain n   
Poor Appetite n   Edema Cough y   Abdominal Pain n   
Sputum n   Joint Pain SOB/RSOARIO n Pruritis/Rash Nausea/vomit n   Tolerating PT/OT Diarrhea    Tolerating Diet y Constipation    Other Could NOT obtain due to:   
 
Objective: VITALS:  
Last 24hrs VS reviewed since prior progress note. Most recent are: 
Patient Vitals for the past 24 hrs: 
 Temp Pulse Resp BP SpO2  
04/18/21 1940 97.6 °F (36.4 °C) 89 18 130/77 90 % 04/18/21 1922     91 % 04/18/21 1441     90 % 04/18/21 1421 97.8 °F (36.6 °C) 84 18 133/88 96 % 04/18/21 1036 97.6 °F (36.4 °C) 80 20 138/80 95 % 04/18/21 0730  80 20 135/87 96 % 04/18/21 0717     94 % 04/18/21 0441 98.2 °F (36.8 °C) 81 18 (!) 142/87 98 % 04/17/21 2343 97.6 °F (36.4 °C) 88 18 121/68 91 % No intake or output data in the 24 hours ending 04/18/21 2311 I had a face to face encounter and independently examined this patient on 4/18/2021, as outlined below: PHYSICAL EXAM: 
General: WD, WN. Alert, cooperative, no acute distress EENT:  EOMI. Anicteric sclerae. MMM Resp:  CTA bilaterally, diminished air entry b/l fields. No accessory muscle use CV:  Regular  rhythm,  No edema GI:  Soft, Non distended, Non tender. +Bowel sounds Neurologic:  Alert and oriented X 3, normal speech, Psych:   Good insight. Not anxious nor agitated Skin:  No rashes. No jaundice Reviewed most current lab test results and cultures  YES Reviewed most current radiology test results   YES Review and summation of old records today    NO Reviewed patient's current orders and MAR    YES 
PMH/SH reviewed - no change compared to H&P 
________________________________________________________________________ Care Plan discussed with: 
  Comments Patient x Family  x wife RN x Care Manager Consultant     
                 x Multidiciplinary team rounds were held today with , nursing, pharmacist and clinical coordinator.   Patient's plan of care was discussed; medications were reviewed and discharge planning was addressed. ________________________________________________________________________ Total NON critical care TIME: 35   Minutes Total CRITICAL CARE TIME Spent:   Minutes non procedure based Comments >50% of visit spent in counseling and coordination of care x   
________________________________________________________________________ Yohana Tate DO  
 
Procedures: see electronic medical records for all procedures/Xrays and details which were not copied into this note but were reviewed prior to creation of Plan. LABS: 
I reviewed today's most current labs and imaging studies. Pertinent labs include: 
Recent Labs 04/16/21 
0531 WBC 9.1 HGB 15.0  
HCT 48.2 * Recent Labs 04/16/21 
0531   
K 4.2  CO2 37* * BUN 24* CREA 0.96  
CA 8.7 Signed: Yohana Tate DO

## 2021-04-19 NOTE — PROGRESS NOTES
Pulse ox on RA at rest 87 % Pulse ox on RA during ambulation 82%. Pulse ox on 3L at rest 85% 
puse ox on 3L during ambulation is 87%

## 2021-04-21 NOTE — PROGRESS NOTES
. 
Care Transitions Initial Follow Up Call Call within 2 business days of discharge: Yes Patient: Adi Brownlee Patient : 1950 MRN: [id number] Last Discharge [address] Complaint Diagnosis Description Type Department Provider 21 Wrist Pain Acute respiratory failure with hypoxia ([facility] [address] . .. ED to Hosp-Admission (Discharged) (ADMIT) [id number] Gearline May, DO; [facility]. .. Was this an external facility discharge? No  
 
Challenges to be reviewed by the provider  
- needs outpatient pet scan  
- wearing nicotine patch and currently not smoking  
- now on  2-3LPM 
 
 
    
Method of communication with provider : chart routing Discussed COVID-19 related testing which was available at this time. Test results were not done. Patient informed of results, if available? yes Advance Care Planning:  
Does patient have an Advance Directive: yes, reviewed and current Inpatient Readmission Risk score: Unplanned Readmit Risk Score: 10 Was this a readmission? no  
Patient stated reason for the admission: n/a Patients top risk factors for readmission: lack of knowledge about disease, medical condition-COPD, ARF and multiple health system providers Interventions to address risk factors: Scheduled appointment with PCP-56, Scheduled appointment with Shawn Sahu and reviewed discharge summary and/or continuity of care documents and Education of patient/family/caregiver/guardian to support self-management-see goals Care Transition Nurse (CTN) contacted the patient by telephone to perform post hospital discharge assessment. Verified name and  with patient as identifiers. Provided introduction to self, and explanation of the CTN role. CTN reviewed discharge instructions, medical action plan and red flags with patient who verbalized understanding. Were discharge instructions available to patient? yes.  Reviewed appropriate site of care based on symptoms and resources available to patient including: PCP, Specialist, Chaz Krishna and When to call 911. Patient given an opportunity to ask questions and does not have any further questions or concerns at this time. The patient agrees to contact the PCP office for questions related to their healthcare. Medication reconciliation was performed with patient, who verbalizes understanding of administration of home medications. Advised obtaining a 90-day supply of all daily and as-needed medications. START taking: 
nicotine (Marissa Guardian) Start taking on: April 20, 2021 Referral to Pharm D needed: no  
 
Home Health/Outpatient orders at discharge:none Durable Medical Equipment ordered at discharge: O2 Supplies [address]: [facility] Durable Medical Equipment received: prior to d/c but does need portable tank Covid Risk Education Patient has following risk factors of: COPD and acute respiratory failure. Education provided regarding infection prevention, and signs and symptoms of COVID-19 and when to seek medical attention with patient who verbalized understanding. Discussed exposure protocols and quarantine From CDC: Are you at higher risk for severe illness?  and given an opportunity for questions and concerns. The patient agrees to contact the COVID-19 hotline [phone] or PCP office for questions related to COVID-19. For more information on steps you can take to protect yourself, see CDC's How to Protect Yourself Was patient discharged with a pulse oximeter? no Discussed and confirmed pulse oximeter discharge instructions and when to notify provider or seek emergency care. (patient has own pulse ox) Discussed follow-up appointments. If no appointment was previously scheduled, appointment scheduling offered: yes Is follow up appointment scheduled within 7 days of discharge? yes [facility] follow up appointment(s):  
Future Appointments Date Time Provider Denny Weeks 5/6/2021  2:30 PM MarkoBeatris III, DO [facility] follow up appointment(s): pulmonary 4/26 Plan for follow-up call in 7-10 days based on severity of symptoms and risk factors. Plan for next call: symptom management-assess s/s  and follow up appointment-confirm attendance CTN provided contact information for future needs. Goals Addressed This Visit's Progress  Prevent complications post hospitalization. 4/21/2021 - Spoke to patient today. Patient reports he is feeling well  
- he is now on home 02 at 2-3LPM, he has pulse ox 02 has been 92% or above. He will monitor and contact MD if lower then 90% and does not quickly return. He does need new portable tank, dtr has been in contact with MD for this. - patient is a smoker, however he is wearing his nicotine patch and currently not smoking, advised patient absolutely no smoking around 02.  
- patient waiting for call back from PCP for appt.  
-patient will see pulmonary on 4/26, he needs outpatient pet scan  
- declined need for home health, he will take short walks around the house multiple times daily as long as 02 remains above 90%, reminded patient of fall risk with 02 cord - Reviewed red flag s/s with patient, nausea, vomiting, inability to pass urine/stool, SOB, mental status change, chest pain, fever.   
- patient will deep breath exercise every hour as long as 02 remains above 90% Patient will contact Md/CTN if red flag s/s arise. CTN to f/u ~ 7-10 days. AR    
 
04/23/21 Chart review performed. Patient has PCP appt on 5/6.  AR

## 2021-04-30 NOTE — PROGRESS NOTES
Goals      Prevent complications post hospitalization. 4/21/2021    - Spoke to patient today. Patient reports he is feeling well   - he is now on home 02 at 2-3LPM, he has pulse ox 02 has been 92% or above. He will monitor and contact MD if lower then 90% and does not quickly return. He does need new portable tank, dtr has been in contact with MD for this. - patient is a smoker, however he is wearing his nicotine patch and currently not smoking, advised patient absolutely no smoking around 02.   - patient waiting for call back from PCP for appt.   -patient will see pulmonary on 4/26, he needs outpatient pet scan   - declined need for home health, he will take short walks around the house multiple times daily as long as 02 remains above 90%, reminded patient of fall risk with 02 cord   - Reviewed red flag s/s with patient, nausea, vomiting, inability to pass urine/stool, SOB, mental status change, chest pain, fever.    - patient will deep breath exercise every hour as long as 02 remains above 90%    Patient will contact Md/CTN if red flag s/s arise. CTN to f/u ~ 7-10 days. AR       04/23/21  Chart review performed. Patient has PCP appt on 5/6. AR    04/30/21  Unable to reach patient for follow up call. VM left and my chart message sent.  AR

## 2021-05-06 NOTE — PROGRESS NOTES
Reviewed record in preparation for visit and have obtained necessary documentation. Identified pt with two pt identifiers(name and ). Chief Complaint Patient presents with  
[facility] Follow Up  Concern For COVID-19 (Coronavirus)  
  pt concerned about getting the 2nd shot for his covid vaccine due to him being hospitalized shortly after getting the first shot Health Maintenance Due Topic Date Due  
 COVID-19 Vaccine (1) Never done  Shingles Vaccine (1 of 2) Never done LaceySarah Annual Well Visit  10/29/2020  Pneumococcal Vaccine (2 of 2 - PPSV23) 10/29/2020 Mr. Francesca Freeman has a reminder for a \"due or due soon\" health maintenance. I have asked that he discuss this further with his primary care provider for follow-up on this health maintenance. Coordination of Care Questionnaire: 
:  
 
1) Have you been to an emergency room, urgent care clinic since your last visit? yes Hospitalized since your last visit? yes 2) Have you seen or consulted any other health care providers outside of [address] [place] [address] since your last visit? no  (Include any pap smears or colon screenings in this section.) 3) In the event something were to happen to you and you were unable to speak on your behalf, do you have an Advance Directive/ Living Will in place stating your wishes?  NO

## 2021-05-06 NOTE — PROGRESS NOTES
Layla Hilton is a 65 y.o. male who presents for evaluation of awv, as well as hospital follow up. Last seen by me jan 30, 2020. Was inpt [facility] April 14-19 with acute hypoxic resp failure, copd exac. Ct chest showed some pulm nodules, for which he has PET CT tomorrow morning. He was d/c to home on o2, and is still using it. He has weaned down from 4-5 liters, down to 1-2 liters now. All in all, he feels pretty good. 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 Has not smoked now since mid April. ROS: 
Constitutional: negative for fevers, chills, anorexia and weight loss Eyes:   negative for visual disturbance and irritation ENT:   negative for tinnitus,sore throat,nasal congestion,ear pain,hoarseness Respiratory:  negative for cough, hemoptysis, dyspnea,wheezing CV:   negative for chest pain, palpitations, lower extremity edema GI:   negative for nausea, vomiting, diarrhea, abdominal pain,melena Genitourinary: negative for frequency, dysuria and hematuria Musculoskel: negative for myalgias, arthralgias, back pain, muscle weakness, joint pain Neurological:  negative for headaches, dizziness, focal weakness, numbness Psychiatric:     Negative for depression or anxiety Past Medical History:  
Diagnosis Date  Chronic obstructive pulmonary disease ([address]  Diverticulitis  Hypertension  Ill-defined condition   
 kidney stones/ stant placed Past Surgical History:  
Procedure Laterality Date  COLONOSCOPY N/A 11/30/2016 COLONOSCOPY performed by Pipo Rubi MD at [facility] ENDOSCOPY  COLONOSCOPY,KADI DUARTE/CAUTERGAL  11/30/2016  HX BACK SURGERY    
 plate to upper neck  HX CERVICAL FUSION    
 HX ORTHOPAEDIC    
 left ring finger-trauma  HX ORTHOPAEDIC    
 right index finger  HX ORTHOPAEDIC  12/2/14 Bilateral total knee replacements  HX TONSILLECTOMY  HX UROLOGICAL    
 kidney stone extraction/stent Family History Problem Relation Age of Onset  No Known Problems Mother  Emphysema Father Social History Socioeconomic History  Marital status:  Spouse name: Not on file  Number of children: Not on file  Years of education: Not on file  Highest education level: Not on file Occupational History  Not on file Social Needs  Financial resource strain: Not on file  Food insecurity Worry: Not on file Inability: Not on file  Transportation needs Medical: Not on file Non-medical: Not on file Tobacco Use  Smoking status: Current Every Day Smoker Packs/day: 1.50 Years: 50.00 Pack years: 75.00 Types: Cigarettes  Smokeless tobacco: Never Used  Tobacco comment: electronic cigarettes in the past  
Substance and Sexual Activity  Alcohol use: Yes Alcohol/week: 3.0 standard drinks Types: 3 Cans of beer per week Comment: social -3 drinks weekly--rum  Drug use: No  
 Sexual activity: Yes  
  Partners: Female Lifestyle  Physical activity Days per week: Not on file Minutes per session: Not on file  Stress: Not on file Relationships  Social connections Talks on phone: Not on file Gets together: Not on file Attends [employer] service: Not on file Active member of club or organization: Not on file Attends meetings of clubs or organizations: Not on file Relationship status: Not on file  Intimate partner violence Fear of current or ex partner: Not on file Emotionally abused: Not on file Physically abused: Not on file Forced sexual activity: Not on file Other Topics Concern  Not on file Social History Narrative  Not on file Visit Vitals /77 (BP 1 Location: Left arm, BP Patient Position: Sitting, BP Cuff Size: Adult) Pulse 74 Temp 97.5 °F (36.4 °C) (Temporal) Resp 16 Ht 5' 10\" (1.778 m) Wt 165 lb (74.8 kg) SpO2 97% BMI 23.68 kg/m² Physical Examination:  
General - Well appearing male HEENT - PERRL, TM no erythema/opacification, normal nasal turbinates, no oropharyngeal erythema or exudate, MMM Neck - supple, no bruits, no thyroidomegaly, no lymphadenopathy Pulm - clear to auscultation bilaterally Cardio - RRR, normal S1 S2, no murmur Abd - soft, nontender, no masses, no HSM Extrem - no edema, +2 distal pulses Neuro-  No focal deficits, CN intact Assessment/Plan: 1. Acute hypoxic resp failure--continue to wean o2, suggested to wear it 24/7 for now 2. Copd--continue trelegy 3.  pulm nodules--has PET scan tomorrow morning, then pulmonary follow up next week. 4.  exsmoker--quit last month, April 2021 5. AAA, 3.1 cm--will need ultrasound follow up 6. Elevated psa--check again, last was 4.5 7. Diffuse osteoarthritis--supportive care 8. Gout attack, right wrist--check uric acid 
 
rtc 6 months Cesilia Desouza III, DO This is the Subsequent Medicare Annual Wellness Exam, performed 12 months or more after the Initial AWV or the last Subsequent AWV I have reviewed the patient's medical history in detail and updated the computerized patient record. Assessment/Plan Education and counseling provided: 
Are appropriate based on today's review and evaluation End-of-Life planning (with patient's consent) Pneumococcal Vaccine Influenza Vaccine 1. Medicare annual wellness visit, subsequent Depression Risk Factor Screening 3 most recent PHQ Screens 5/6/2021 Little interest or pleasure in doing things Several days Feeling down, depressed, irritable, or hopeless Several days Total Score PHQ 2 2 Alcohol Risk Screen  Do you average more than 1 drink per night or more than 7 drinks a week: No 
 
In the past three months have you have had more than 4 drinks containing alcohol on one occasion: No 
 
 
 Functional Ability and Level of Safety Hearing: Hearing is good. Activities of Daily Living: The home contains: no safety equipment. Patient does total self care Ambulation: with no difficulty Fall Risk: 
Fall Risk Assessment, last 12 mths 5/6/2021 Able to walk? Yes Fall in past 12 months? 0 Do you feel unsteady? 0 Are you worried about falling 0 Number of falls in past 12 months - Fall with injury? -  
 
 Abuse Screen: 
Patient is not abused. Lives with wife of 48 years in august. 
  
 
Cognitive Screening Has your family/caregiver stated any concerns about your memory: no 
  
Cognitive Screening: Normal - MMSE (Mini Mental Status Exam) Health Maintenance Due Health Maintenance Due Topic Date Due  
 COVID-19 Vaccine (1) Never done  Shingrix Vaccine Age 50> (1 of 2) Never done  Pneumococcal 65+ years (2 of 2 - PPSV23) 10/29/2020 Patient Care Team  
Patient Care Team: 
Michelle Began, DO as PCP - General (Internal Medicine) Michelle Began, DO as PCP - [facility] Empaneled Provider Berny Patrick MD as Physician (Cardiology) Lizeth Andre RN as Care Transitions Nurse History Patient Active Problem List  
Diagnosis Code  DJD (degenerative joint disease) of knee M17.10  Primary localized osteoarthrosis, lower leg M17.10  Tobacco abuse disorder Z72.0  Alcohol use Z72.89  Long term (current) use of non-steroidal anti-inflammatories (nsaid) Z79.1  Diverticulosis of colon with hemorrhage K57.31  
 Diverticulosis K57.90  
 SOB (shortness of breath) on exertion R06.02  
 Elevated PSA, less than 10 ng/ml R97.20  Essential hypertension I10  
 Herpes zoster without complication I26.6  Acute respiratory failure with hypoxia (HCC) J96.01  
 Lung mass R91.8 Past Medical History:  
Diagnosis Date  Chronic obstructive pulmonary disease ([address]  Diverticulitis  Hypertension  Ill-defined condition kidney stones/ stant placed Past Surgical History:  
Procedure Laterality Date  COLONOSCOPY N/A 11/30/2016 COLONOSCOPY performed by Joe Hua MD at [facility] ENDOSCOPY  COLONOSCOPY,KADI DUARTE/CAUTERY  11/30/2016  HX BACK SURGERY    
 plate to upper neck  HX CERVICAL FUSION    
 HX ORTHOPAEDIC    
 left ring finger-trauma  HX ORTHOPAEDIC    
 right index finger  HX ORTHOPAEDIC  12/2/14 Bilateral total knee replacements  HX TONSILLECTOMY  HX UROLOGICAL    
 kidney stone extraction/stent Current Outpatient Medications Medication Sig Dispense Refill  predniSONE (DELTASONE) 20 mg tablet Take 2 tabs by mouth daily with breakfast for 3 days, then take 1 tab by mouth daily for 3 days, then take 1/2 tab daily by mouth for 3 days, then discontinue 11 Tab 0  
 fluticasone-umeclidinium-vilanterol (TRELEGY ELLIPTA) 100-62.5-25 mcg inhaler Take 1 Puff by inhalation daily. 2 Inhaler 0  
 albuterol (PROVENTIL HFA, VENTOLIN HFA, PROAIR HFA) 90 mcg/actuation inhaler Take 2 Puffs by inhalation every six (6) hours as needed for Wheezing. 3 Inhaler 3  
 nicotine (NICODERM CQ) 14 mg/24 hr patch 1 Patch by TransDERmal route daily for 30 days. 30 Patch 0  
 albuterol-ipratropium (DUO-NEB) 2.5 mg-0.5 mg/3 ml nebu 3 mL by Nebulization route every six (6) hours as needed for Wheezing. 30 Nebule 2  
 hydroCHLOROthiazide (HYDRODIURIL) 25 mg tablet Take 0.5 Tabs by mouth daily. 90 Tab 3 No Known Allergies Family History Problem Relation Age of Onset  No Known Problems Mother  Emphysema Father Social History Tobacco Use  Smoking status: Current Every Day Smoker Packs/day: 1.50 Years: 50.00 Pack years: 75.00 Types: Cigarettes  Smokeless tobacco: Never Used  Tobacco comment: electronic cigarettes in the past  
Substance Use Topics  Alcohol use: Yes Alcohol/week: 3.0 standard drinks Types: 3 Cans of beer per week   Comment: social -3 drinks weekly--rum Radha Aguillon III, DO

## 2021-05-06 NOTE — PATIENT INSTRUCTIONS
Medicare Wellness Visit, Male The best way to live healthy is to have a lifestyle where you eat a well-balanced diet, exercise regularly, limit alcohol use, and quit all forms of tobacco/nicotine, if applicable. Regular preventive services are another way to keep healthy. Preventive services (vaccines, screening tests, monitoring & exams) can help personalize your care plan, which helps you manage your own care. Screening tests can find health problems at the earliest stages, when they are easiest to treat. [facility] follows the current, evidence-based guidelines published by the [facility] ([facility]) when recommending preventive services for our patients. Because we follow these guidelines, sometimes recommendations change over time as research supports it. (For example, a prostate screening blood test is no longer routinely recommended for men with no symptoms). Of course, you and your doctor may decide to screen more often for some diseases, based on your risk and co-morbidities (chronic disease you are already diagnosed with). Preventive services for you include: - Medicare offers their members a free annual wellness visit, which is time for you and your primary care provider to discuss and plan for your preventive service needs. Take advantage of this benefit every year! 
-All adults over age 72 should receive the recommended pneumonia vaccines. Current USPSTF guidelines recommend a series of two vaccines for the best pneumonia protection.  
-All adults should have a flu vaccine yearly and tetanus vaccine every 10 years. 
-All adults age 48 and older should receive the shingles vaccines (series of two vaccines).       
-All adults age 38-68 who are overweight should have a diabetes screening test once every three years.  
-Other screening tests & preventive services for persons with diabetes include: an eye exam to screen for diabetic retinopathy, a kidney function test, a foot exam, and stricter control over your cholesterol.  
-Cardiovascular screening for adults with routine risk involves an electrocardiogram (ECG) at intervals determined by the provider.  
-Colorectal cancer screening should be done for adults age 54-65 with no increased risk factors for colorectal cancer. There are a number of acceptable methods of screening for this type of cancer. Each test has its own benefits and drawbacks. Discuss with your provider what is most appropriate for you during your annual wellness visit. The different tests include: colonoscopy (considered the best screening method), a fecal occult blood test, a fecal DNA test, and sigmoidoscopy. 
-All adults born between [facility] should be screened once for Hepatitis C. 
-An Abdominal Aortic Aneurysm (AAA) Screening is recommended for men age 73-68 who has ever smoked in their lifetime. Here is a list of your current Health Maintenance items (your personalized list of preventive services) with a due date: 
Health Maintenance Due Topic Date Due  
 COVID-19 Vaccine (1) Never done  Shingles Vaccine (1 of 2) Never done  Pneumococcal Vaccine (2 of 2 - PPSV23) 10/29/2020 Get fasting labs done. Nothing to eat after MN. I think it is OK for you to get your 2nd covid vaccine.

## 2021-05-14 NOTE — PROGRESS NOTES
Goals  Prevent complications post hospitalization. 4/21/2021 - Spoke to patient today. Patient reports he is feeling well  
- he is now on home 02 at 2-3LPM, he has pulse ox 02 has been 92% or above. He will monitor and contact MD if lower then 90% and does not quickly return. He does need new portable tank, dtr has been in contact with MD for this. - patient is a smoker, however he is wearing his nicotine patch and currently not smoking, advised patient absolutely no smoking around 02.  
- patient waiting for call back from PCP for appt.  
-patient will see pulmonary on 4/26, he needs outpatient pet scan  
- declined need for home health, he will take short walks around the house multiple times daily as long as 02 remains above 90%, reminded patient of fall risk with 02 cord - Reviewed red flag s/s with patient, nausea, vomiting, inability to pass urine/stool, SOB, mental status change, chest pain, fever.   
- patient will deep breath exercise every hour as long as 02 remains above 90% Patient will contact Md/CTN if red flag s/s arise. CTN to f/u ~ 7-10 days. AR    
 
04/23/21 Chart review performed. Patient has PCP appt on 5/6. AR 
 
04/30/21 Unable to reach patient for follow up call. VM left and my chart message sent. AR 
 
05/14/21 Unable to reach patient for follow up LOU call. VM left. Patient did see PCP on 5/6. CTN to f/u ~ 7-10 days.  AR

## 2021-05-26 NOTE — PROGRESS NOTES
Patient has graduated from the Transitions of Care Coordination  program on 05/26/21 Patient was not referred to the [facility] team for further management. Goals Addressed This Visit's Progress  COMPLETED: Prevent complications post hospitalization. 4/21/2021 - Spoke to patient today. Patient reports he is feeling well  
- he is now on home 02 at 2-3LPM, he has pulse ox 02 has been 92% or above. He will monitor and contact MD if lower then 90% and does not quickly return. He does need new portable tank, dtr has been in contact with MD for this. - patient is a smoker, however he is wearing his nicotine patch and currently not smoking, advised patient absolutely no smoking around 02.  
- patient waiting for call back from PCP for appt.  
-patient will see pulmonary on 4/26, he needs outpatient pet scan  
- declined need for home health, he will take short walks around the house multiple times daily as long as 02 remains above 90%, reminded patient of fall risk with 02 cord - Reviewed red flag s/s with patient, nausea, vomiting, inability to pass urine/stool, SOB, mental status change, chest pain, fever.   
- patient will deep breath exercise every hour as long as 02 remains above 90% Patient will contact Md/CTN if red flag s/s arise. CTN to f/u ~ 7-10 days. AR    
 
04/23/21 Chart review performed. Patient has PCP appt on 5/6. AR 
 
04/30/21 Unable to reach patient for follow up call. VM left and my chart message sent. AR 
 
05/14/21 Unable to reach patient for follow up LOU call. VM left. Patient did see PCP on 5/6. CTN to f/u ~ 7-10 days. AR Patients upcoming visits:  No future appointments.

## 2021-11-01 PROBLEM — I31.4 CARDIAC TAMPONADE: Status: ACTIVE | Noted: 2021-01-01

## 2021-11-01 NOTE — CONSULTS
IP Cardiology Consult       Date of consult:  11/01/21  Date of admission: 11/1/2021  Primary Cardiologist: Dr Alvarez Call   Physician Requesting consult: Dr Adela Root       Assessment:    Problem list:   Pulmonary embolism   Pericardial effusion - moderate effusion without hemodynamic compromise   Pulmonary nodule - suspected malignant as they were not present on scan in 2019 (seen by Dr Denis Temple at [facility] and discussed in tumor board- empiric treatment, biopsy and observation was discussed)   COPD/ Emphysema - Follows with Dr Micheal Tamez   Diastolic heart failure   HTN  Pulmonary HTN, PA pressure 56 on previous echo   Dilated ascending aorta 4.5 cm   Smoking            Recommendations:    Patient presented with pulmonary embolism and also has moderate pericardial effusion without RV collapse on echo. His BP and HR is in normal range at present. Discussed with patient about indications for pericardiocentesis - diagnostic and therapeutic  He does not need therapeutic pericardiocentesis at present as his BP is in good range and no collapse on echo. However, he has lung nodules and suspected lung malignancy. With PE and pericardial effusion there would be concern for malignancy. Pericardiocentesis may be consider for diagnostic purpose. Will start patient on heparin gtt for [facility] for PE   Cont to monitor hemodynamics   Will cont to follow     Thank you for this consult and allowing me to take part in this patients care. Please call with questions. [x]        High complexity decision making was performed       CC / Reason for consult: Pericardial effusion     History of the presenting illness:  Mariano Goodman is a 66 y.o. Male presented with near syncope, diaphoresis and urinary incontinence this am then also noted to have CP, diaphoresis and worsening of SOB. Since arrival felt okay. Has low BP initially but then it improved with IV fluid. CT showed PE and pl.  Effusion with possible hemodynamic compromise. Echo reviewed showed moderate effusion and no RV collapse. Patient's BP 110s and HR 70s. Past Medical History:   Diagnosis Date    Chronic obstructive pulmonary disease ([address]     Diverticulitis     Hypertension     Ill-defined condition     kidney stones/ stant placed       Prior to Admission medications    Medication Sig Start Date End Date Taking? Authorizing Provider   Gerard Almazan 100-62.5-25 mcg inhaler INHALE 1 PUFF BY MOUTH DAILY 7/25/21  Yes Alban Zhu III, DO   albuterol (PROVENTIL HFA, VENTOLIN HFA, PROAIR HFA) 90 mcg/actuation inhaler Take 2 Puffs by inhalation every six (6) hours as needed for Wheezing. Patient not taking: Reported on 11/1/2021 10/29/19   Emmanuel Mohan DO       Family History   Problem Relation Age of Onset    No Known Problems Mother     Emphysema Father        Social History     Socioeconomic History    Marital status:      Spouse name: Not on file    Number of children: Not on file    Years of education: Not on file    Highest education level: Not on file   Occupational History    Not on file   Tobacco Use    Smoking status: Current Every Day Smoker     Packs/day: 1.50     Years: 50.00     Pack years: 75.00     Types: Cigarettes    Smokeless tobacco: Never Used    Tobacco comment: electronic cigarettes in the past   Substance and Sexual Activity    Alcohol use: Yes     Alcohol/week: 3.0 standard drinks     Types: 3 Cans of beer per week     Comment: social -3 drinks weekly--rum    Drug use: No    Sexual activity: Yes     Partners: Female   Other Topics Concern    Not on file   Social History Narrative    Not on file     Social Determinants of Health     Financial Resource Strain:     Difficulty of Paying Living Expenses:    Food Insecurity:     Worried About Running Out of Food in the Last Year:     [address] in the Last Year:    Transportation Needs:     Lack of Transportation (Medical):      Lack of Transportation (Non-Medical):    Physical Activity:     Days of Exercise per Week:     Minutes of Exercise per Session:    Stress:     Feeling of Stress :    Social Connections:     Frequency of Communication with Friends and Family:     Frequency of Social Gatherings with Friends and Family:     Attends [employer] Services:     Active Member of Clubs or Organizations:     Attends Club or Organization Meetings:     Marital Status:    Intimate Partner Violence:     Fear of Current or Ex-Partner:     Emotionally Abused:     Physically Abused:     Sexually Abused:          ROS      Total of 12 systems reviewed, all systems review was negative except Pertinent Positives included in HPI     Visit Vitals  BP (!) 115/91   Pulse 89   Temp 97 °F (36.1 °C)   Resp 21   Ht 5' 9\" (1.753 m)   Wt 75.4 kg (166 lb 3.6 oz)   SpO2 91%   BMI 24.55 kg/m²       Physical Exam  Examination:     General: Alert + Oriented x3, no acute distress   HEENT: Normocephalic aromatic, MMM   Neck: Supple, JVP- not well appreciated   RS: Non labored, clear   CVS: Regular rate and rhythm, S1S2, no murmur   Abd: Soft, non tender, non distended   Lower extremity: Warm to touch, Edema- None   Skin: Warm and dry, No significant bruises or rash   CNS: Oriented x3, no focal neuro deficit     Lab review:  BMP:   Lab Results   Component Value Date/Time     11/01/2021 08:36 AM    K 3.6 11/01/2021 08:36 AM     11/01/2021 08:36 AM    CO2 33 (H) 11/01/2021 08:36 AM    AGAP 3 (L) 11/01/2021 08:36 AM     (H) 11/01/2021 08:36 AM    BUN 17 11/01/2021 08:36 AM    CREA 1.16 11/01/2021 08:36 AM    GFRAA >60 11/01/2021 08:36 AM    GFRNA >60 11/01/2021 08:36 AM        CBC:  Lab Results   Component Value Date/Time    WBC 8.2 11/01/2021 08:36 AM    HGB 15.3 11/01/2021 08:36 AM    HCT 48.3 11/01/2021 08:36 AM    PLATELET 885 28/53/6526 08:36 AM    .2 (H) 11/01/2021 08:36 AM       All Cardiac Markers in the last 24 hours:  No results found for: CPK, CK, CKMMB, CKMB, RCK3, CKMBT, CKMBPOC, CKNDX, CKND1, NADEEM, TROPT, TROIQ, JOSSE, TROPT, TNIPOC, BNP, BNPP, BNPNT    Data review:    Tele: NSR    EKG tracing personally reviewed:   ALDA PICKETT     Echocardiogram:  Pending   Previous echo reviewed as belove in clinic note   EF range 55%-60%, Mild LVH with normal LV function. EF 55-60%. Grade 1 DD. 2. Normal RV function and size. 3. Mild AI. 4. Elevated RVSP at 56 mm Hg. 5. Dilated ascending aorta at 4.5 cm. Other cardiac testing:    Other imaging:    CTA  IMPRESSION  1. Positive for pulmonary embolism of left lower lobe pulmonary arteries. 2. Moderate-sized pericardial effusion which appears hemodynamically  significant. 3. 3 right lung nodules appearing unchanged in the interval.  4. Moderate to severe emphysema.     The findings were called to Dr. Rama Aguirre on 11/1/2021 at 1:27 PM by myself. [phone]     PETCT  Mildly hypermetabolic nodule right upper lobe and minimally  hypermetabolic nodule perihilar right upper lobe. These findings are nonspecific  and could represent any number of inflammatory or neoplastic etiologies. Small  nodule right lower lobe does not demonstrate increased tracer activity. Continuing follow-up by CT is recommended to ensure stability. Otherwise normal  tracer distribution. Signed:  Noreen Sellers MD  Interventional Cardiology  11/1/2021           __________________________________________________________________________              March 1, 2021      Mercedes Ordonez  65year old M (1950)  Account #: [de-identified]  PRIMARY CARE PHYSICIAN:  Robert Arevalo III, MD  CARDIOLOGIST:  Taiwo Aponte MD    REASON FOR VISIT:  This 65year old male presents for CHF. HISTORY OF PRESENT ILLNESS:   Mr Crane with pmh of     1. CHF with preserved EF   2. Pulmonary hypertension (Elevated RVSP on echo at 56)   3. Dilated ascending aorta (4.5 cm on echo) and 4.5 mm on CTA in 2/2021   4. Hypertension   5. Possible COPD   6. Smoking   7. Lives with wife, works at [employer]    He is here for follow up. Cont to have ROSARIO which is stable. he is trying to quit. has not had PFT yet. He had episode of mechanical fall. he tripped and fell over loan mower. No chest pain or syncope. CARDIAC HISTORY  CHF/CM:  1 CHF     RISK FACTORS:  1 Hypertension       CARDIOVASCULAR PROCEDURES  Procedure Date Results   Echo 2020 EF range 55%-60%, Mild LVH with normal LV function. EF 55-60%. Grade 1 DD. 2. Normal RV function and size. 3. Mild AI. 4. Elevated RVSP at 56 mm Hg. 5. Dilated ascending aorta at 4.5 cm. EKG 2020 Sinus Rhythm   MPI 2020 EF 0.71 (71%), No ischemia or infarction on perfusion imaging using pharmacological stress. Thoracic Ao CTA 2021 Mild-to-moderate dilatation of the ascending thoracic aorta without evidence of dissection. Ascending aortic aneurysm 44x45 mm. ACTIVE ALLERGIES:  Ingredient Reaction Comment   NO KNOWN ALLERGIES       None    PROBLEM LIST:  Problem Description Chronic   Congestive heart failure Y   Hypertension Y       PAST MEDICAL/SURGICAL HISTORY  (Detailed)    Disease/disorder Onset Date Surgical History Date Comments     Tonsillectomy       kidney stone extraction/stent       bilateral knee replacements     Arthritis       chf       COPD       diverticulitis       Hypertension       kidney stones         OBSTETRIC HISTORY:  Pregnancy not pertinent. Family History  (Detailed)  Relationship Family Member Name  Age at Death Condition Onset Age Cause of Death       No family history of Coronary artery disease  N       No family history of Coronary artery disease, premature  N   Father    emphysema  N     SOCIAL HISTORY  (Detailed)  Tobacco use reviewed. Preferred language is [place]. Smoking status: Light tobacco smoker.       SMOKING STATUS  Use Status Type Smoking Status Usage Per Day Years Used Total Pack Years   yes Cigarette Light tobacco smoker 4 Cigarettes TOBACCO CESSATION INFORMATION  Date Counseled By Order Status Description Code Tobacco Cessation Information   11/12/2020 Anna Haro. Maty Tobacco cessation counseling completed   Smoking cessation education   12/03/2020 Anna Rutledge Tobacco cessation counseling completed   Smoking cessation education   03/01/2021 Alfred Fernando Tobacco cessation counseling completed   Smoking cessation education         ALCOHOL  There is a history of alcohol use. consumed occasionally. CAFFEINE  The patient uses caffeine. LIFESTYLE  Never exercises. REVIEW OF SYMPTOMS:    CONST - Negative for weight gain, weight loss, fever. EYES - Negative for visual changes. ENT - Negative for hearing loss. RESP - Negative for snoring, hemoptysis, dyspnea. CARD - Negative for chest pain, diaphoresis, orthopnea, palpitation, syncope, PND.  VASC - Negative for claudication, edema. GI - Negative for nausea, reflux, bleeding.  - Negative for hematuria, nocturia. REPROD - Negative for erectile dysfunction. ENDO - Negative for goiter, tremors. NEURO - Negative for dizziness, memory loss, seizures. PSYCH - Negative for depression, hallucinations. DERM - Negative for rash, skin sores. M/S - Negative for joint pain, myalgia. HEMAT - Negative for acute anemia, thrombocytopenia. VITAL SIGNS  Time BP mm/Hg Pulse /min Resp /min Temp F Ht ft Ht in Ht cm Wt lb Wt kg BMI kg/m2 BSA m2 O2 Sat%   9:16 /70 88   5.0 10.00 177.80 171.80 77.927 24.65         PHYSICAL EXAM:  Exam Findings Details   Const Neg Level of Distress - Awake / Alert, No Acute Distress. Nourishment - Well Nourished. Eyes Neg Lids/External - Bilateral Normal.  Conjunctiva - Bilateral Normal.   NMT Neg Oral Mucosa - Moist, No Cyanosis, No Pallor. Neck Neg JVP - Less Than 8. Resp Neg Respirations - Nonlabored. Breath Sounds - Clear Throughout. Rales - Absent. Wheezes - Absent. Rhonchi - Absent. Cardiac Neg Rhythm - Regular.   Palpation - PMI Normal.  Heart Sounds - S1 Normal, S2 Normal, No S3, No S4. Extra Sounds - None. Murmurs - None. Vasc Neg Carotid - Bilateral Normal Pulse. Abd Neg Tenderness - None. Hepatomegaly - Absent. Splenomegaly - Absent. Skin Neg Skin - Warm. Venous Stasis Ulcers - Absent. M/S Neg Gait - Normal.  Able to Exercise - Yes. EXT Neg Clubbing - Absent. Ischemic Ulcers - Absent. Lower Extremity Edema - Absent. Psych Neg Orientation - Oriented to Time, Person, Place. IMPRESSION AND PLAN  01. Chronic heart failure with preserved ejection fraction (I50.32):  He had elevated BNP, pedal edema and dyspnea, pedal edema resolved with lasix   Preserved EF   No significant overload   Recommended to cont lasix   02. Shortness of breath (R06.02):  Elevated BNP,   CHF +/- COPD +/- Pulm HTN   Cont lasix   Pulmonary function test pending   Smoking cessation  he wants to wait for PFT and wants to stop smoking before gets PFT   03. Pulmonary hypertension (I27.20):  RVSP 56 on Echo   Likely  related to CHF +/- COPD   Cont lasix   Refer to pulmonary   04. Ascending aorta dilatation (I77.810):  4.5 cm by CTA in 2/2021   repeat imaging in 1 year   05. Essential (primary) hypertension (I10):  stable   cont to monitor   06. Cigarette nicotine dependence without complication (R63.933):  Encourage to quit   discussed about nicotine patch or chantix if he would like to try  The patient was instructed on smoking cessation. 07. Body mass index [BMI] 24.0-24.9, adult (F39.82)     ORDERS:  1 Tobacco cessation counseling    2 Dietary management education, guidance, and counseling    3 PFT at the first available time. 4 Return office visit with Erica Mackenzie MD in 6 Months.         FINAL MEDICATION LIST  Medication Sig Desc Non-VCS   albuterol sulfate HFA 90 mcg/actuation aerosol inhaler inhale 2 puff by inhalation route  every 4 - 6 hours as needed as needed Y   Lasix 20 mg tablet take 1 tablet by oral route  every day N   Lipitor 10 mg tablet take 1 tablet by oral route  every day N   nicotine 14 mg/24 hr daily transdermal patch apply 1 patch by transdermal route  every day N   nitroglycerin 0.4 mg sublingual tablet place 1 tablet by sublingual route  every 5 minutes as needed for chest pain. Do not exceed 3 doses in 15 minutes.  N   Trelegy Ellipta 100 mcg-62.5 mcg-25 mcg powder for inhalation inhale 1 puff by inhalation route  every day at the same time each day Y   Tylenol Extra Strength 500 mg tablet take 2 tablet by oral route  every 6 hours as needed as needed Y       Electronically signed by:       Eliu Chase MD 03/01/2021  @ 11:00 AM    cc:    Richardson Sykes MD     Document generated by: Eliu Chase 03/01/2021      ----------------------------------------------------------------------------------------------------------------------------------------------------------------------

## 2021-11-01 NOTE — PROGRESS NOTES
TRANSFER - IN REPORT: 
 
Verbal report received from Abdullahi Riggins RN(name) on Miri Diaz  being received from ED(unit) for routine progression of care Report consisted of patients Situation, Background, Assessment and  
Recommendations(SBAR). Information from the following report(s) SBAR, Kardex, Intake/Output, MAR and Cardiac Rhythm NSR was reviewed with the receiving nurse. Opportunity for questions and clarification was provided. Assessment completed upon patients arrival to unit and care assumed. Called cardiac surgery consult, spoke with Dr. Asher Zhang- states he was already consulted and cardiology was to see patient. DC'd order for cardiac surgery consult. Primary Nurse Bunny Mccann RN and Kerline Orlando RN performed a dual skin assessment on this patient No impairment noted Ender score is 18 End of Shift Note Bedside shift change report given to  (oncoming nurse) by Bunny Mccann RN (offgoing nurse). Report included the following information SBAR, Kardex, Intake/Output, MAR and Cardiac Rhythm NSR Shift worked:  7a-7p Shift summary and any significant changes:  
  received patient from ED, started on heparin dtt. Admission database completed. Concerns for physician to address:  plan? Does patient need to remain NPO? Zone phone for oncoming shift:    
  
 
Activity: 
Activity Level: Bed Rest 
Number times ambulated in hallways past shift: 0 Number of times OOB to chair past shift: 0 Cardiac:  
Cardiac Monitoring: Yes     
Cardiac Rhythm: Sinus Rhythm Access:  
Current line(s): PIV Genitourinary:  
Urinary status: voiding Respiratory:  
O2 Device: None (Room air) Chronic home O2 use?: YES Incentive spirometer at bedside: NO 
  
GI: 
Last Bowel Movement Date: 11/01/21 Current diet:  DIET NPO Passing flatus: YES Tolerating current diet: NO 
  
 
Pain Management:  
Patient states pain is manageable on current regimen: YES Skin: 
Ender Score: 18 
Interventions: limit briefs Patient Safety: 
Fall Score: Total Score: 2 Interventions: bed/chair alarm Length of Stay: 
Expected LOS: - - - Actual LOS: 0 Bg ESTEFANIA Cortez

## 2021-11-01 NOTE — ED PROVIDER NOTES
EMERGENCY DEPARTMENT HISTORY AND PHYSICAL EXAM      Date: 11/1/2021  Patient Name: Nick Gowers  Patient Age and Sex: 66 y.o. male  MRN:  [id number]  [facility]:  [id number]    History of Presenting Illness     Chief Complaint   Patient presents with    Chest Pain     Patient ambulatory to triage w c/o chest pain. History Provided By: Patient and Patient's Wife    Ability to gather history was limited by:     HPI: Nick Gowers, 66 y.o. male with history of COPD, possible/early lung cancer (although patient seems unaware of this), complains of shortness of breath at work today accompanied by central chest pain, moderate severity. He became very lightheaded at work, almost passed out, urinated on himself. No seizure-like activity. Location:    Quality:      Severity:    Duration:   Timing:      Context:    Modifying factors:   Associated symptoms:     Past History      The patient's medical, surgical, and social history on file were reviewed by me today.      The family history was reviewed by me today and was non-contributory, unless otherwise specified below:    Past Medical History:  Past Medical History:   Diagnosis Date    Chronic obstructive pulmonary disease ([address]     Diverticulitis     Hypertension     Ill-defined condition     kidney stones/ stant placed       Past Surgical History:  Past Surgical History:   Procedure Laterality Date    COLONOSCOPY N/A 11/30/2016    COLONOSCOPY performed by Melonie Michelle MD at . [facility] [address] [facility]  11/30/2016         HX BACK SURGERY      plate to upper neck    HX CERVICAL FUSION      HX ORTHOPAEDIC      left ring finger-trauma    HX ORTHOPAEDIC      right index finger    HX ORTHOPAEDIC  12/2/14    Bilateral total knee replacements    HX TONSILLECTOMY      HX UROLOGICAL      kidney stone extraction/stent       Family History:  Family History   Problem Relation Age of Onset    No Known Problems Mother     Emphysema Father        Social History:  Social History     Tobacco Use    Smoking status: Current Every Day Smoker     Packs/day: 1.50     Years: 50.00     Pack years: 75.00     Types: Cigarettes    Smokeless tobacco: Never Used    Tobacco comment: electronic cigarettes in the past   Substance Use Topics    Alcohol use: Yes     Alcohol/week: 3.0 standard drinks     Types: 3 Cans of beer per week     Comment: social -3 drinks weekly--rum    Drug use: No       Current Medications:  No current facility-administered medications on file prior to encounter. Current Outpatient Medications on File Prior to Encounter   Medication Sig Dispense Refill    Trelegy Ellipta 100-62.5-25 mcg inhaler INHALE 1 PUFF BY MOUTH DAILY 60 Blister 11    [DISCONTINUED] furosemide (LASIX) 20 mg tablet Take 1 Tab by mouth daily. 90 Tab 3    [DISCONTINUED] albuterol-ipratropium (DUO-NEB) 2.5 mg-0.5 mg/3 ml nebu 3 mL by Nebulization route every six (6) hours as needed for Wheezing. 30 Nebule 2    albuterol (PROVENTIL HFA, VENTOLIN HFA, PROAIR HFA) 90 mcg/actuation inhaler Take 2 Puffs by inhalation every six (6) hours as needed for Wheezing. (Patient not taking: Reported on 11/1/2021) 3 Inhaler 3       Allergies:  No Known Allergies  Review of Systems    A complete ROS was reviewed by me today and was negative, unless otherwise specified below:    Review of Systems   Constitutional: Positive for fatigue. Negative for fever. Respiratory: Positive for shortness of breath. Cardiovascular: Positive for chest pain. Negative for palpitations and leg swelling. Gastrointestinal: Negative for abdominal pain. Neurological: Positive for light-headedness. Negative for syncope and headaches. All other systems reviewed and are negative.       Physical Exam   Vital Signs  Patient Vitals for the past 8 hrs:   Temp Pulse Resp BP SpO2   11/01/21 1500  89 21 (!) 115/91 91 %   11/01/21 1445    115/66    11/01/21 1322  87 21 110/74 96 % 11/01/21 1230  86 21 108/76 90 %   11/01/21 1229  86 22 115/75 95 %   11/01/21 1147     95 %   11/01/21 1130    96/75    11/01/21 1115  79 17 90/69 94 %   11/01/21 1045  84 18 (!) 79/62 95 %   11/01/21 0945  80 16 (!) 84/65 94 %   11/01/21 0931  81 21 (!) 81/60 96 %   11/01/21 0929    (!) 81/60    11/01/21 0918  82 16 (!) 78/55 98 %   11/01/21 0823 97 °F (36.1 °C) 87 18 (!) 87/59 100 %          Physical Exam  Vitals and nursing note reviewed. Constitutional:       General: He is not in acute distress. Appearance: Normal appearance. He is well-developed. He is ill-appearing and toxic-appearing. HENT:      Head: Normocephalic and atraumatic. Eyes:      General:         Right eye: No discharge. Left eye: No discharge. Conjunctiva/sclera: Conjunctivae normal.   Cardiovascular:      Rate and Rhythm: Normal rate and regular rhythm. Heart sounds: Normal heart sounds. No murmur heard. Pulmonary:      Effort: Pulmonary effort is normal. No respiratory distress. Breath sounds: Normal breath sounds. No wheezing. Abdominal:      General: There is no distension. Palpations: Abdomen is soft. Tenderness: There is no abdominal tenderness. Musculoskeletal:         General: No deformity. Normal range of motion. Cervical back: Normal range of motion and neck supple. Skin:     General: Skin is warm and dry. Coloration: Skin is pale. Findings: No rash. Neurological:      General: No focal deficit present. Mental Status: He is alert and oriented to person, place, and time. Psychiatric:         Mood and Affect: Mood normal.         Behavior: Behavior normal.         Thought Content:  Thought content normal.         Diagnostic Study Results   Labs  Recent Results (from the past 24 hour(s))   EKG, 12 LEAD, INITIAL    Collection Time: 11/01/21  8:29 AM   Result Value Ref Range    Ventricular Rate 89 BPM    Atrial Rate 89 BPM    P-R Interval 138 ms    QRS Duration 86 ms    Q-T Interval 360 ms    QTC Calculation (Bezet) 438 ms    Calculated P Axis 74 degrees    Calculated R Axis -13 degrees    Calculated T Axis 71 degrees    Diagnosis       Normal sinus rhythm  Possible Left atrial enlargement    Confirmed by Micah Chahal MD, Christiano Mccracken ([id number]) on 11/1/2021 10:24:20 AM     CBC WITH AUTOMATED DIFF    Collection Time: 11/01/21  8:36 AM   Result Value Ref Range    WBC 8.2 4.1 - 11.1 K/uL    RBC 4.68 4.10 - 5.70 M/uL    HGB 15.3 12.1 - 17.0 g/dL    HCT 48.3 36.6 - 50.3 %    .2 (H) 80.0 - 99.0 FL    MCH 32.7 26.0 - 34.0 PG    MCHC 31.7 30.0 - 36.5 g/dL    RDW 15.6 (H) 11.5 - 14.5 %    PLATELET 184 894 - 781 K/uL    MPV 10.7 8.9 - 12.9 FL    NRBC 0.0 0  WBC    ABSOLUTE NRBC 0.00 0.00 - 0.01 K/uL    NEUTROPHILS 80 (H) 32 - 75 %    LYMPHOCYTES 11 (L) 12 - 49 %    MONOCYTES 6 5 - 13 %    EOSINOPHILS 3 0 - 7 %    BASOPHILS 0 0 - 1 %    IMMATURE GRANULOCYTES 0 0.0 - 0.5 %    ABS. NEUTROPHILS 6.6 1.8 - 8.0 K/UL    ABS. LYMPHOCYTES 0.9 0.8 - 3.5 K/UL    ABS. MONOCYTES 0.5 0.0 - 1.0 K/UL    ABS. EOSINOPHILS 0.2 0.0 - 0.4 K/UL    ABS. BASOPHILS 0.0 0.0 - 0.1 K/UL    ABS. IMM. GRANS. 0.0 0.00 - 0.04 K/UL    DF AUTOMATED     METABOLIC PANEL, COMPREHENSIVE    Collection Time: 11/01/21  8:36 AM   Result Value Ref Range    Sodium 142 136 - 145 mmol/L    Potassium 3.6 3.5 - 5.1 mmol/L    Chloride 106 97 - 108 mmol/L    CO2 33 (H) 21 - 32 mmol/L    Anion gap 3 (L) 5 - 15 mmol/L    Glucose 174 (H) 65 - 100 mg/dL    BUN 17 6 - 20 MG/DL    Creatinine 1.16 0.70 - 1.30 MG/DL    BUN/Creatinine ratio 15 12 - 20      GFR est AA >60 >60 ml/min/1.73m2    GFR est non-AA >60 >60 ml/min/1.73m2    Calcium 9.0 8.5 - 10.1 MG/DL    Bilirubin, total 0.8 0.2 - 1.0 MG/DL    ALT (SGPT) 36 12 - 78 U/L    AST (SGOT) 43 (H) 15 - 37 U/L    Alk.  phosphatase 104 45 - 117 U/L    Protein, total 6.4 6.4 - 8.2 g/dL    Albumin 3.4 (L) 3.5 - 5.0 g/dL    Globulin 3.0 2.0 - 4.0 g/dL    A-G Ratio 1.1 1.1 - 2.2 TROPONIN-HIGH SENSITIVITY    Collection Time: 11/01/21  8:36 AM   Result Value Ref Range    Troponin-High Sensitivity 27 0 - 76 ng/L   POC LACTIC ACID    Collection Time: 11/01/21 12:07 PM   Result Value Ref Range    Lactic Acid (POC) 2.59 (HH) 0.40 - 2.00 mmol/L   POC LACTIC ACID    Collection Time: 11/01/21  2:55 PM   Result Value Ref Range    Lactic Acid (POC) 2.14 (HH) 0.40 - 2.00 mmol/L       Radiologic Studies  CTA CHEST W OR W WO CONT   Final Result   1. Positive for pulmonary embolism of left lower lobe pulmonary arteries. 2. Moderate-sized pericardial effusion which appears hemodynamically   significant. 3. 3 right lung nodules appearing unchanged in the interval.   4. Moderate to severe emphysema. The findings were called to Dr. Anju Sanchez on 11/1/2021 at 1:27 PM by myself. [phone]         XR CHEST PA LAT   Final Result   No acute abnormality. CT Results  (Last 48 hours)               11/01/21 1217  CTA CHEST W OR W WO CONT Final result    Impression:  1. Positive for pulmonary embolism of left lower lobe pulmonary arteries. 2. Moderate-sized pericardial effusion which appears hemodynamically   significant. 3. 3 right lung nodules appearing unchanged in the interval.   4. Moderate to severe emphysema. The findings were called to Dr. Anju Sanchez on 11/1/2021 at 1:27 PM by myself. [phone]           Narrative:  INDICATION: severe COPD, hypoxia, hypotension       COMPARISON: Earlier radiographs, CT 4/14/2021. EXAM: Precontrast localizer was first performed to verify the levels of the   pulmonary arteries. Subsequently, contiguous axial images were obtained after   the rapid IV bolus administration of 80 cc Isovue-370, followed by thin section   axial reconstructions with multiplanar reformations. Three-dimensional post -   processing was performed.   CT dose reduction was achieved through use of a   standardized protocol tailored for this examination and automatic exposure control for dose modulation. FINDINGS: Pulmonary embolism is demonstrated in the segmental and more distal   branches of the left lower lobe. Moderate to severe emphysema is again demonstrated. A right upper lobe pulmonary   nodule is again shown measuring 9 mm, appearing unchanged. The perihilar right   upper lobe nodule is again demonstrated, measuring 9 mm in size, also appearing   unchanged. A right lower lobe pulmonary nodule in the anterior basilar segment   is again demonstrated measuring 10 mm in size, unchanged. There is no pneumothorax or pleural effusion. There is interval demonstration of   a moderate pericardial effusion with diminished cardiac chamber size and reflux   of contrast material into the IVC and hepatic veins. No mediastinal or hilar   mass is shown. Portable artery diameter measures 3.3 cm, raising the possibility of pulmonary   arterial hypertension. A mild lower thoracic spine superior endplate vertebral   compression fracture is unchanged. No interval vertebral compression fracture is   shown. Bones are moderately osteopenic. CXR Results  (Last 48 hours)               11/01/21 0858  XR CHEST PA LAT Final result    Impression:  No acute abnormality. Narrative:  Exam:  2 view chest       Indication: Chest pain       Comparison to 4/14/2021. PA and lateral views demonstrate normal heart size. There is no acute process in   the lung fields. The osseous structures are unremarkable. Elevation of the left   hemidiaphragm is unchanged.                  Billable Procedures   EKG    Date/Time: 11/1/2021 3:05 PM  Performed by: Adela Ware MD  Authorized by: Adela Ware MD     ECG reviewed by ED Physician in the absence of a cardiologist: yes    Interpretation:     Interpretation: non-specific    Rate:     ECG rate assessment: normal    Rhythm:     Rhythm: sinus rhythm    Ectopy:     Ectopy: none    QRS:     QRS axis: Normal  ST segments:     ST segments:  Normal  T waves:     T waves: normal    Critical Care  Performed by: Christin Pearson MD  Authorized by: Christin Pearson MD     Critical care provider statement:     Critical care time (minutes):  80    Critical care time was exclusive of:  Separately billable procedures and treating other patients    Critical care was necessary to treat or prevent imminent or life-threatening deterioration of the following conditions:  Circulatory failure and cardiac failure    Critical care was time spent personally by me on the following activities:  Ordering and review of laboratory studies, ordering and review of radiographic studies, pulse oximetry, re-evaluation of patient's condition, examination of patient, evaluation of patient's response to treatment, ordering and performing treatments and interventions, review of old charts and discussions with consultants        Medical Decision Making     I reviewed the patient's most recent Emergency Dept notes and diagnostic tests in formulating my MDM on today's visit. Provider Notes (Medical Decision Making):   75-year-old male smoker with likely lung cancer presenting with chest pain or shortness of breath, lightheadedness at work today, moderate to severe, at one point causing him to urinate on himself. On examination he was ill-appearing, hypotensive, pale. Initial EKG and laboratories were reassuring given his clinical picture. Hypotension did not significantly improve after 1 L normal saline. I administered a second liter bolus and blood pressure did improve. I wrote orders for peripheral norepinephrine but this was never administered given his improved hemodynamics after 2 L of fluid. CTA was obtained to further evaluate for possible pulmonary embolism as the cause of his shortness of breath and hypotension.   He does have a left-sided PE but also has moderate sized pericardial effusion with likely tamponade physiology. I performed a bedside echocardiogram which shows signs of tamponade but at this time his hemodynamics are improved. I consulted cardiothoracic surgery and spoke with Dr. Tenzin Chua who feels that the patient is more appropriate for pericardiocentesis performed by cardiology, rather than need for emergent pericardial window. I spoke with Dr. Jaquan Rodriguez of [address] [place] [address] cardiology group. He recommends stat echocardiogram which is ordered. At this time he agrees with the plan to hold off heparin anticoagulation until cardiology provides further recommendations about pericardiocentesis, after STAT Echo is completed. Elevated lactate is secondary to his hypotension which is secondary to dehydration and pericardial effusion with tamponade physiology. He does not have evidence of infection or sepsis or septic shock. Critical care 80 minutes. Joya Rosado MD  3:03 PM  11/1/2021     Consults:    Social History     Tobacco Use    Smoking status: Current Every Day Smoker     Packs/day: 1.50     Years: 50.00     Pack years: 75.00     Types: Cigarettes    Smokeless tobacco: Never Used    Tobacco comment: electronic cigarettes in the past   Substance Use Topics    Alcohol use:  Yes     Alcohol/week: 3.0 standard drinks     Types: 3 Cans of beer per week     Comment: social -3 drinks weekly--rum    Drug use: No       Medications Administered during ED course:  Medications   NOREPINephrine (LEVOPHED) 8 mg in 5% dextrose 250mL (32 mcg/mL) infusion (has no administration in time range)   0.9% sodium chloride infusion 1,000 mL (0 mL IntraVENous IV Completed 11/1/21 1100)   lactated ringers bolus infusion 1,000 mL (0 mL IntraVENous IV Completed 11/1/21 1236)   methylPREDNISolone (PF) (Solu-MEDROL) injection 125 mg (125 mg IntraVENous Given 11/1/21 1133)   albuterol-ipratropium (DUO-NEB) 2.5 MG-0.5 MG/3 ML (3 mL Nebulization Given 11/1/21 1144)   iopamidoL (ISOVUE-370) 76 % injection 100 mL (80 mL IntraVENous Given 11/1/21 1216)          Prescriptions from today's ED visit:  Current Discharge Medication List         Diagnosis and Disposition     Disposition:  Admitted    Clinical Impression:   1. Acute pericardial effusion    2. Cardiac/pericardial tamponade    3. Lactic acidosis    4. Acute pulmonary embolism without acute cor pulmonale, unspecified pulmonary embolism type [facility])        Attestation:  I personally performed the services described in this documentation on this date 11/1/2021 for patient Romy Cordoba. Elda Gamez MD        I was the first provider for this patient on this visit. To the best of my ability I reviewed relevant prior medical records, electrocardiograms, laboratories, and radiologic studies. The patient's presenting problems were discussed, and the patient was in agreement with the care plan formulated and outlined with them. Elda Gamez MD    Please note that this dictation was completed with Dragon voice recognition software. Quite often unanticipated grammatical, syntax, homophones, and other interpretive errors are inadvertently transcribed by the computer software. Please disregard these errors and excuse any errors that have escaped final proofreading.

## 2021-11-01 NOTE — ED NOTES
Assumed care of pt from triage, pt daughter at bedside. Pt reporting an episode of near syncope, diaphoresis and urinary incontinence  this am about 1 hour ago. After resolution of episode, pt began experiencing contrast mid sternal CP and worsening SOB. Pt has hx of COPD and wears NC 2-4 l/min as needed at baseline     0915 Verbal order received for fluid bolus, initiated. MD at bedside. Per pt, he felt tingling in his legs when he stood up for xray. Additionally, pt reporting lower abdominal pressure, like he \"has to go to the bathroom\" and has a hx of diverticulitis. Pt denies any bloody/dark stools     1145 Per MD, hold presser until MAP < 65     1230 Pt wife requesting to be consulted before pressers initiated     1515 Hospitalist at bedside     1520 Echo at bedside    1540 Patient is being transferred to [address], Room # 21 [phone]. Report given to Sybil Flores on [facility] for routine progression of care. Report consisted of the following information SBAR, Kardex, ED Summary, MAR and Recent Results. Patient transferred to receiving unit by: RN (RN or tech name). Outstanding consults needed: Yes    Next labs due: 0400 am - per hosptalist do not need to recheck lactic     The following personal items will be sent with the patient during transfer to the floor:     All valuables:    Cardiac monitoring ordered: Yes    The following CURRENT information was reported to the receiving RN:    Code status: Full Code at time of transfer    Last set of vital signs:  Vital Signs  Level of Consciousness: Alert (0) (11/01/21 0823)  Temp: 97 °F (36.1 °C) (11/01/21 0823)  Temp Source: Axillary (11/01/21 0823)  Pulse (Heart Rate): 85 (11/01/21 1530)  Cardiac Rhythm: Sinus Rhythm (11/01/21 0909)  Resp Rate: 21 (11/01/21 1530)  BP: 117/87 (11/01/21 1530)  MAP (Monitor): 94 (11/01/21 1530)  MAP (Calculated): 97 (11/01/21 1530)  BP 1 Location: Left upper arm (11/01/21 0929)  BP 1 Method: Automatic (11/01/21 0929)  BP Patient Position: At rest (11/01/21 0929)  MEWS Score: 2 (11/01/21 9046)         Oxygen Therapy  O2 Sat (%): 94 % (11/01/21 1530)  Pulse via Oximetry: 83 beats per minute (11/01/21 1530)  O2 Device: Nasal cannula (11/01/21 1147)  O2 Flow Rate (L/min): 2 l/min (11/01/21 1147)      Last pain assessment:  Pain 1  Pain Scale 1: Numeric (0 - 10)  Pain Intensity 1: 5      Wounds: No     Urinary catheter: voiding  Is there a estrada order: No     LDAs:       Peripheral IV 11/01/21 Left Antecubital (Active)       Peripheral IV 11/01/21 Right Antecubital (Active)   Site Assessment Clean, dry, & intact 11/01/21 1238   Phlebitis Assessment 0 11/01/21 1238   Infiltration Assessment 0 11/01/21 1238   Dressing Status Clean, dry, & intact 11/01/21 1238   Dressing Type Transparent 11/01/21 1238   Hub Color/Line Status Pink 11/01/21 1238   Action Taken Blood drawn 11/01/21 1238   Alcohol Cap Used No 11/01/21 1238         Opportunity for questions and clarification was provided. Wilton Obregon RN    7319 VCS at bedside.

## 2021-11-01 NOTE — PROGRESS NOTES
Received consult for this patient. Noted that he has most recently been followed by Dr. Rose Marie Tena of [facility]. Discussed with Dr. Diego Salguero who will pass the consult on to S.

## 2021-11-01 NOTE — H&P
Hospitalist Admission Note NAME: Miri Diaz :  1950 MRN:  [id number] Date/Time:  2021 3:13 PM 
 
Patient PCP: Cordell Taylor, DO 
______________________________________________________________________ Given the patient's current clinical presentation, I have a high level of concern for decompensation if discharged from the emergency department. Complex decision making was performed, which includes reviewing the patient's available past medical records, laboratory results, and x-ray films. My assessment of this patient's clinical condition and my plan of care is as follows. Assessment / Plan: 
Pericardial effusion with tamponade physiology Pulmonary Embolism Admit to [facility] Monitor BP closely - order was placed in EMR for Levophed but was never given. BP improved with IVF. Keep NPO for now. Stat official echocardiogram 
ER spoke with Cardiothoracic Surgery and as per my discussion with the ER MD no surgical intervention is planned at this time. Official CTS note is pending. Cardiology Consult requested - ER awaiting call back Pt will require AC for PE, however will await results of discussion with Cardiology in order to determine optimal timing as pericardiocentesis is likely planned Trop negative, EKG shows NSR 
VSS 
 
CTA Chest: IMPRESSION 1. Positive for pulmonary embolism of left lower lobe pulmonary arteries. 2. Moderate-sized pericardial effusion which appears hemodynamically 
significant. 3. 3 right lung nodules appearing unchanged in the interval. 
4. Moderate to severe emphysema. Addendum: 
Spoke with Dr. West Arredondo who states echocardiogram did not reveal evidence of tamponade and therefore did not require emergent percardiocentesis. Therefore agree with starting Heparin drip. COPD, not in exacerbation Chronic hypoxic respiratory failure, secondary to above, on home 2-4L NC Active smoker HTN Nebs as needed Code Status: Full 
Surrogate Decision Maker: wife DVT Prophylaxis: SCD 
GI Prophylaxis: not indicated Baseline: Independent Subjective: CHIEF COMPLAINT: \"felt like I was going to pass out, pissed on myself\" HISTORY OF PRESENT ILLNESS:    
Logan Brandt is a 66 y.o.  male who presents with CC listed above. Pt states that he was feeling at his baseline early this morning when he awoke. 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 He went there early this morning at 5:30am and was doing well. He states that he was sitting on his stool, drinking water, and smoking a cigar when he started to feel \"woozy. \" He states that he felt like he \"was going to pass out. \" He then went and got another attendant at the store to call EMS. He endorses an episode of urinary incontinence. He denies any syncope. He endorses some mid sternal CP at around that time and some diaphoresis as well. Since his arrival to the ER, he has felt well. He denies any similar previous episodes. We were asked to admit for work up and evaluation of the above problems. Past Medical History:  
Diagnosis Date  Chronic obstructive pulmonary disease ([address]  Diverticulitis  Hypertension  Ill-defined condition   
 kidney stones/ stant placed Past Surgical History:  
Procedure Laterality Date  COLONOSCOPY N/A 11/30/2016 COLONOSCOPY performed by Alton Contreras MD at [facility] ENDOSCOPY  COLONOSCOPY,KADI DUARTE/CAUTERY  11/30/2016  HX BACK SURGERY    
 plate to upper neck  HX CERVICAL FUSION    
 HX ORTHOPAEDIC    
 left ring finger-trauma  HX ORTHOPAEDIC    
 right index finger  HX ORTHOPAEDIC  12/2/14 Bilateral total knee replacements  HX TONSILLECTOMY  HX UROLOGICAL    
 kidney stone extraction/stent Social History Tobacco Use  Smoking status: Current Every Day Smoker Packs/day: 1.50 Years: 50.00 Pack years: 75.00   Types: Cigarettes  Smokeless tobacco: Never Used  Tobacco comment: electronic cigarettes in the past  
Substance Use Topics  Alcohol use: Yes Alcohol/week: 3.0 standard drinks Types: 3 Cans of beer per week Comment: social -3 drinks weekly--rum Family History Problem Relation Age of Onset  No Known Problems Mother  Emphysema Father No Known Allergies Prior to Admission medications Medication Sig Start Date End Date Taking? Authorizing Provider Gerard Almazan 100-62.5-25 mcg inhaler INHALE 1 PUFF BY MOUTH DAILY 7/25/21  Yes Alban Zhu III, DO  
albuterol (PROVENTIL HFA, VENTOLIN HFA, PROAIR HFA) 90 mcg/actuation inhaler Take 2 Puffs by inhalation every six (6) hours as needed for Wheezing. Patient not taking: Reported on 11/1/2021 10/29/19   Erica VERA III, DO  
 
 
REVIEW OF SYSTEMS:    
I am not able to complete the review of systems because: The patient is intubated and sedated The patient has altered mental status due to his acute medical problems The patient has baseline aphasia from prior stroke(s) The patient has baseline dementia and is not reliable historian The patient is in acute medical distress and unable to provide information Total of 12 systems reviewed as follows:   
   POSITIVE= underlined text  Negative = text not underlined General:  fever, chills, sweats, generalized weakness, weight loss/gain,  
   loss of appetite Eyes:    blurred vision, eye pain, loss of vision, double vision ENT:    rhinorrhea, pharyngitis Respiratory:   cough, sputum production, SOB, ROSARIO, wheezing, pleuritic pain  
Cardiology:   chest pain, palpitations, orthopnea, PND, edema, syncope Gastrointestinal:  abdominal pain , N/V, diarrhea, dysphagia, constipation, bleeding Genitourinary:  frequency, urgency, dysuria, hematuria, incontinence Muskuloskeletal :  arthralgia, myalgia, back pain Hematology:  easy bruising, nose or gum bleeding, lymphadenopathy Dermatological: rash, ulceration, pruritis, color change / jaundice Endocrine:   hot flashes or polydipsia Neurological:  headache, dizziness, confusion, focal weakness, paresthesia, Speech difficulties, memory loss, gait difficulty Psychological: Feelings of anxiety, depression, agitation Objective: VITALS:   
Visit Vitals BP (!) 115/91 Pulse 89 Temp 97 °F (36.1 °C) Resp 21 Ht 5' 9\" (1.753 m) Wt 75.4 kg (166 lb 3.6 oz) SpO2 91% BMI 24.55 kg/m² PHYSICAL EXAM: 
 
General:    Alert, cooperative, NAD HEENT: Atraumatic, anicteric sclerae, pink conjunctivae No oral ulcers, mucosa moist, throat clear, dentition fair Neck:  Supple, symmetrical 
Lungs:   Fair air entry, on 2L NC. Chest wall:  No tenderness  No Accessory muscle use. Heart:   Regular  rhythm,  No  murmur   No edema Abdomen:   Soft, non-tender. Not distended. Bowel sounds normal 
Extremities: No cyanosis. +clubbing,   
  Skin turgor normal, Capillary refill normal, Radial dial pulse 2+ Skin:     Not pale. Not Jaundiced  No rashes Psych:  Good insight. Not depressed. Not anxious or agitated. Neurologic: EOMs intact. No facial asymmetry. No aphasia or slurred speech. Symmetrical strength, Sensation grossly intact. Alert and oriented X 4.  
 
_______________________________________________________________________ Care Plan discussed with: 
  Comments Patient x Family RN x Care Manager Consultant:     
_______________________________________________________________________ Expected  Disposition:  
Home with Family HH/PT/OT/RN x  
SNF/LTC   
SAMUEL   
________________________________________________________________________ TOTAL TIME:  55 Minutes Critical Care Provided     Minutes non procedure based Comments   Reviewed previous records  
>50% of visit spent in counseling and coordination of care  Discussion with patient and/or family and questions answered 
  
 
________________________________________________________________________ Signed: Shalini Garcia MD 
 
Procedures: see electronic medical records for all procedures/Xrays and details which were not copied into this note but were reviewed prior to creation of Plan. LAB DATA REVIEWED:   
Recent Results (from the past 24 hour(s)) EKG, 12 LEAD, INITIAL Collection Time: 11/01/21  8:29 AM  
Result Value Ref Range Ventricular Rate 89 BPM  
 Atrial Rate 89 BPM  
 P-R Interval 138 ms QRS Duration 86 ms  
 Q-T Interval 360 ms QTC Calculation (Bezet) 438 ms Calculated P Axis 74 degrees Calculated R Axis -13 degrees Calculated T Axis 71 degrees Diagnosis Normal sinus rhythm Possible Left atrial enlargement Confirmed by Natalia Diamond MD, Mary Clause ([id number]) on 11/1/2021 10:24:20 AM 
  
CBC WITH AUTOMATED DIFF Collection Time: 11/01/21  8:36 AM  
Result Value Ref Range WBC 8.2 4.1 - 11.1 K/uL  
 RBC 4.68 4.10 - 5.70 M/uL  
 HGB 15.3 12.1 - 17.0 g/dL HCT 48.3 36.6 - 50.3 % .2 (H) 80.0 - 99.0 FL  
 MCH 32.7 26.0 - 34.0 PG  
 MCHC 31.7 30.0 - 36.5 g/dL  
 RDW 15.6 (H) 11.5 - 14.5 % PLATELET 057 815 - 533 K/uL MPV 10.7 8.9 - 12.9 FL  
 NRBC 0.0 0  WBC ABSOLUTE NRBC 0.00 0.00 - 0.01 K/uL NEUTROPHILS 80 (H) 32 - 75 % LYMPHOCYTES 11 (L) 12 - 49 % MONOCYTES 6 5 - 13 % EOSINOPHILS 3 0 - 7 % BASOPHILS 0 0 - 1 % IMMATURE GRANULOCYTES 0 0.0 - 0.5 % ABS. NEUTROPHILS 6.6 1.8 - 8.0 K/UL  
 ABS. LYMPHOCYTES 0.9 0.8 - 3.5 K/UL  
 ABS. MONOCYTES 0.5 0.0 - 1.0 K/UL  
 ABS. EOSINOPHILS 0.2 0.0 - 0.4 K/UL  
 ABS. BASOPHILS 0.0 0.0 - 0.1 K/UL  
 ABS. IMM. GRANS. 0.0 0.00 - 0.04 K/UL  
 DF AUTOMATED METABOLIC PANEL, COMPREHENSIVE Collection Time: 11/01/21  8:36 AM  
Result Value Ref Range Sodium 142 136 - 145 mmol/L Potassium 3.6 3.5 - 5.1 mmol/L Chloride 106 97 - 108 mmol/L  
 CO2 33 (H) 21 - 32 mmol/L  Anion gap 3 (L) 5 - 15 mmol/L Glucose 174 (H) 65 - 100 mg/dL BUN 17 6 - 20 MG/DL Creatinine 1.16 0.70 - 1.30 MG/DL  
 BUN/Creatinine ratio 15 12 - 20 GFR est AA >60 >60 ml/min/1.73m2 GFR est non-AA >60 >60 ml/min/1.73m2 Calcium 9.0 8.5 - 10.1 MG/DL Bilirubin, total 0.8 0.2 - 1.0 MG/DL  
 ALT (SGPT) 36 12 - 78 U/L  
 AST (SGOT) 43 (H) 15 - 37 U/L Alk. phosphatase 104 45 - 117 U/L Protein, total 6.4 6.4 - 8.2 g/dL Albumin 3.4 (L) 3.5 - 5.0 g/dL Globulin 3.0 2.0 - 4.0 g/dL A-G Ratio 1.1 1.1 - 2.2    
TROPONIN-HIGH SENSITIVITY Collection Time: 11/01/21  8:36 AM  
Result Value Ref Range Troponin-High Sensitivity 27 0 - 76 ng/L  
POC LACTIC ACID Collection Time: 11/01/21 12:07 PM  
Result Value Ref Range Lactic Acid (POC) 2.59 (HH) 0.40 - 2.00 mmol/L POC LACTIC ACID Collection Time: 11/01/21  2:55 PM  
Result Value Ref Range  Lactic Acid (POC) 2.14 (HH) 0.40 - 2.00 mmol/L

## 2021-11-01 NOTE — PROGRESS NOTES
Pharmacy Medication Reconciliation The patient was interviewed regarding current PTA medication list, use and drug allergies;  wife Travon Engle) present in room and obtained permission from patient to discuss drug regimen with visitor(s) present. The patient was questioned regarding use of any other inhalers, topical products, over the counter medications, herbal medications, vitamin products or ophthalmic/nasal/otic medication use. Allergy Update: Patient has no known allergies. Recommendations/Findings: The following amendments were made to the patient's active medication list on file at [facility]:  
1) Additions: NA 
 
2) Deletions: duo-nebs, furosemide 20 mg daily 3) Changes: NA 
 
 
Pertinent Findings: NA 
 
Clarified PTA med list with patient, rx query. PTA medication list was corrected to the following:  
 
Prior to Admission Medications Prescriptions Last Dose Informant Taking? Trelegy Ellipta 100-62.5-25 mcg inhaler 10/31/2021 at Unknown time  Yes Sig: INHALE 1 PUFF BY MOUTH DAILY  
albuterol (PROVENTIL HFA, VENTOLIN HFA, PROAIR HFA) 90 mcg/actuation inhaler Not Taking at Unknown time  No  
Sig: Take 2 Puffs by inhalation every six (6) hours as needed for Wheezing. Patient not taking: Reported on 11/1/2021 Facility-Administered Medications: None Thank you, Flaca Aguilar, SALLIED

## 2021-11-02 NOTE — PROGRESS NOTES
Progress Note 
 
 
11/2/2021 10:56 AM 
NAME: Alysha Haile MRN:  [id number] Admit Diagnosis: Cardiac tamponade [I31.4] Primary Cardiologist: Dr Surjit Garcia Physician Requesting consult: Dr Shama Woods  
  
Assessment: 
  
  
Pulmonary embolism Pericardial effusion - moderate effusion without hemodynamic compromise on echo Pulmonary nodule - suspected malignant as they were not present on scan in 2019 (seen by Dr Daniela Jalloh at [facility] and discussed in tumor board- empiric treatment, biopsy and observation was discussed) COPD/ Emphysema - Follows with Dr Blake Randolph Diastolic heart failure HTN Pulmonary HTN, PA pressure 56 on previous echo Dilated ascending aorta 4.5 cm Smoking    
  
   
  
Recommendations: 
  
Patient presented with pulmonary embolism and also has moderate pericardial effusion without hemodynamic compromise (BP and HR stable, no RV collapse on echo(  
Discussed with patient about indications for pericardiocentesis - diagnostic and therapeutic He has lung nodules and ? Not confirmed but suspected lung malignancy. With PE and pericardial effusion there would be concern for malignancy. I have discussed with patient about Pericardiocentesis for diagnostic purpose but he is not sure if he wants to pursue that. But he would think about it. Will plan for pericardiocentesis tomorrow if patient agrees.  
  
Cont on heparin gtt Cont to monitor hemodynamics Will cont to follow  
  Thank you for this consult and allowing me to take part in this patients care. Please call with questions. 
  
   
  
 [x]? High complexity decision making was performed 
  
 
 
Subjective: HPI: 
 no CP or SOB  
 
ROS: No CP, SOB, Abd pain, nausea, vomiting, syncope, palpitations, new focal neurological symptoms Objective:  
  
Physical Exam: 
 
Last 24hrs VS reviewed since prior progress note. Most recent are: 
 
Visit Vitals BP (!) 143/101 (BP 1 Location: Left upper arm, BP Patient Position: At rest) Pulse 69 Temp 98 °F (36.7 °C) Resp 19 Ht 5' 9\" (1.753 m) Wt 75.3 kg (166 lb) SpO2 93% BMI 24.51 kg/m² Intake/Output Summary (Last 24 hours) at 11/2/2021 1056 Last data filed at 11/2/2021 2535 Gross per 24 hour Intake 2000 ml Output 425 ml Net 1575 ml General: Alert and oriented x3, no acute distress Neck: Supple Respiratory: No respiratory distress, clear lung sound Cardiovascular: Regular rate rhythm, S1S2, no murmur Abdomen: soft, non tender, non distended Neuro: moves all extremities, oriented x3 Skin: warm and dry Extremity: no edema, warm to touch Data Review Telemetry: normal sinus rhythm Lab Data Personally Reviewed: 
 
Recent Labs 11/02/21 
0249 11/01/21 
1714 WBC 9.3 8.4 HGB 13.1 14.2 HCT 41.5 43.7 * 151 Recent Labs 11/02/21 
8342 11/02/21 0243 11/01/21 
2332 APTT 51.7* 59.8* >130.0* Recent Labs 11/02/21 
0249 11/01/21 
2471  142  
K 4.4 3.6  106 CO2 28 33* BUN 25* 17  
CREA 1.23 1.16  
* 174* CA 9.1 9.0 MG 2.0  -- No results for input(s): CPK, CKNDX, TROIQ in the last 72 hours. No lab exists for component: CPB Lab Results Component Value Date/Time Cholesterol, total 205 (H) 05/10/2021 09:53 AM  
 HDL Cholesterol 72 05/10/2021 09:53 AM  
 LDL, calculated 114.4 (H) 05/10/2021 09:53 AM  
 Triglyceride 93 05/10/2021 09:53 AM  
 CHOL/HDL Ratio 2.8 05/10/2021 09:53 AM  
 
 
Recent Labs 11/02/21 
0249 11/01/21 
5278 AP 91 104  
TP 5.7* 6.4 ALB 3.0* 3.4*  
GLOB 2.7 3.0 No results for input(s): PH, PCO2, PO2 in the last 72 hours. Medications Personally Reviewed: 
 
Current Facility-Administered Medications Medication Dose Route Frequency  ipratropium (ATROVENT) 0.02 % nebulizer solution 0.5 mg  0.5 mg Nebulization Q6H RT  
 budesonide (PULMICORT) 250 mcg/2ml nebulizer susp  250 mcg Nebulization BID RT  
 arformoteroL (BROVANA) neb solution 15 mcg  15 mcg Nebulization BID RT  
 NOREPINephrine (LEVOPHED) 8 mg in 5% dextrose 250mL (32 mcg/mL) infusion  0.5-16 mcg/min IntraVENous TITRATE  sodium chloride (NS) flush 5-40 mL  5-40 mL IntraVENous Q8H  
 sodium chloride (NS) flush 5-40 mL  5-40 mL IntraVENous PRN  
 acetaminophen (TYLENOL) tablet 650 mg  650 mg Oral Q6H PRN Or  
 acetaminophen (TYLENOL) suppository 650 mg  650 mg Rectal Q6H PRN  polyethylene glycol (MIRALAX) packet 17 g  17 g Oral DAILY PRN  
 ondansetron (ZOFRAN ODT) tablet 4 mg  4 mg Oral Q8H PRN Or  
 ondansetron (ZOFRAN) injection 4 mg  4 mg IntraVENous Q6H PRN  
 heparin 25,000 units in D5W 250 ml infusion  18-36 Units/kg/hr IntraVENous TITRATE  heparin (porcine) 1,000 unit/mL injection 3,010 Units  40 Units/kg IntraVENous PRN Or  
 heparin (porcine) 1,000 unit/mL injection 6,020 Units  80 Units/kg IntraVENous PRN Art Hollering, MD

## 2021-11-02 NOTE — PROGRESS NOTES
Bedside shift change report given to Mariajose Mills RN (oncoming nurse) by Mariann Mandel RN (offgoing nurse). Report included the following information SBAR, Kardex, Intake/Output, MAR and Cardiac Rhythm NSR. End of Shift Note Bedside shift change report given to ESTEFANIA Estrada (oncoming nurse) by Natasha Zarate RN (offgoing nurse). Report included the following information SBAR, Kardex, Intake/Output, MAR and Cardiac Rhythm NSR Shift worked:  7a-7p Shift summary and any significant changes:  
  none Concerns for physician to address:  plan? Zone phone for oncoming shift:    
  
 
Activity: 
Activity Level: Up with Assistance Number times ambulated in hallways past shift: 0 Number of times OOB to chair past shift: 3 Cardiac:  
Cardiac Monitoring: Yes    
Cardiac Rhythm: Sinus Rhythm Access:  
Current line(s): PIV Genitourinary:  
Urinary status: voiding Respiratory:  
O2 Device: Nasal cannula Chronic home O2 use?: YES Incentive spirometer at bedside: NO 
  
GI: 
Last Bowel Movement Date: 11/01/21 Current diet:  ADULT DIET Regular; Low Fat/Low Chol/High Fiber/2 gm Na Passing flatus: YES Tolerating current diet: YES Pain Management:  
Patient states pain is manageable on current regimen: YES Skin: 
Ender Score: 20 Interventions: limit briefs Patient Safety: 
Fall Score: Total Score: 2 Interventions: bed/chair alarm and assistive device (walker, cane, etc) High Fall Risk: Yes Length of Stay: 
Expected LOS: 2d 14h Actual LOS: 1 Natasha Zarate RN

## 2021-11-02 NOTE — PROGRESS NOTES
Problem: Pressure Injury - Risk of 
Goal: *Prevention of pressure injury Description: Document Ender Scale and appropriate interventions in the flowsheet. Outcome: Progressing Towards Goal 
Note: Pressure Injury Interventions: Activity Interventions: Assess need for specialty bed, Increase time out of bed Mobility Interventions: Assess need for specialty bed Nutrition Interventions: Document food/fluid/supplement intake Problem: Patient Education: Go to Patient Education Activity Goal: Patient/Family Education Outcome: Progressing Towards Goal 
  
Problem: Falls - Risk of 
Goal: *Absence of Falls Description: Document Shayan Wilkes Fall Risk and appropriate interventions in the flowsheet. Outcome: Progressing Towards Goal 
Note: Fall Risk Interventions: 
  
 
  
 
Medication Interventions: Bed/chair exit alarm, Evaluate medications/consider consulting pharmacy, Patient to call before getting OOB Elimination Interventions: Call light in reach, Bed/chair exit alarm, Patient to call for help with toileting needs History of Falls Interventions: Bed/chair exit alarm, Room close to nurse's station

## 2021-11-02 NOTE — PROGRESS NOTES
0934 aPTT level sent to lab. 
 
1106 aPTT result 51.7. Confirmed Hep gtt rate increase by 1 unit/kg/hr per eMAR orders with Tereso Sever from Pharmacy. Hep gtt rate change done and increased to 16 units/kg/hr and double-verified with Angelene Lennox RN. 1200 Bedside shift change report given to Angelene Lennox RN (oncoming nurse) by Casa Maurer (offgoing nurse). Report included the following information SBAR, Kardex, Procedure Summary, Intake/Output, MAR, Recent Results and Cardiac Rhythm NSR.

## 2021-11-02 NOTE — PROGRESS NOTES
Transition of Care Plan: 
 
RUR:  13% Disposition: Home with spouse Follow up appointments:  PCP, Specialists DME needed: Pt has home o2 through [address] Transportation at Discharge:  Pt's spouse will transport at d/c. [place] or means to access home:  yes IM Medicare Letter: needed at d/c Is patient a BCPI-A Bundle:  n/a If yes, was Bundle Letter given?:    
Caregiver Contact:  Antoine Narvaez  [phone] Discharge Caregiver contacted prior to discharge? CM will contact at d/c if pt desires. Reason for Admission:  Cardiac Tamponade RUR Score:    13% Plan for utilizing home health:   As needed PCP: First and Last name:  Radha Yeung DO Name of Practice:  
 Are you a current patient: Yes/No: yes Approximate date of last visit: a month ago Can you participate in a virtual visit with your PCP: Pt prefers in office. Current Advanced Directive/Advance Care Plan: Full Code Advance Care Planning General Advance Care Planning (ACP) Conversation Date of Conversation: 11/2/21 Conducted with: Patient with Decision Making Capacity Healthcare Decision Maker: No healthcare decision makers have been documented. Click here to complete [address] including selection of the Healthcare Decision Maker Relationship (ie \"Primary\") Content/Action Overview: Has ACP document(s) on file - reflects the patient's care preferences Reviewed DNR/DNI and patient elects Full Code (Attempt Resuscitation) Length of Voluntary ACP Conversation in minutes:  <16 minutes (Non-Billable) Bishnu Mclean Transition of Care Plan:   Home with spouse CM met with pt at bedside to introduce self/role, verify demographics, insurance and PCP. CM also discussed d/c plan.    
 
Pt is a 67 yo, ,  male who was admitted to [address] [place] [address] on 11/1/21 with the admitting dx of Cardiac Tamponade. Pt sees his PCP every six months. Pt uses the [address] Pt lives with his spouse in a tri-level home with 3 SRUTHI. Pt has two supportive daughters. Pt works full time in sales. Pt has home o2 through [address]. Pt does drive. Pt can complete his ADLs/IADLs independently at baseline. Pt denied a hx of HH, SNF or IPR. Pt's spouse will transport at d/c. CM will continue to assess for d/c needs. Care Management Interventions PCP Verified by CM: Yes (Pt sees Dr. Tahir Cheng. ) Palliative Care Criteria Met (RRAT>21 & CHF Dx)?: No 
Mode of Transport at Discharge: Other (see comment) (spouse) Transition of Care Consult (CM Consult): Discharge Planning Discharge Durable Medical Equipment: No (Pt has home o2.) Physical Therapy Consult: No 
Occupational Therapy Consult: No 
Speech Therapy Consult: No 
Support Systems: Spouse/Significant Other, Child(celena) Confirm Follow Up Transport: Self The Patient and/or Patient Representative was Provided with a Choice of Provider and Agrees with the Discharge Plan?: Yes Name of the Patient Representative Who was Provided with a Choice of Provider and Agrees with the Discharge Plan: Tanya Oseguera Discharge Location Discharge Placement: Home with family assistance PAULIE Kelley Care Management, [place] [phone]

## 2021-11-02 NOTE — PROGRESS NOTES
Hospitalist Progress Note NAME: Adi Juárez :  1950 MRN:  [id number] Assessment / Plan: 
 
Pericardial effusion - moderate Pulmonary Embolism 
-Presented with presyncopal symptom (CP, diaphoresis, about to pass out), an episode of urinary incontinence.   
-Hypotensive in ED 87/59 
-Trop -ve x 1 
-EKG: NSR, no QT prolongation, T wave inversion in V1-V2 
-Echo: EF 55-60%, moderate pericardial effusion, severely elevated CVP 
-CTA chest: pulmonary embolism of left lower lobe pulmonary arteries. Mod-severe emphysema, stable 3 lung nodules, mod pericardial effusion 
-Continue heparin gtt 
-Monitor blood pressure closely in stepdown 
-Cardiology consulted. Per cards no indication for therapeutic pericardiocentesis at this time as vital signs have relatedly stable but will likely need diagnostic pericardiocentesis given lung nodules and PE. Appreciate cardiology input 
-Pt undecided yet if he wants to proceed with pericardiocentesis. Pulmonary nodules 
-CT chest shows 9 mm RUL nodule, 9 mm. Viral RUL nodule, 10 mm RLL nodule. Reportedly all unchanged in size. Per cardiology notes this is new since 2019 
-Current everyday smoker 
-S/p PET scanning in 2021: Mildly hypermetabolic nodule RUL and minimally hypermetabolic nodule perihilar right upper lobe. These findings are nonspecific and could represent any number of inflammatory or neoplastic etiologies. Small noduleRLL does not demonstrate increased tracer activity AAA  
-4.5 CM ascending aorta aneurysm 
-Counseled about smoking cessation and need for follow up Macrocytosis 
-Check B12 and folate COPD, not in exacerbation Chronic hypoxic respiratory failure, secondary to above, on home 2-4L NC Active smoker HTN Nebs as needed 18.5 - 24.9 Normal weight / Body mass index is 24.51 kg/m². Estimated discharge date:  Barriers: 
 
Code Status: Full Surrogate Decision Maker: wife DVT Prophylaxis: SCD 
GI Prophylaxis: not indicated Baseline: Independent Subjective: Chief Complaint / Reason for Physician Visit Discussed with RN events overnight. Reports feeling fine. Denies chest pain or shortness of breath Review of Systems: 
Symptom Y/N Comments  Symptom Y/N Comments Fever/Chills    Chest Pain Poor Appetite    Edema Cough    Abdominal Pain Sputum    Joint Pain SOB/ROSARIO    Pruritis/Rash Nausea/vomit    Tolerating PT/OT Diarrhea    Tolerating Diet Constipation    Other Could NOT obtain due to:   
 
Objective: VITALS:  
Last 24hrs VS reviewed since prior progress note. Most recent are: 
Patient Vitals for the past 24 hrs: 
 Temp Pulse Resp BP SpO2  
11/02/21 0744 98 °F (36.7 °C) 69 19 (!) 143/101 93 % 11/02/21 0400  67     
11/02/21 0311 98.1 °F (36.7 °C) 78 23 107/67 92 % 11/01/21 2346  83     
11/01/21 2329 98.2 °F (36.8 °C) 71 22 111/79 94 % 11/01/21 2000  76     
11/01/21 1924 98.4 °F (36.9 °C) 76 25 105/75 94 % 11/01/21 1627 98.2 °F (36.8 °C) 73 20 122/82 96 % 11/01/21 1600  82 15 101/82 97 % 11/01/21 [address]  
11/01/21 1530  85 21 117/87 94 % 11/01/21 1515  81 19 (!) 115/91 98 % 11/01/21 1500  89 21 (!) 115/91 91 % 11/01/21 1445    115/66   
11/01/21 1322  87 21 110/74 96 % 11/01/21 1230  86 21 108/76 90 % 11/01/21 1229  86 22 115/75 95 % 11/01/21 1147     95 % 11/01/21 1130    96/75   
11/01/21 1115  79 17 90/69 94 % 11/01/21 1045  84 18 (!) 79/62 95 % 11/01/21 0945  80 16 (!) 84/65 94 % 11/01/21 0931  81 21 (!) 81/60 96 % 11/01/21 0929    (!) 81/60   
11/01/21 0918  82 16 (!) 78/55 98 % 11/01/21 0823 97 °F (36.1 °C) 87 18 (!) 87/59 100 % Intake/Output Summary (Last 24 hours) at 11/2/2021 6222 Last data filed at 11/2/2021 5269 Gross per 24 hour Intake 2000 ml Output 200 ml Net 1800 ml I had a face to face encounter and independently examined this patient on 11/2/2021, as outlined below: PHYSICAL EXAM: 
General: WD, WN. Alert, cooperative, no acute distress EENT:  EOMI. Anicteric sclerae. MMM Resp:  CTA bilaterally, no wheezing or rales. No accessory muscle use CV:  Regular  rhythm,  No edema GI:  Soft, Non distended, Non tender. +Bowel sounds Neurologic:  Alert and oriented X 3, normal speech, Psych:   Good insight. Not anxious nor agitated Skin:  No rashes. No jaundice Reviewed most current lab test results and cultures  YES Reviewed most current radiology test results   YES Review and summation of old records today    NO Reviewed patient's current orders and MAR    YES 
PMH/SH reviewed - no change compared to H&P 
________________________________________________________________________ Care Plan discussed with: 
  Comments Patient x Family RN x Care Manager Consultant Multidiciplinary team rounds were held today with , nursing, pharmacist and clinical coordinator. Patient's plan of care was discussed; medications were reviewed and discharge planning was addressed. ________________________________________________________________________ Total NON critical care TIME: 35  Minutes Total CRITICAL CARE TIME Spent:   Minutes non procedure based Comments >50% of visit spent in counseling and coordination of care x   
________________________________________________________________________ Sosa Jean MD  
 
Procedures: see electronic medical records for all procedures/Xrays and details which were not copied into this note but were reviewed prior to creation of Plan. LABS: 
I reviewed today's most current labs and imaging studies. Pertinent labs include: 
Recent Labs 11/02/21 0249 11/01/21 
1714 11/01/21 
5244 WBC 9.3 8.4 8.2 HGB 13.1 14.2 15.3 HCT 41.5 43.7 48.3 * 151 166 Recent Labs 11/02/21 0249 11/01/21 
6206  142 K 4.4 3.6  106 CO2 28 33* * 174* BUN 25* 17  
CREA 1.23 1.16  
CA 9.1 9.0 MG 2.0  --   
ALB 3.0* 3.4* TBILI 0.8 0.8 ALT 38 36 Signed: Greg Marie MD

## 2021-11-03 NOTE — PROGRESS NOTES
Progress Note      11/3/2021 10:56 AM  NAME: Evelyne Philip   MRN:  [id number]   Admit Diagnosis: Cardiac tamponade [I31.4]    Primary Cardiologist: Dr Eleanor Duarte   Physician Requesting consult: Dr Adalberto Leal      Assessment:        Pulmonary embolism   Pericardial effusion - moderate effusion without hemodynamic compromise on echo   Pulmonary nodule - suspected malignant as they were not present on scan in 2019 (seen by Dr Michael Bui at [facility] and discussed in tumor board- empiric treatment, biopsy and observation was discussed)   COPD/ Emphysema - Follows with Dr Amanda Stark   Diastolic heart failure   HTN  Pulmonary HTN, PA pressure 56 on previous echo   Dilated ascending aorta 4.5 cm   Smoking               Recommendations:     Patient presented with pulmonary embolism and also has moderate pericardial effusion without hemodynamic compromise (BP and HR stable, no RV collapse on echo0   Discussed with patient about indications for pericardiocentesis - diagnostic and therapeutic. He has lung nodules and ? Not confirmed but suspected lung malignancy. With PE and pericardial effusion there would be concern for malignancy. I have discussed with patient about Pericardiocentesis for diagnostic purpose. Planned for pericardiocentesis this afternoon but patient refused,. He says he does not want to go through that. I have told him that if pericardial effusion gets worse and cause tamponade than it may lower blood pressure and people can die from that. He understood but just does not want to go through procedure. We discussed about repeat limited echo to reassess effusion      Cont on heparin gtt for PE  Cont to monitor hemodynamics   Will cont to follow      Thank you for this consult and allowing me to take part in this patients care. Please call with questions.             [x]?         High complexity decision making was performed         Subjective:     HPI:   no CP or SOB     ROS: No CP, SOB, Abd pain, nausea, vomiting, syncope, palpitations, new focal neurological symptoms     Objective:      Physical Exam:    Last 24hrs VS reviewed since prior progress note. Most recent are:    Visit Vitals  /69   Pulse 86   Temp 98.3 °F (36.8 °C)   Resp 20   Ht 5' 9\" (1.753 m)   Wt 75.3 kg (166 lb)   SpO2 95%   BMI 24.51 kg/m²       Intake/Output Summary (Last 24 hours) at 11/3/2021 0845  Last data filed at 11/3/2021 0726  Gross per 24 hour   Intake 240 ml   Output 525 ml   Net -285 ml           General: Alert and oriented x3, no acute distress   Neck: Supple   Respiratory: No respiratory distress, clear lung sound   Cardiovascular: Regular rate rhythm, S1S2, no murmur   Abdomen: soft, non tender, non distended   Neuro: moves all extremities, oriented x3   Skin: warm and dry   Extremity: no edema, warm to touch        Data Review    Telemetry: normal sinus rhythm          Lab Data Personally Reviewed:    Recent Labs     11/03/21 0042 11/02/21 0249   WBC 9.0 9.3   HGB 11.7* 13.1   HCT 36.1* 41.5   * 149*     Recent Labs     11/03/21  0042 11/02/21  1652 11/02/21  0927   APTT 90.4* 47.4* 51.7*      Recent Labs     11/02/21 0249 11/01/21  0836    142   K 4.4 3.6    106   CO2 28 33*   BUN 25* 17   CREA 1.23 1.16   * 174*   CA 9.1 9.0   MG 2.0  --      No results for input(s): CPK, CKNDX, TROIQ in the last 72 hours. No lab exists for component: CPKMB  Lab Results   Component Value Date/Time    Cholesterol, total 205 (H) 05/10/2021 09:53 AM    HDL Cholesterol 72 05/10/2021 09:53 AM    LDL, calculated 114.4 (H) 05/10/2021 09:53 AM    Triglyceride 93 05/10/2021 09:53 AM    CHOL/HDL Ratio 2.8 05/10/2021 09:53 AM       Recent Labs     11/02/21  0249 11/01/21  0836   AP 91 104   TP 5.7* 6.4   ALB 3.0* 3.4*   GLOB 2.7 3.0     No results for input(s): PH, PCO2, PO2 in the last 72 hours.     Medications Personally Reviewed:    Current Facility-Administered Medications   Medication Dose Route Frequency    ipratropium (ATROVENT) 0.02 % nebulizer solution 0.5 mg  0.5 mg Nebulization Q6H RT    budesonide (PULMICORT) 250 mcg/2ml nebulizer susp  250 mcg Nebulization BID RT    arformoteroL (BROVANA) neb solution 15 mcg  15 mcg Nebulization BID RT    sodium chloride (NS) flush 5-40 mL  5-40 mL IntraVENous Q8H    sodium chloride (NS) flush 5-40 mL  5-40 mL IntraVENous PRN    acetaminophen (TYLENOL) tablet 650 mg  650 mg Oral Q6H PRN    Or    acetaminophen (TYLENOL) suppository 650 mg  650 mg Rectal Q6H PRN    polyethylene glycol (MIRALAX) packet 17 g  17 g Oral DAILY PRN    ondansetron (ZOFRAN ODT) tablet 4 mg  4 mg Oral Q8H PRN    Or    ondansetron (ZOFRAN) injection 4 mg  4 mg IntraVENous Q6H PRN    heparin 25,000 units in D5W 250 ml infusion  18-36 Units/kg/hr IntraVENous TITRATE    heparin (porcine) 1,000 unit/mL injection 3,010 Units  40 Units/kg IntraVENous PRN    Or    heparin (porcine) 1,000 unit/mL injection 6,020 Units  80 Units/kg IntraVENous PRN              Karen Owens MD

## 2021-11-03 NOTE — PROGRESS NOTES
Hospitalist Progress Note    NAME: Navarro Bojorquez   :  1950   MRN:  [id number]       Assessment / Plan:    Pericardial effusion POA mild to moderate  Pulmonary Embolism POA  -Presented with presyncopal symptom (CP, diaphoresis, about to pass out), an episode of urinary incontinence.    -Hypotensive in ED 87/59  -Trop -ve x 1  -EKG: NSR, no QT prolongation, T wave inversion in V1-V2  -Echo: EF 55-60%, moderate pericardial effusion, severely elevated CVP  -CTA chest: pulmonary embolism of left lower lobe pulmonary arteries. Mod-severe emphysema, stable 3 lung nodules, mod pericardial effusion  -Continue heparin gtt --> transition to PO eliquis if no procedure in AM         Pt not interested in surgery, will discuss repeat echo with cards  -Cardiology consulted. No indication for therapeutic pericardiocentesis as HD stable diagnostic     Pericardiocentesis given lung nodules and PE. Appreciate cardiology input  -Pt opting to not to do pericardiocentesis at this time    Pulmonary nodules POA  -CT chest shows 9 mm RUL nodule, 9 mm. Viral RUL nodule, 10 mm RLL nodule. Reportedly all unchanged in size. Per cardiology notes this is new since 2019  -Current everyday smoker  -S/p PET scanning in 2021: Mildly hypermetabolic nodule RUL and minimally hypermetabolic nodule perihilar right upper lobe. These findings are nonspecific and could represent any number of inflammatory or neoplastic etiologies. Small noduleRLL does not demonstrate increased tracer activity  - Outpatient follow up    AAA   -4.5 CM ascending aorta aneurysm  -Counseled about smoking cessation and need for follow up    Macrocytosis  Borderline low folate  -normal B12, borderline low folate 4.9      COPD, not in exacerbation  Chronic hypoxic respiratory failure, secondary to above, on home 2-4L NC   Active smoker  HTN  Nebs as needed    18.5 - 24.9 Normal weight / Body mass index is 24.51 kg/m².     Estimated discharge date: 11/5  Barriers:    Code Status: Full  Surrogate Decision Maker: wife  DVT Prophylaxis: SCD  GI Prophylaxis: not indicated  Baseline: Independent     Subjective:     Chief Complaint / Reason for Physician Visit  Discussed with RN events overnight. Reports feeling fine. Denies chest pain or shortness of breath  Remains on heparin gtt  Declining to consider pericardiocentesis    Review of Systems:  Symptom Y/N Comments  Symptom Y/N Comments   Fever/Chills n   Chest Pain n    Poor Appetite    Edema     Cough n   Abdominal Pain n    Sputum    Joint Pain     SOB/ROSARIO n   Pruritis/Rash     Nausea/vomit n   Tolerating PT/OT     Diarrhea    Tolerating Diet y    Constipation    Other       Could NOT obtain due to:      Objective:     VITALS:   Last 24hrs VS reviewed since prior progress note.  Most recent are:  Patient Vitals for the past 24 hrs:   Temp Pulse Resp BP SpO2   11/03/21 1542    107/69    11/03/21 1535 98.7 °F (37.1 °C) 94  107/69 96 %   11/03/21 1530  94   95 %   11/03/21 1529  94   96 %   11/03/21 1528  99   93 %   11/03/21 1527  99   99 %   11/03/21 1526  97   93 %   11/03/21 1525  (!) 108   94 %   11/03/21 1524  (!) 108   95 %   11/03/21 1523  100   97 %   11/03/21 1522  97   96 %   11/03/21 1521  (!) 101   96 %   11/03/21 1520  85   95 %   11/03/21 1519  95   93 %   11/03/21 1518  96   95 %   11/03/21 1517  91   92 %   11/03/21 1516  96   95 %   11/03/21 1515  92   95 %   11/03/21 1514  (!) 102   94 %   11/03/21 1513  (!) 106   91 %   11/03/21 1512  100   93 %   11/03/21 1511  96   94 %   11/03/21 1510  (!) 102   95 %   11/03/21 1509  100   91 %   11/03/21 1508  91   97 %   11/03/21 1507  100   93 %   11/03/21 1506  88   95 %   11/03/21 1505  (!) 105   94 %   11/03/21 1504  (!) 102   94 %   11/03/21 1503  96   94 %   11/03/21 1502  99   94 %   11/03/21 1501  96   93 %   11/03/21 1500  99   93 %   11/03/21 1459  (!) 104   (!) 88 %   11/03/21 1458  97   91 %   11/03/21 1457  (!) 109   91 %   11/03/21 1456  100   92 %   11/03/21 1455  (!) 106   90 %   11/03/21 1454  (!) 101   93 %   11/03/21 1453  (!) 106   92 %   11/03/21 1452  (!) 104   91 %   11/03/21 1451  (!) 101   95 %   11/03/21 1450  99   94 %   11/03/21 1449  (!) 101      11/03/21 1448  99   94 %   11/03/21 1447  95   90 %   11/03/21 1446  100   (!) 89 %   11/03/21 1445  92   (!) 86 %   11/03/21 1444  (!) 109   90 %   11/03/21 1443  100   91 %   11/03/21 1442  97   91 %   11/03/21 1441  91   (!) 87 %   11/03/21 1440  (!) 106   94 %   11/03/21 1439  98   93 %   11/03/21 1438  97   91 %   11/03/21 1437  (!) 101   93 %   11/03/21 1436  (!) 104   92 %   11/03/21 1435  93  94/68 92 %   11/03/21 1434  (!) 109   (!) 86 %   11/03/21 1433  (!) 106   92 %   11/03/21 1432  (!) 103   91 %   11/03/21 1431  98   94 %   11/03/21 1430  97   (!) 89 %   11/03/21 1429  (!) 102   95 %   11/03/21 1428  (!) 104      11/03/21 1427  99   94 %   11/03/21 1426  96   92 %   11/03/21 1425  93   92 %   11/03/21 1424  (!) 103   94 %   11/03/21 1423  95   93 %   11/03/21 1422  (!) 103   94 %   11/03/21 1421  (!) 101   92 %   11/03/21 1420  99   92 %   11/03/21 1419  99   91 %   11/03/21 1418  (!) 108   91 %   11/03/21 1417  93   91 %   11/03/21 1416  (!) 101   91 %   11/03/21 1415  97   (!) 86 %   11/03/21 1414  99   (!) 89 %   11/03/21 1413  (!) 107   (!) 88 %   11/03/21 1412  (!) 104      11/03/21 1411  100   91 %   11/03/21 1410  100   93 %   11/03/21 1409  100   (!) 88 %   11/03/21 1408  (!) 101   (!) 89 %   11/03/21 1407  (!) 101   93 %   11/03/21 1406  (!) 112   93 %   11/03/21 1405  (!) 102      11/03/21 1404  (!) 107   92 %   11/03/21 1403  98   92 %   11/03/21 1402  (!) 106   93 %   11/03/21 1401     90 % 11/03/21 1400  (!) 106 18  92 %   11/03/21 1359  (!) 108 29  92 %   11/03/21 1358  99 24  94 %   11/03/21 1357  (!) 105 22  94 %   11/03/21 1356  (!) 102 19  93 %   11/03/21 1355  (!) 109 17  94 %   11/03/21 1354  95 20  91 %   11/03/21 1353  (!) 104 (!) 32  91 %   11/03/21 1352  95 23  91 %   11/03/21 1351  99 19  92 %   11/03/21 1350  95 21  96 %   11/03/21 1349  98 14  96 %   11/03/21 1348  97 (!) 31  94 %   11/03/21 1347  100 21  95 %   11/03/21 1346  (!) 101 24  95 %   11/03/21 1345  (!) 107 22  94 %   11/03/21 1344  (!) 101 28  93 %   11/03/21 1343  (!) 105 22  93 %   11/03/21 1342  (!) 101 23     11/03/21 1341  97 22  92 %   11/03/21 1340  (!) 102 27  (!) 87 %   11/03/21 1339  95 21  96 %   11/03/21 1338  97 (!) 32  94 %   11/03/21 1337  (!) 108 28  94 %   11/03/21 1336  93 22  93 %   11/03/21 1335  100 14  93 %   11/03/21 1334  (!) 110 22  96 %   11/03/21 1333     96 %   11/03/21 1332  (!) 110 17  97 %   11/03/21 1331  (!) 107 29  93 %   11/03/21 1330  95 22  91 %   11/03/21 1324  95 23  94 %   11/03/21 1323  95 (!) 31  95 %   11/03/21 1322  (!) 101 25  95 %   11/03/21 1321  (!) 107 25  95 %   11/03/21 1320  (!) 101   94 %   11/03/21 1319  (!) 103   93 %   11/03/21 1318  (!) 104   93 %   11/03/21 1317  (!) 101 (!) 42  94 %   11/03/21 1316  (!) 104 23  93 %   11/03/21 1315  99 22  94 %   11/03/21 1314  (!) 102 25  93 %   11/03/21 1313  97 22  94 %   11/03/21 1312  (!) 104 25  94 %   11/03/21 1311  (!) 105 17  92 %   11/03/21 1310  (!) 105 18  93 %   11/03/21 1309  (!) 101 28  94 %   11/03/21 1308  (!) 106 29  92 %   11/03/21 1307     94 %   11/03/21 1306  (!) 107 25  91 %   11/03/21 1305  (!) 104 30  90 %   11/03/21 1304  (!) 107 22  (!) 86 %   11/03/21 1303  93 18  (!) 88 %   11/03/21 1302  98 22  (!) 87 %   11/03/21 1301  (!) 102 26  91 %   11/03/21 1300  (!) 101 19  93 %   11/03/21 1259  97 24  90 %   11/03/21 1258  (!) 107 26  94 %   11/03/21 1257  (!) 107 29  96 %   11/03/21 1256  (!) 106 (!) 34  93 %   11/03/21 1255  (!) 103 26  94 %   11/03/21 1254  (!) 103 26  94 %   11/03/21 1253  (!) 107 25  96 %   11/03/21 1252  (!) 104 23  92 %   11/03/21 1251  (!) 110 18  91 %   11/03/21 1250  (!) 108 22  (!) 87 %   11/03/21 1249  100 30  92 %   11/03/21 1248  (!) 104 25  94 %   11/03/21 1247  (!) 108 26  92 %   11/03/21 1246  (!) 102 19  95 %   11/03/21 1245  99 17  94 %   11/03/21 1106 98.8 °F (37.1 °C) 94 25 118/73 92 %   11/03/21 0903     93 %   11/03/21 0737  86 20  95 %   11/03/21 0736  77 21  93 %   11/03/21 0735  68 22  92 %   11/03/21 0734  70 22  93 %   11/03/21 0733  84 25  93 %   11/03/21 0732  69 21  92 %   11/03/21 0731  72 22  93 %   11/03/21 0730  73 23  93 %   11/03/21 0729  72 23  93 %   11/03/21 0728  84 24  94 %   11/03/21 0727  74 20  93 %   11/03/21 0726  79 24  93 %   11/03/21 0725  71 21  93 %   11/03/21 0724  77 22  93 %   11/03/21 0723  74 21  93 %   11/03/21 0722  72 22  93 %   11/03/21 0721  77 26  93 %   11/03/21 0720  72 28  92 %   11/03/21 0719  71 22  92 %   11/03/21 0718  72 22  92 %   11/03/21 0717  75 22  92 %   11/03/21 0716 98.3 °F (36.8 °C) 78 18 108/69 93 %   11/03/21 0715  83 27  92 %   11/03/21 0714  85 21  91 %   11/03/21 0713  81 28  94 %   11/03/21 0712  83 20  92 %   11/03/21 0711  69 21  92 %   11/03/21 0710  82 24  92 %   11/03/21 0709  74 22  93 %   11/03/21 0708  76 22  92 %   11/03/21 0707  71 21  93 %   11/03/21 0706  86 23  92 %   11/03/21 0705  77 22  92 %   11/03/21 0704  70 17  92 %   11/03/21 0703  71 22  91 %   11/03/21 0702  71 20  92 %   11/03/21 0701  69 22  92 %   11/03/21 0700  74 21  92 %   11/03/21 0659  78 22  91 %   11/03/21 0658  74 23  92 %   11/03/21 0657  79 21  91 %   11/03/21 0656  71 22  93 %   11/03/21 0655  77 23  92 %   11/03/21 0654  71 23  92 %   11/03/21 0653  71 22  93 %   11/03/21 0652  67 20  93 %   11/03/21 0651  72 20  92 %   11/03/21 0650  79 23  91 %   11/03/21 0649  71 25  93 %   11/03/21 0648  76 23  93 %   11/03/21 0647  77 22  93 %   11/03/21 0646  71 22  93 %   11/03/21 0645  75 21  93 %   11/03/21 0644  69 21  92 %   11/03/21 0643  72 22  93 %   11/03/21 0642  74 24  93 %   11/03/21 0641  79 28  92 %   11/03/21 0640  83 22  92 %   11/03/21 0639  76 22  93 %   11/03/21 0638  70 20  93 %   11/03/21 0637  81 23  93 %   11/03/21 0636  68 22  93 %   11/03/21 0635  72 23  93 %   11/03/21 0634  70 20  93 %   11/03/21 0633  71 21  92 %   11/03/21 0632  74 23  91 %   11/03/21 0631  72 23  92 %   11/03/21 0630  75 24  91 %   11/03/21 0629  75 26  90 %   11/03/21 0628  73 24  91 %   11/03/21 0627  82 21  92 %   11/03/21 0626  86 19  91 %   11/03/21 0625  87 25  90 %   11/03/21 0624  84 24  90 %   11/03/21 0623  83 17  91 %   11/03/21 0622  90 17  (!) 89 %   11/03/21 0621  89 (!) 32     11/03/21 0620  86 27     11/03/21 0619  85 21  (!) 89 %   11/03/21 0618  94 22  92 %   11/03/21 0617  87 21  91 %   11/03/21 0616  73 21  92 %   11/03/21 0615  75 22  92 %   11/03/21 0614  74 20  92 %   11/03/21 0613  75 21  92 %   11/03/21 0612  79 23  92 %   11/03/21 0611  84 20  91 %   11/03/21 0610  73 23  92 %   11/03/21 0609  72 20  92 %   11/03/21 0608  74 20  92 %   11/03/21 0607  74 24  93 %   11/03/21 0606  77 24  92 %   11/03/21 0605  77 23  93 %   11/03/21 0604  73 19  93 %   11/03/21 0603  78 27  93 %   11/03/21 0602  78 20  94 %   11/03/21 0601  75 28  94 %   11/03/21 0600  74 20  93 %   11/03/21 0559  85 20  94 %   11/03/21 0558  73 24  94 %   11/03/21 0557  84 22  94 %   11/03/21 0556  81 22  94 %   11/03/21 0555  76 21  93 %   11/03/21 0554  76 20  93 %   11/03/21 0553  72 22  93 %   11/03/21 0552  78 27  94 %   11/03/21 0551  70 21  94 %   11/03/21 0550  74 20  93 %   11/03/21 0549  74 20  93 %   11/03/21 0548  72 20  94 %   11/03/21 0547  75 22  93 %   11/03/21 0546  74 21  93 %   11/03/21 0545  73 22  93 %   11/03/21 0544  77 21  93 %   11/03/21 0543  77 21  94 %   11/03/21 0542  75 20  93 %   11/03/21 0541  73 21  94 %   11/03/21 0540  75 19  94 %   11/03/21 0539  73 21  93 %   11/03/21 0538  73 21  94 %   11/03/21 0537  69 23  93 %   11/03/21 0536  72 22  93 %   11/03/21 0535  71 22  93 %   11/03/21 0534  74 22  93 %   11/03/21 0533  77 22  93 %   11/03/21 0532  77 23  93 %   11/03/21 0531  83 24  93 %   11/03/21 0530  81 20  93 %   11/03/21 0529  76 20  93 %   11/03/21 0528  73 22  93 %   11/03/21 0527  72 22  92 %   11/03/21 0526  74 21  93 %   11/03/21 0525  79 21  93 %   11/03/21 0524  73 23  93 %   11/03/21 0523  74 23  92 %   11/03/21 0522  73 21  95 %   11/03/21 0521  73 22  92 %   11/03/21 0520  77 24  92 %   11/03/21 0519  80 24  92 %   11/03/21 0518  92 21  95 %   11/03/21 0517  85 22  94 %   11/03/21 0516  83 26  91 %   11/03/21 0515  76 23  92 %   11/03/21 0514  77 23  92 %   11/03/21 0513  78 25  93 %   11/03/21 0512  93 (!) 32  93 %   11/03/21 0511  77 24  93 %   11/03/21 0510  80 26  94 %   11/03/21 0509  83 21  95 %   11/03/21 0508  77 24  95 %   11/03/21 0507  75 24  95 %   11/03/21 0506  77 23  95 %   11/03/21 0505  75 22  95 %   11/03/21 0504  90 23  96 %   11/03/21 0503  77 23  95 %   11/03/21 0502  77 28  95 %   11/03/21 0501  80 24  95 %   11/03/21 0500  75 20  95 %   11/03/21 0459  78 22  95 %   11/03/21 0458  75 23  95 %   11/03/21 0457  73 (!) 31  95 %   11/03/21 0456  75 24  95 %   11/03/21 0455  72 (!) 31  95 %   11/03/21 0454  76 24  95 %   11/03/21 0453  74 25  95 %   11/03/21 0452  78 25  94 %   11/03/21 0451  74 28  95 %   11/03/21 0450  72 21  95 % 11/03/21 0449  74 20  95 %   11/03/21 0448  73 21  95 %   11/03/21 0447  76 22  95 %   11/03/21 0446  70 26  95 %   11/03/21 0445  70 20  95 %   11/03/21 0444  72 20  95 %   11/03/21 0443  76 23  94 %   11/03/21 0442  71 21  95 %   11/03/21 0441  72 21  95 %   11/03/21 0440  71 21  95 %   11/03/21 0439  73 21  94 %   11/03/21 0438  71 23  95 %   11/03/21 0437  73 21  95 %   11/03/21 0436  70 22  95 %   11/03/21 0435  72 21  94 %   11/03/21 0434  72 21  95 %   11/03/21 0433  73 22  95 %   11/03/21 0432  75 22  94 %   11/03/21 0431  75 25  94 %   11/03/21 0430  73 21  94 %   11/03/21 0429  71 20  94 %   11/03/21 0428  72 21  94 %   11/03/21 0427  70 20  94 %   11/03/21 0426  77 22  94 %   11/03/21 0425  74 22  94 %   11/03/21 0424  76 23  93 %   11/03/21 0423  71 23  94 %   11/03/21 0422  74 20  93 %   11/03/21 0421  75 22  93 %   11/03/21 0420  74 22  92 %   11/03/21 0419  76 23  92 %   11/03/21 0418  73 25  93 %   11/03/21 0417  78 29  92 %   11/03/21 0416  78 23  92 %   11/03/21 0415  77 21  93 %   11/03/21 0414  85 (!) 31  92 %   11/03/21 0413  73 22  92 %   11/03/21 0412  82 20  91 %   11/03/21 0411  87 18  (!) 89 %   11/03/21 0410  85 15  (!) 89 %   11/03/21 0409  83 18  92 %   11/03/21 0408  90 21  92 %   11/03/21 0407  95 22  92 %   11/03/21 0406  76 23  94 %   11/03/21 0405  73 21  93 %   11/03/21 0404  73 23  94 %   11/03/21 0403  79 17  94 %   11/03/21 0402  69 22  94 %   11/03/21 0401  77 24  94 %   11/03/21 0400  82 24  94 %   11/03/21 0359  80 23  93 %   11/03/21 0358  77 23  94 %   11/03/21 0357  72 23  95 %   11/03/21 0356  71 23  94 %   11/03/21 0355  73 21  94 %   11/03/21 0354  74 21  93 %   11/03/21 0353  69 21  94 %   11/03/21 0352  69 22  94 %   11/03/21 0351  69 22  94 %   11/03/21 0350  70 21  93 %   11/03/21 0349  74 25  94 %   11/03/21 0348  84 22  94 %   11/03/21 0347  71 21  94 %   11/03/21 0346  70 20  93 %   11/03/21 0345  78 20  93 %   11/03/21 0344  80 22  94 %   11/03/21 0343  74 21  94 %   11/03/21 0342  70 23 99/61 93 %   11/03/21 0341  69 24  94 %   11/03/21 0340  66 24  93 %   11/03/21 0339  74 24  93 %   11/03/21 0338  71 22  93 %   11/03/21 0337  66 24  92 %   11/03/21 0336  73 24  92 %   11/03/21 0335  73 20  92 %   11/03/21 0334  80 25  92 %   11/03/21 0333  76 22  93 %   11/03/21 0332  81 25  93 %   11/03/21 0331  68 24  92 %   11/03/21 0330  80 24  93 %   11/03/21 0329  71 21  92 %   11/03/21 0328  75 24  93 %   11/03/21 0327  72 25  92 %   11/03/21 0326  84 20  92 %   11/03/21 0325  94 22     11/03/21 0324  81 26     11/03/21 0323  86 20  92 %   11/03/21 0322  83 19  91 %   11/03/21 0321  81 26  91 %   11/03/21 0320  80 27  91 %   11/03/21 0319  85 20  92 %   11/03/21 0318 98.4 °F (36.9 °C) 87 18 104/67 96 %   11/03/21 0000  88      11/02/21 2325 98.2 °F (36.8 °C) 75 20 111/70 94 %   11/02/21 2000  82      11/02/21 1935 98.4 °F (36.9 °C) 81 17 103/68 92 %       Intake/Output Summary (Last 24 hours) at 11/3/2021 1825  Last data filed at 11/3/2021 1751  Gross per 24 hour   Intake 240 ml   Output 650 ml   Net -410 ml        I had a face to face encounter and independently examined this patient on 11/3/2021, as outlined below:  PHYSICAL EXAM:  General: WD, WN. Alert, cooperative, no acute distress    EENT:  EOMI. Anicteric sclerae. MMM  Resp:  CTA bilaterally, no wheezing or rales. No accessory muscle use  CV:  Regular  rhythm,  No edema  GI:  Soft, Non distended, Non tender. +Bowel sounds  Neurologic:  Alert and oriented X 3, normal speech,   Psych:   Good insight. Not anxious nor agitated  Skin:  No rashes.   No jaundice    Reviewed most current lab test results and cultures  YES  Reviewed most current radiology test results   YES  Review and summation of old records today    NO  Reviewed patient's current orders and MAR    YES  PMH/SH reviewed - no change compared to H&P  ________________________________________________________________________  Care Plan discussed with:    Comments   Patient x    Family      RN x    Care Manager     Consultant                        Multidiciplinary team rounds were held today with , nursing, pharmacist and clinical coordinator. Patient's plan of care was discussed; medications were reviewed and discharge planning was addressed. ________________________________________________________________________      Comments   >50% of visit spent in counseling and coordination of care x    ________________________________________________________________________  Catalino Kimbrough MD     Procedures: see electronic medical records for all procedures/Xrays and details which were not copied into this note but were reviewed prior to creation of Plan. LABS:  I reviewed today's most current labs and imaging studies.   Pertinent labs include:  Recent Labs     11/03/21  0042 11/02/21 0249 11/01/21  1714   WBC 9.0 9.3 8.4   HGB 11.7* 13.1 14.2   HCT 36.1* 41.5 43.7   * 149* 151     Recent Labs     11/02/21 0249 11/01/21  0836    142   K 4.4 3.6    106   CO2 28 33*   * 174*   BUN 25* 17   CREA 1.23 1.16   CA 9.1 9.0   MG 2.0  --    ALB 3.0* 3.4*   TBILI 0.8 0.8   ALT 38 36       Signed: Catalino Kimbrough MD

## 2021-11-03 NOTE — PROGRESS NOTES
Problem: Chronic Obstructive Pulmonary Disease (COPD) Goal: *Oxygen saturation during activity within specified parameters Outcome: Progressing Towards Goal

## 2021-11-03 NOTE — PROGRESS NOTES
Problem: Pressure Injury - Risk of 
Goal: *Prevention of pressure injury Description: Document Ender Scale and appropriate interventions in the flowsheet. Outcome: Progressing Towards Goal 
Note: Pressure Injury Interventions: Activity Interventions: Increase time out of bed Mobility Interventions: Assess need for specialty bed Nutrition Interventions: Document food/fluid/supplement intake Problem: Patient Education: Go to Patient Education Activity Goal: Patient/Family Education Outcome: Progressing Towards Goal 
  
Problem: Falls - Risk of 
Goal: *Absence of Falls Description: Document Danne Lobe Fall Risk and appropriate interventions in the flowsheet. Outcome: Progressing Towards Goal 
Note: Fall Risk Interventions: 
  
 
  
 
Medication Interventions: Bed/chair exit alarm, Assess postural VS orthostatic hypotension, Evaluate medications/consider consulting pharmacy, Patient to call before getting OOB Elimination Interventions: Call light in reach, Bed/chair exit alarm, Patient to call for help with toileting needs History of Falls Interventions: Consult care management for discharge planning, Bed/chair exit alarm, Evaluate medications/consider consulting pharmacy

## 2021-11-03 NOTE — PROGRESS NOTES
Bedside shift change report given to Arjun Villalba RN (oncoming nurse) by Linh Alva (offgoing nurse). Report included the following information SBAR, Kardex, Intake/Output, MAR and Cardiac Rhythm NSR.  
 
1325: Noticed patient's heart rate became irregular. Monitor reading afib but unclear, changed leads, still unclear placed EKG order per protocol for change of rhythm. EKG reads afiib, informed Dr. Cele Chamorro Had  call Dr. Taylor Delacruz to inform of rhythm change Dr. Taylor Delacruz called back gave verbal orders for 500 mL fluid bolus and to start amio dtt. Orders placed End of Shift Note Bedside shift change report given to ESTEFANIA Oro (oncoming nurse) by Rickie Black RN (offgoing nurse). Report included the following information SBAR, Kardex, Intake/Output, MAR and Cardiac Rhythm afib Shift worked:  7a-7p Shift summary and any significant changes:  
  now in afib and on amio. Heparin dtt off. Concerns for physician to address:  plan? Zone phone for oncoming shift:    
  
 
Activity: 
Activity Level: Up with Assistance Number times ambulated in hallways past shift: 0 Number of times OOB to chair past shift: 4 Cardiac:  
Cardiac Monitoring: Yes     
Cardiac Rhythm: Atrial Fib Access:  
Current line(s): PIV Genitourinary:  
Urinary status: voiding Respiratory:  
O2 Device: Nasal cannula Chronic home O2 use?: YES Incentive spirometer at bedside: YES 
  
GI: 
Last Bowel Movement Date: 11/02/21 Current diet:  ADULT DIET Regular; Low Fat/Low Chol/High Fiber/2 gm Na Passing flatus: YES Tolerating current diet: YES Pain Management:  
Patient states pain is manageable on current regimen: YES Skin: 
Ender Score: 21 Interventions: limit briefs Patient Safety: 
Fall Score: Total Score: 4 Interventions: bed/chair alarm and gripper socks High Fall Risk: Yes Length of Stay: 
Expected LOS: 2d 14h Actual LOS: 2 Rickie Black, ESTEFANIA

## 2021-11-03 NOTE — ROUTINE PROCESS
The documentation for this period is being entered following the guidelines as defined in the [facility] downtime policy by Jorge Chen. 0156: PTT 90.4 and Stopped heparin for an hour; verified by pharmacist. To resume at less 3 of previous rate. 0300: Restarted heparin at 15 units/kg/hr

## 2021-11-04 NOTE — PROGRESS NOTES
Problem: Pressure Injury - Risk of 
Goal: *Prevention of pressure injury Description: Document Ender Scale and appropriate interventions in the flowsheet. 11/4/2021 0957 by Mily Encinas RN Outcome: Progressing Towards Goal 
Note: Pressure Injury Interventions: Activity Interventions: Increase time out of bed Mobility Interventions: Turn and reposition approx. every two hours(pillow and wedges) Nutrition Interventions: Document food/fluid/supplement intake Friction and Shear Interventions: HOB 30 degrees or less 11/4/2021 0950 by Mily Encinas RN Outcome: Progressing Towards Goal 
Note: Pressure Injury Interventions: Activity Interventions: Assess need for specialty bed, Pressure redistribution bed/mattress(bed type) Mobility Interventions: Pressure redistribution bed/mattress (bed type) Nutrition Interventions: Document food/fluid/supplement intake Friction and Shear Interventions: Apply protective barrier, creams and emollients, Minimize layers Problem: Patient Education: Go to Patient Education Activity Goal: Patient/Family Education 11/4/2021 0957 by Mily Encinas RN Outcome: Progressing Towards Goal 
11/4/2021 0950 by Mily Encinas RN Outcome: Progressing Towards Goal 
  
Problem: Falls - Risk of 
Goal: *Absence of Falls Description: Document Clydene Bone Fall Risk and appropriate interventions in the flowsheet. 11/4/2021 0957 by Mily Encinas RN Outcome: Progressing Towards Goal 
Note: Fall Risk Interventions: 
  
 
Mentation Interventions: Adequate sleep, hydration, pain control Medication Interventions: Evaluate medications/consider consulting pharmacy, Patient to call before getting OOB Elimination Interventions: Bed/chair exit alarm, Call light in reach History of Falls Interventions: Bed/chair exit alarm, Consult care management for discharge planning 11/4/2021 0950 by Mily Encinas RN 
Outcome: Progressing Towards Goal 
Note: Fall Risk Interventions: 
  
 
Mentation Interventions: Bed/chair exit alarm, Adequate sleep, hydration, pain control Medication Interventions: Bed/chair exit alarm, Patient to call before getting OOB, Teach patient to arise slowly Elimination Interventions: Bed/chair exit alarm, Call light in reach, Urinal in reach History of Falls Interventions: Bed/chair exit alarm Problem: Patient Education: Go to Patient Education Activity Goal: Patient/Family Education 11/4/2021 0957 by Leopoldo Necessary, RN Outcome: Progressing Towards Goal 
11/4/2021 0950 by Leopoldo Necessary, RN Outcome: Progressing Towards Goal 
  
Problem: Chronic Obstructive Pulmonary Disease (COPD) Goal: *Oxygen saturation during activity within specified parameters 11/4/2021 0957 by Leopoldo Necessary, RN Outcome: Progressing Towards Goal 
11/4/2021 0950 by Leopoldo Necessary, RN Outcome: Progressing Towards Goal 
  
Problem: Patient Education: Go to Patient Education Activity Goal: Patient/Family Education 11/4/2021 0957 by Leopoldo Necessary, RN Outcome: Progressing Towards Goal 
11/4/2021 0950 by Leopoldo Necessary, RN Outcome: Progressing Towards Goal

## 2021-11-04 NOTE — PROGRESS NOTES
End of Shift Note Bedside shift change report given to Master Villavicencio RN (oncoming nurse) by Lexus Ley RN (offgoing nurse). Report included the following information SBAR, Kardex, Recent Results and Cardiac Rhythm Afib Shift worked:  [address] Shift summary and any significant changes:  
 Pt converted to SR, hr 70 this am. Amio infusing at 0.5 mg/min Concerns for physician to address:   
  
Zone phone for oncoming shift:    
  
 
Activity: 
Activity Level: Up with Assistance Number times ambulated in hallways past shift: 0 Number of times OOB to chair past shift: 0 Cardiac:  
Cardiac Monitoring: Yes     
Cardiac Rhythm: Atrial Fib Access:  
Current line(s): PIV Genitourinary:  
Urinary status: voiding Respiratory:  
O2 Device: Nasal cannula Chronic home O2 use?: YES Incentive spirometer at bedside: YES 
  
GI: 
Last Bowel Movement Date: 11/02/21 Current diet:  ADULT DIET Regular; Low Fat/Low Chol/High Fiber/2 gm Na Passing flatus: YES Tolerating current diet: YES Pain Management:  
Patient states pain is manageable on current regimen: YES Skin: 
Ender Score: 20 Interventions: limit briefs Patient Safety: 
Fall Score: Total Score: 3 Interventions: bed/chair alarm and pt to call before getting OOB High Fall Risk: Yes Length of Stay: 
Expected LOS: 2d 14h Actual LOS: 3 Lexus Ley RN

## 2021-11-04 NOTE — PROGRESS NOTES
Hospitalist Progress Note NAME: Shanelle Da Silva :  1950 MRN:  [id number] Assessment / Plan: 
 
Pericardial effusion POA mild to moderate Pulmonary Embolism POA 
- Presented with presyncopal symptom (CP, diaphoresis, about to pass out), an episode of urinary incontinence. - Hypotensive in ED 87/59 
- Trop -ve x 1 
- EKG: NSR, no QT prolongation, T wave inversion in V1-V2 
- Echo: EF 55-60%, moderate pericardial effusion, severely elevated CVP 
- CTA chest: pulmonary embolism of left lower lobe pulmonary arteries. Mod-severe emphysema, stable 3 lung nodules, mod pericardial effusion 
- Continue heparin gtt --> transitioned to lovenox last PM by cards - Transition to eliquis,  to check pricing - Cardiology consulted. No indication for therapeutic pericardiocentesis as HD stable diagnostic Pericardiocentesis given lung nodules and PE. Appreciate cardiology input 
-Pt opting to not to do pericardiocentesis at this time - Spoke with wife at bedside - Discharge in AM 
 
Pulmonary nodules POA 
-CT chest shows 9 mm RUL nodule, 9 mm. Viral RUL nodule, 10 mm RLL nodule. Reportedly all unchanged in size. Per cardiology notes this is new since 2019 
-Current everyday smoker 
-S/p PET scanning in 2021: Mildly hypermetabolic nodule RUL and minimally hypermetabolic nodule perihilar right upper lobe. These findings are nonspecific and could represent any number of inflammatory or neoplastic etiologies. Small noduleRLL does not demonstrate increased tracer activity 
- Outpatient follow up [facility]  
-4.5 CM ascending aorta aneurysm 
-Counseled about smoking cessation and need for follow up Macrocytosis Borderline low folate 
-normal B12, borderline low folate 4.9 
 
COPD, not in exacerbation Chronic hypoxic respiratory failure, secondary to above, on home 2-4L NC Active smoker HTN Nebs as needed 18.5 - 24.9 Normal weight / Body mass index is 24.51 kg/m².  
 
Estimated discharge date: 11/5 Barriers: 
 
Code Status: Full Surrogate Decision Maker: wife DVT Prophylaxis: SCD 
GI Prophylaxis: not indicated Baseline: Independent Subjective: Chief Complaint / Reason for Physician Visit Discussed with RN events overnight. \" I want to go home\" Denies chest pain or shortness of breath Off heparin gtt to lovenox Spoke with wife at bedside Review of Systems: 
Symptom Y/N Comments  Symptom Y/N Comments Fever/Chills n   Chest Pain n   
Poor Appetite    Edema Cough n   Abdominal Pain n   
Sputum    Joint Pain SOB/ROSARIO n   Pruritis/Rash Nausea/vomit n   Tolerating PT/OT Diarrhea    Tolerating Diet y Constipation    Other Could NOT obtain due to:   
 
Objective: VITALS:  
Last 24hrs VS reviewed since prior progress note. Most recent are: 
Patient Vitals for the past 24 hrs: 
 Temp Pulse Resp BP SpO2  
11/04/21 1546 97.4 °F (36.3 °C) 79 18 128/86 95 % 11/04/21 1426     95 % 11/04/21 1200  76 18 127/80 97 % 11/04/21 1000  71 16 135/73 94 % 11/04/21 0900  77 14  91 % 11/04/21 0824     96 % 11/04/21 0800 97.9 °F (36.6 °C) 74 18 124/78 98 % 11/04/21 0715 97.9 °F (36.6 °C) 79 16 114/79 96 % 11/04/21 0400 98.3 °F (36.8 °C) 97 20 102/61 98 % 11/04/21 0242     93 % 11/04/21 0000  90     
11/03/21 2300 98.2 °F (36.8 °C) (!) 105  111/74 95 % 11/03/21 2055     97 % 11/03/21 2050     97 % 11/03/21 2030 98.9 °F (37.2 °C) 85 25 116/71 97 % 11/03/21 2000  (!) 105    Intake/Output Summary (Last 24 hours) at 11/4/2021 1751 Last data filed at 11/4/2021 9502 Gross per 24 hour Intake  Output 580 ml Net -580 ml I had a face to face encounter and independently examined this patient on 11/4/2021, as outlined below: PHYSICAL EXAM: 
General: WD, WN. Alert, cooperative, no acute distress EENT:  Anicteric sclerae. MMM Resp:  CTA bilaterally, no wheezing or rales.   No accessory muscle use 
CV:  Regular  rhythm,  No edema GI:  Soft, Non distended, Non tender. +Bowel sounds Neurologic:  Alert and oriented X 3, normal speech, Psych:   Good insight. Not anxious nor agitated Skin:  No rashes. No jaundice Reviewed most current lab test results and cultures  YES Reviewed most current radiology test results   YES Review and summation of old records today    NO Reviewed patient's current orders and MAR    YES 
PMH/SH reviewed - no change compared to H&P 
________________________________________________________________________ Care Plan discussed with: 
  Comments Patient x Family  x Wife  
RN x Care Manager Consultant Multidiciplinary team rounds were held today with , nursing, pharmacist and clinical coordinator. Patient's plan of care was discussed; medications were reviewed and discharge planning was addressed. ________________________________________________________________________ Comments >50% of visit spent in counseling and coordination of care x   
________________________________________________________________________ Damir Carlos MD  
 
Procedures: see electronic medical records for all procedures/Xrays and details which were not copied into this note but were reviewed prior to creation of Plan. LABS: 
I reviewed today's most current labs and imaging studies. Pertinent labs include: 
Recent Labs 11/04/21 
1623 11/03/21 
0042 11/02/21 
0249 WBC 6.6 9.0 9.3 HGB 11.7* 11.7* 13.1 HCT 35.7* 36.1* 41.5 * 124* 149* Recent Labs 11/04/21 
3291 11/02/21 
0249  141  
K 4.0 4.4  108 CO2 31 28 GLU 94 136* BUN 25* 25* CREA 0.94 1.23  
CA 8.8 9.1 MG  --  2.0 ALB 3.0* 3.0* TBILI 0.7 0.8 ALT 31 38 Signed: Damir Carlos MD

## 2021-11-04 NOTE — PROGRESS NOTES
Progress Note 
 
 
11/4/2021 10:56 AM 
NAME: Tex Cho MRN:  [id number] Admit Diagnosis: Cardiac tamponade [I31.4] Primary Cardiologist: Dr Shandra Barajas Physician Requesting consult: Dr Jese Roth  
  
Assessment: 
  
  
Pulmonary embolism Pericardial effusion - moderate effusion without hemodynamic compromise on echo Pulmonary nodule - ??suspected malignant as they were not present on scan in 2019 (seen by Dr Taylor Kern at [facility] and discussed in tumor board- empiric treatment, biopsy and observation was discussed) - Lung nodule stable on CT PE this admission Afib with RVR in the setting of PE  
COPD/ Emphysema - Follows with Dr Neha East Diastolic heart failure HTN Pulmonary HTN, PA pressure 56 on previous echo Dilated ascending aorta 4.5 cm Smoking    
  
   
  
Recommendations: 
  
Patient presented with pulmonary embolism and also has moderate pericardial effusion without hemodynamic compromise (BP and HR stable, no RV collapse on echo0 Discussed with patient about indications for pericardiocentesis - diagnostic and therapeutic. He has lung nodules and ? Not confirmed but suspected lung malignancy. With PE and pericardial effusion there would be concern for malignancy. I have discussed with patient about Pericardiocentesis for diagnostic purpose. Discussed that if pericardial effusions gets worse then it can be life threatening. Patient refused diagnostic pericardiocentesis. Repeat echo showed stable or little better effusion. He does not want to stay in hospital any longer. Had afib with RVR in the setting of PE. Now back in NSR on Amiodarone. Feels well and wants to to home AC for PE Lovenox can be changed to NOAC on discharge Has Afib in the setting of PE, needing amiodarone gtt, change Amiodarone to 200 mg po BID x1 wk then 200 mg daily for 1-2 months, will use it for short term due to COPD Will plan for OP follow up in 2 wks with repeat echo Discussed to return to ER with any symptoms or low BP or elevated HR  
 
  
Thank you for this consult and allowing me to take part in this patients care. Please call with questions. 
  
   
  
 [x]? High complexity decision making was performed 
  
 
 
Subjective: HPI: 
 no CP or SOB  
 
ROS: No CP, SOB, Abd pain, nausea, vomiting, syncope, palpitations, new focal neurological symptoms Objective:  
  
Physical Exam: 
 
Last 24hrs VS reviewed since prior progress note. Most recent are: 
 
Visit Vitals /79 Pulse 74 Temp 97.9 °F (36.6 °C) Resp 16 Ht 5' 9\" (1.753 m) Wt 75.3 kg (166 lb 0.1 oz) SpO2 96% BMI 24.51 kg/m² Intake/Output Summary (Last 24 hours) at 11/4/2021 8533 Last data filed at 11/4/2021 0173 Gross per 24 hour Intake  Output 930 ml Net -930 ml General: Alert and oriented x3, no acute distress Neck: Supple Respiratory: No respiratory distress, clear lung sound Cardiovascular: Regular rate rhythm, S1S2, no murmur Abdomen: soft, non tender, non distended Neuro: moves all extremities, oriented x3 Skin: warm and dry Extremity: no edema, warm to touch Data Review Telemetry: normal sinus rhythm Lab Data Personally Reviewed: 
 
Recent Labs 11/04/21 
8219 11/03/21 
5911 WBC 6.6 9.0 HGB 11.7* 11.7* HCT 35.7* 36.1*  
* 124* Recent Labs 11/03/21 
0908 11/03/21 
0042 11/02/21 
1652 APTT 49.0* 90.4* 47.4* Recent Labs 11/04/21 
2527 11/02/21 
0249  141  
K 4.0 4.4  108 CO2 31 28 BUN 25* 25* CREA 0.94 1.23  
GLU 94 136* CA 8.8 9.1 MG  --  2.0 No results for input(s): CPK, CKNDX, TROIQ in the last 72 hours. No lab exists for component: CPKMB Lab Results Component Value Date/Time  Cholesterol, total 205 (H) 05/10/2021 09:53 AM  
 HDL Cholesterol 72 05/10/2021 09:53 AM  
 LDL, calculated 114.4 (H) 05/10/2021 09:53 AM  
 Triglyceride 93 05/10/2021 09:53 AM  
 CHOL/HDL Ratio 2.8 05/10/2021 09:53 AM  
 
 
Recent Labs 11/04/21 
5910 11/02/21 
0249 AP 85 91  
TP 5.8* 5.7* ALB 3.0* 3.0*  
GLOB 2.8 2.7 No results for input(s): PH, PCO2, PO2 in the last 72 hours. Medications Personally Reviewed: 
 
Current Facility-Administered Medications Medication Dose Route Frequency  amiodarone (CORDARONE) 375 mg/250 mL D5W infusion  0.5 mg/min IntraVENous CONTINUOUS  
 enoxaparin (LOVENOX) injection 80 mg  1 mg/kg SubCUTAneous Q12H  ipratropium (ATROVENT) 0.02 % nebulizer solution 0.5 mg  0.5 mg Nebulization Q6H RT  
 budesonide (PULMICORT) 250 mcg/2ml nebulizer susp  250 mcg Nebulization BID RT  
 arformoteroL (BROVANA) neb solution 15 mcg  15 mcg Nebulization BID RT  
 sodium chloride (NS) flush 5-40 mL  5-40 mL IntraVENous Q8H  
 sodium chloride (NS) flush 5-40 mL  5-40 mL IntraVENous PRN  
 acetaminophen (TYLENOL) tablet 650 mg  650 mg Oral Q6H PRN Or  
 acetaminophen (TYLENOL) suppository 650 mg  650 mg Rectal Q6H PRN  polyethylene glycol (MIRALAX) packet 17 g  17 g Oral DAILY PRN  
 ondansetron (ZOFRAN ODT) tablet 4 mg  4 mg Oral Q8H PRN Or  
 ondansetron (ZOFRAN) injection 4 mg  4 mg IntraVENous Q6H PRN Olinda Perez MD

## 2021-11-04 NOTE — PROGRESS NOTES
Transition of Care Plan: 
  
RUR:  10% Disposition: Home with spouse Follow up appointments:  PCP, Specialists DME needed: Pt has home o2 through [email or web address]. Transportation at Discharge:  Pt's spouse will transport at d/c. [place] or means to access home:  yes IM Medicare Letter: needed at d/c Is patient a BCPI-A Bundle:  n/a If yes, was Bundle Letter given?:    
Caregiver Contact:  Tahir Kwon  [phone] Discharge Caregiver contacted prior to discharge? CM will contact at d/c if pt desires. CM attended IDR. ASIA is 11/5/21. Pt is being followed by cardiology. CM will continue to follow for d/c needs. Bonita Mckeon, MSW Care Management, [address]

## 2021-11-04 NOTE — PROGRESS NOTES
Problem: Pressure Injury - Risk of 
Goal: *Prevention of pressure injury Description: Document Ender Scale and appropriate interventions in the flowsheet. Outcome: Progressing Towards Goal 
Note: Pressure Injury Interventions: Activity Interventions: Assess need for specialty bed, Pressure redistribution bed/mattress(bed type) Mobility Interventions: Pressure redistribution bed/mattress (bed type) Nutrition Interventions: Document food/fluid/supplement intake Friction and Shear Interventions: Apply protective barrier, creams and emollients, Minimize layers Problem: Patient Education: Go to Patient Education Activity Goal: Patient/Family Education Outcome: Progressing Towards Goal 
  
Problem: Falls - Risk of 
Goal: *Absence of Falls Description: Document Eve Laird Fall Risk and appropriate interventions in the flowsheet. Outcome: Progressing Towards Goal 
Note: Fall Risk Interventions: 
  
 
Mentation Interventions: Bed/chair exit alarm, Adequate sleep, hydration, pain control Medication Interventions: Bed/chair exit alarm, Patient to call before getting OOB, Teach patient to arise slowly Elimination Interventions: Bed/chair exit alarm, Call light in reach, Urinal in reach History of Falls Interventions: Bed/chair exit alarm Problem: Patient Education: Go to Patient Education Activity Goal: Patient/Family Education Outcome: Progressing Towards Goal 
  
Problem: Chronic Obstructive Pulmonary Disease (COPD) Goal: *Oxygen saturation during activity within specified parameters Outcome: Progressing Towards Goal 
  
Problem: Patient Education: Go to Patient Education Activity Goal: Patient/Family Education Outcome: Progressing Towards Goal

## 2021-11-05 NOTE — PROGRESS NOTES
2237: pt converted to AF, hr 120's. Denies cp or palpitation. 2250: on call cardiologist notified. amio x1 bolus ordered. 0019: pt convert back to NSR w/ Pac's, hr 70 - 80's. 0154: pt in AF again, hr 100's. Per cardiology will increase po amio in am.  
 
Bedside shift change report given to Miguel Velez RN (oncoming nurse) by Lesley Hickey RN (offgoing nurse).   Report included the following information SBAR, Kardex, Med Rec Status and Cardiac Rhythm AF

## 2021-11-05 NOTE — DISCHARGE INSTRUCTIONS
Patient Discharge Instructions    Doreen Molina / [id number] : 1950    Admitted 2021 Discharged: 2021         DISCHARGE DIAGNOSIS:     Left lung pulmonary emboli(blood clot in the lung)    Small to moderate pericardial effusion( fluid in sac around the heart)      What to do at Home    1. Recommended diet: Cardiac Diet    2. Recommended activity: Activity as tolerated    3. If you experience any of the following symptoms then please call your primary care physician or return to the emergency room if you cannot get hold of your doctor:   Fevers > 100.5, chills   Nausea or vomiting, persistent diarrhea > 24 hours   Blood in stool or black stools   Chest pain or SOB      Follow-up Information     Follow up With Specialties Details Why Contact Info    Marialuisa Hernandez DO Internal Medicine Schedule an appointment as soon as possible for a visit in 2 weeks  [address] [facility]  [address] [facility]      Claudia Cazares MD Cardiology, Internal Medicine Schedule an appointment as soon as possible for a visit  [address]  [address]  [address] [address] [phone]          Take eliquis for at least 6 months, d/w Dr Howe Him about when to stop after this         Risk of recurrent pulmonary emboli is high if not taking the blood thinner    Eliquis dose is 10 mg( Two 5 mg tabs) twice per day for first 7 days, then 5 mg(one tab) twice per day after the first week    Eliquis may cause bleeding, come to ED immediately or call 911 if you have any usually bleeding or severe head trauma            Bloody stools or black, loose stools(partially digested blood), vomit blood, Bloody urine, uncontrolled nose bleeds            If you have significant head trauma      Information obtained by :  I understand that if any problems occur once I am at home I am to contact my physician.     I understand and acknowledge receipt of the instructions indicated above.                                                                                                                                           Physician's or R.N.'s Signature                                                                  Date/Time                                                                                                                                              Patient or Representative Signature                                                          Date/Time    MyChart Activation    Thank you for requesting access to [facility]. Please follow the instructions below to securely access and download your online medical record. [employer] [facility] allows you to send messages to your doctor, view your test results, renew your prescriptions, schedule appointments, and more. How Do I Sign Up? 1. In your internet browser, go to [email or web address]  2. Click on the First Time User? Click Here link in the Sign In box. You will be redirect to the New Member Sign Up page. 3. Enter your [employer] Access Code exactly as it appears below. You will not need to use this code after youve completed the sign-up process. If you do not sign up before the expiration date, you must request a new code. [employer] Access Code: Activation code not generated  Current [employer] Status: Active (This is the date your [employer] access code will )    4. Enter the last four digits of your Social Security Number (xxxx) and Date of Birth (mm/dd/yyyy) as indicated and click Submit. You will be taken to the next sign-up page. 5. Create a [facility] ID. This will be your [employer] login ID and cannot be changed, so think of one that is secure and easy to remember. 6. Create a [employer] password. You can change your password at any time. 7. Enter your Password Reset Question and Answer. This can be used at a later time if you forget your password. 8. Enter your e-mail address.  You will receive e-mail notification when new information is available in [employer]. 9. Click Sign Up. You can now view and download portions of your medical record. 10. Click the Download Summary menu link to download a portable copy of your medical information. Additional Information    If you have questions, please visit the Frequently Asked Questions section of the [employer] website at [email or web address] Remember, [employer] is NOT to be used for urgent needs. For medical emergencies, dial 911.

## 2021-11-05 NOTE — PROGRESS NOTES
Pt is cleared for d/c from a CM standpoint. Transition of Care Plan: 
  
RUR:  12% Disposition: Home with spouse Follow up appointments:  PCP, Specialists DME needed: Pt has home o2 through [facility].  
Transportation at Discharge:  Pt's spouse will transport at d/c.  
Center or means to access home:  yes     
IM Medicare Letter: provided Is patient a BCPI-A Bundle:  n/a       
            If yes, was Bundle Letter given?:    
Caregiver Contact:  Laney Holm  [phone] Discharge Caregiver contacted prior to discharge?           
CM will contact at d/c if pt desires. 12 p.m. [address] on [facility] stated they do not currently have any starter dose packs. She referred the pt to the [facility] on [facility] and [place]. The starter pack is $161.70 due to him being in a coverage gap. Going forward she is unable to tell what the pricing will be and advised he will need to call his insurance company. CM met with pt at bedside. Pt in agreement with d/c. Medicare pt has received, reviewed, and signed 2nd  letter informing them of their right to appeal the discharge. Signed copy has been placed on pt bedside chart. Pt's spouse is ready to transport. CM called [phone] and they stated they have not received the prescription to run through the insurance so the cash price is $598.99. CM scheduled follow up appts. Care Management Interventions PCP Verified by CM: Yes (Pt sees Dr. Erin Walker. ) Palliative Care Criteria Met (RRAT>21 & CHF Dx)?: No 
Mode of Transport at Discharge: Other (see comment) (spouse) Transition of Care Consult (CM Consult): Other (home with spouse) Discharge Durable Medical Equipment: No 
Physical Therapy Consult: No 
Occupational Therapy Consult: No 
Speech Therapy Consult: No 
Support Systems: Spouse/Significant Other Confirm Follow Up Transport: Self The Patient and/or Patient Representative was Provided with a Choice of Provider and Agrees with the Discharge Plan?: Yes Name of the Patient Representative Who was Provided with a Choice of Provider and Agrees with the Discharge Plan: Margy Smith Freedom of Choice List was Provided with Basic Dialogue that Supports the Patient's Individualized Plan of Care/Goals, Treatment Preferences and Shares the Quality Data Associated with the Providers?: Yes Discharge Location Discharge Placement: Home with family assistance [facility], [address]

## 2021-11-05 NOTE — DISCHARGE SUMMARY
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 Code Status: Full      Discharge Medications:  Current Discharge Medication List      START taking these medications    Details   amiodarone (CORDARONE) 200 mg tablet 200 mg twice per day for first  7 days then 200 mg every day x 2 months  Qty: 30 Tablet, Refills: 2      apixaban (Eliquis DVT-PE Treat 30D Start) 5 mg (74 tabs) starter pack Take 10 mg (two 5 mg tablets) by mouth twice a day for 7 days   Followed by 5 mg (one 5 mg tablet) by mouth twice a day  Indications: a clot in the lung  Qty: 1 Dose Pack, Refills: 5         CONTINUE these medications which have NOT CHANGED    Details   Trelegy Ellipta 100-62.5-25 mcg inhaler INHALE 1 PUFF BY MOUTH DAILY  Qty: 60 Blister, Refills: 11      albuterol (PROVENTIL HFA, VENTOLIN HFA, PROAIR HFA) 90 mcg/actuation inhaler Take 2 Puffs by inhalation every six (6) hours as needed for Wheezing. Qty: 3 Inhaler, Refills: 3             Follow-up Information     Follow up With Specialties Details Juan Perdomo, Willard Rabago DO Internal Medicine Schedule an appointment as soon as possible for a visit in 2 weeks  [address] [facility]  [address]      Daya Geiger MD Cardiology, Internal Medicine Schedule an appointment as soon as possible for a visit  [address]  [address]  Janet Barry [phone]            Time spent on discharge:    I spent greater than 30 minutes on discharge, seeing and examining the patient, reconciling home meds and new meds, coordinating care with case management, doing the discharge papers and the D/C summary    Discharge disposition: home    Discharge Condition: Stable    Summary of admission H+P(copied from Dr Pennie He Note):     CHIEF COMPLAINT: \"felt like I was going to pass out, pissed on myself\"     HISTORY OF PRESENT ILLNESS:     Tunde Mendenhall is a 66 y.o.  male who presents with CC listed above. Pt states that he was feeling at his baseline early this morning when he awoke. 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 He went there early this morning at 5:30am and was doing well. He states that he was sitting on his stool, drinking water, and smoking a cigar when he started to feel \"woozy. \" He states that he felt like he \"was going to pass out. \" He then went and got another attendant at the store to call EMS. He endorses an episode of urinary incontinence. He denies any syncope. He endorses some mid sternal CP at around that time and some diaphoresis as well. Since his arrival to the ER, he has felt well. He denies any similar previous episodes.      We were asked to admit for work up and evaluation of the above problems.           Past Medical History:   Diagnosis Date    Chronic obstructive pulmonary disease ([address]      Diverticulitis      Hypertension      Ill-defined condition       kidney stones/ stant placed      Admit pCXR read by radiology results:  PA and lateral views demonstrate normal heart size. There is no acute process in  the lung fields. The osseous structures are unremarkable. Elevation of the left  hemidiaphragm is unchanged. IMPRESSION  No acute abnormality. Admit CTA chest read by radiology FINDINGS:   Pulmonary embolism is demonstrated in the segmental and more distal  branches of the left lower lobe.   Moderate to severe emphysema is again demonstrated. A right upper lobe pulmonary  nodule is again shown measuring 9 mm, appearing unchanged. The perihilar right  upper lobe nodule is again demonstrated, measuring 9 mm in size, also appearing  unchanged. A right lower lobe pulmonary nodule in the anterior basilar segment  is again demonstrated measuring 10 mm in size, unchanged. There is no pneumothorax or pleural effusion. There is interval demonstration of  a moderate pericardial effusion with diminished cardiac chamber size and reflux  of contrast material into the IVC and hepatic veins. No mediastinal or hilar  mass is shown. Portable artery diameter measures 3.3 cm, raising the possibility of pulmonary  arterial hypertension. A mild lower thoracic spine superior endplate vertebral  compression fracture is unchanged. No interval vertebral compression fracture is  shown. Bones are moderately osteopenic. IMPRESSION  1. Positive for pulmonary embolism of left lower lobe pulmonary arteries. 2. Moderate-sized pericardial effusion which appears hemodynamically significant. 3. 3 right lung nodules appearing unchanged in the interval.  4. Moderate to severe emphysema. Echo TTE 11/1/2021 read by cardiology  Left Ventricle Normal cavity size and systolic function (ejection fraction normal). Mild concentric hypertrophy. The estimated EF is 55 - 60%. There is inconclusive left ventricular diastolic function. Left Atrium Normal cavity size. Right Ventricle Normal cavity size and global systolic function. Right Atrium Normal cavity size. Aortic Valve No stenosis and no regurgitation. Aortic valve sclerosis. Mitral Valve Normal valve structure, no stenosis and no regurgitation. Tricuspid Valve Normal valve structure and no stenosis. Trace regurgitation. Pulmonic Valve The pulmonic valve was not assessed. Pulmonic valve not well visualized. Aorta Mildly dilated aortic root; diameter is 4.3 cm.    Pulmonary Artery Pulmonary arteries not well visualized. IVC/Hepatic Veins Severely elevated central venous pressure (15 mmHg); IVC diameter is larger than 21 mm and collapses less than 50% with respiration. Pericardium Moderate pericardial effusion noted. No indications of tamponade present. Repeat Echo TTE 11/3/2021 read by cardiology  Left Ventricle Normal cavity size and systolic function (ejection fraction normal). Mild concentric hypertrophy. The estimated EF is 55 - 60%. Left Atrium Normal cavity size. Right Ventricle Normal cavity size and global systolic function. Right Atrium Normal cavity size. Aortic Valve Trileaflet valve structure and no stenosis. There is leaflet calcification. Mild aortic valve regurgitation. Mitral Valve Normal valve structure and no stenosis. Mild mitral annular calcification. Trace regurgitation. Tricuspid Valve Normal valve structure and no stenosis. Trace regurgitation. Pulmonic Valve Pulmonic valve not well visualized, but normal doppler findings. Aorta Dilated sinuses of Valsalva. Pulmonary Artery Pulmonary hypertension not suggested by Doppler findings. IVC/Hepatic Veins Inferior vena cava not assessed. Pericardium Small-to-moderate pericardial effusion noted. No indications of tamponade present. Hospital course:       Pericardial effusion POA mild to moderate  Pulmonary Embolism LLL POA  - Presented with presyncopal symptom (CP, diaphoresis, about to pass out), an episode of urinary incontinence. - Hypotensive in ED 87/59  - Troponin negative  - EKG: NSR, no QT prolongation, T wave inversion in V1-V2  - CTA chest: pulmonary embolism of left lower lobe pulmonary arteries. Mod-severe emphysema, stable 3 lung nodules, mod pericardial effusion  - Echo: EF 55-60%, moderate pericardial effusion, severely elevated CVP  -  IV heparin gtt till need for pericardial drainage sorted out --> transitioned to lovenox by cardiology then to eliquis  - Cardiology consulted.        No indication for therapeutic pericardiocentesis as HD stable     Diagnostic pericardiocentesis given lung nodules and PE.    -Pt opting to not to do pericardiocentesis at this time, we recommended it  - Followed by Dr Mariann Alicea  - repeat echo before discharge with smaller effusion  - Will follow up in 10 days for repeat echo with cardiology  - importance of taking eliquis d/w patient and wife  - Bleeding risks discussed with patient and wife    Pulmonary nodules POA  -CT chest shows 9 mm RUL nodule, 9 mm. Viral RUL nodule, 10 mm RLL nodule. Reportedly all unchanged in size. Per cardiology notes this is new since 2019  -Current everyday smoker  -S/p PET scanning in 04/2021: Mildly hypermetabolic nodule RUL and minimally hypermetabolic nodule perihilar right upper lobe. These findings are nonspecific and could represent any number of inflammatory or neoplastic etiologies. Small noduleRLL does not demonstrate increased tracer activity  - Outpatient follow up    AAA   -4.5 CM ascending aorta aneurysm  -Counseled about smoking cessation and need for follow up    COPD, not in exacerbation  Chronic hypoxic respiratory failure, secondary to above, on home 2-4L NC   Active smoker  HTN  Nebs as needed    18.5 - 24.9 Normal weight / Body mass index is 24.51 kg/m². Code Status: Full  Surrogate Decision Maker: wife  DVT Prophylaxis: SCD  GI Prophylaxis: not indicated  Baseline: Independent     Subjective:     Chief Complaint / Reason for Physician Visit  Discussed with RN events overnight.    \"No problems\"  Anxious for discharge  Denies chest pain or shortness of breath  Spoke with wife at bedside    Review of Systems:  Symptom Y/N Comments  Symptom Y/N Comments   Fever/Chills n   Chest Pain n    Poor Appetite    Edema     Cough n   Abdominal Pain n    Sputum    Joint Pain     SOB/ROSARIO n   Pruritis/Rash     Nausea/vomit n   Tolerating PT/OT     Diarrhea    Tolerating Diet y    Constipation    Other       Could NOT obtain due to: Objective:     VITALS:   Last 24hrs VS reviewed since prior progress note. Most recent are:  Patient Vitals for the past 24 hrs:   Temp Pulse Resp BP SpO2   11/05/21 0738 97.9 °F (36.6 °C) 86 24 129/86 94 %   11/05/21 0400 98.8 °F (37.1 °C) (!) 104 22 124/81 96 %   11/05/21 0230     92 %   11/04/21 2345 98 °F (36.7 °C) 84 23 119/71 96 %   11/04/21 2324  75 21  96 %   11/04/21 2300    134/89    11/04/21 2120     92 %   11/04/21 2115 98.1 °F (36.7 °C) 81 20 121/81 93 %   11/04/21 2000  81      11/04/21 1546 97.4 °F (36.3 °C) 79 18 128/86 95 %   11/04/21 1426     95 %   11/04/21 1200  76 18 127/80 97 %   11/04/21 1000  71 16 135/73 94 %   11/04/21 0900  77 14  91 %   11/04/21 0824     96 %   11/04/21 0800 97.9 °F (36.6 °C) 74 18 124/78 98 %       Intake/Output Summary (Last 24 hours) at 11/5/2021 0757  Last data filed at 11/5/2021 0503  Gross per 24 hour   Intake    Output 1150 ml   Net -1150 ml        I had a face to face encounter and independently examined this patient on 11/5/2021, as outlined below:  PHYSICAL EXAM:  General: WD, WN. Alert, cooperative, no acute distress    EENT:  Anicteric sclerae. MMM  Resp:  CTA bilaterally, no wheezing or rales. No accessory muscle use  CV:  Regular  rhythm,  No edema  GI:  Soft, Non distended, Non tender. +Bowel sounds  Neurologic:  Alert and oriented X 3, normal speech,   Psych:   Good insight. Not anxious nor agitated  Skin:  No rashes.   No jaundice    Reviewed most current lab test results and cultures  YES  Reviewed most current radiology test results   YES  Review and summation of old records today    NO  Reviewed patient's current orders and MAR    YES  PMH/SH reviewed - no change compared to H&P  ________________________________________________________________________  Care Plan discussed with:    Comments   Patient x    Family  x Wife   RN x    Care Manager     Consultant                        Multidiciplinary team rounds were held today with , nursing, pharmacist and clinical coordinator. Patient's plan of care was discussed; medications were reviewed and discharge planning was addressed. ________________________________________________________________________      Comments   >50% of visit spent in counseling and coordination of care x    ________________________________________________________________________  Teresa Lal MD     Procedures: see electronic medical records for all procedures/Xrays and details which were not copied into this note but were reviewed prior to creation of Plan. LABS:  I reviewed today's most current labs and imaging studies.   Pertinent labs include:  Recent Labs     11/04/21 0208 11/03/21  0042   WBC 6.6 9.0   HGB 11.7* 11.7*   HCT 35.7* 36.1*   * 124*     Recent Labs     11/04/21 0208      K 4.0      CO2 31   GLU 94   BUN 25*   CREA 0.94   CA 8.8   ALB 3.0*   TBILI 0.7   ALT 31       Signed: Teresa Lal MD

## 2021-11-08 NOTE — PROGRESS NOTES
Care Transitions Initial Call    Call within 2 business days of discharge: Yes     Patient: Deanna Becker Patient : 1950 MRN: [id number]    Last Discharge [address]       Complaint Diagnosis Description Type Department Provider    21 Chest Pain Acute pericardial effusion . .. ED to Hosp-Admission (Discharged) (ADMIT) [id number] Robby Thomas MD; Laneta Limb. .. Was this an external facility discharge? No Discharge Facility: [address] [place] [address]    Challenges to be reviewed by the provider   Additional needs identified to be addressed with provider: no       Method of communication with provider : none    Discussed COVID-19 related testing which was pending at this time. Advance Care Planning:   Does patient have an Advance Directive:  yes; reviewed and current     Inpatient Readmission Risk score: Unplanned Readmit Risk Score: 11.6 ( )    Was this a readmission? no   Patient stated reason for the admission: \"dizzy and short of breath\"    Patients top risk factors for readmission: none noted   Interventions to address risk factors: Obtained and reviewed discharge summary and/or continuity of care documents    Care Transition Nurse (CTN) contacted the family by telephone to perform post hospital discharge assessment. Verified name and  with family as identifiers. Provided introduction to self, and explanation of the CTN role. CTN reviewed discharge instructions, medical action plan and red flags with family who verbalized understanding. Were discharge instructions available to patient? yes. Reviewed appropriate site of care based on symptoms and resources available to patient including: PCP, Specialist and Urgent Care Clinics. Family given an opportunity to ask questions and does not have any further questions or concerns at this time. The family agrees to contact the PCP office for questions related to their healthcare.      Medication reconciliation was performed with family, who verbalizes understanding of administration of home medications. Advised obtaining a 90-day supply of all daily and as-needed medications. Referral to Pharm D needed: no     Home Health/Outpatient orders at discharge: none      Durable Medical Equipment ordered at discharge: None  Patient on Home Oxygen thru [facility] Education    Educated patient about risk for severe COVID-19 due to risk factors according to CDC guidelines. CTN reviewed discharge instructions, medical action plan and red flag symptoms with the family who verbalized understanding. Discussed COVID vaccination status: yes. Education provided on COVID-19 vaccination as appropriate. Discussed exposure protocols and quarantine with CDC Guidelines. Patient was given an opportunity to verbalize any questions and concerns and agrees to contact CTN or health care provider for questions related to their healthcare. Was patient discharged with a pulse oximeter? no.      Discussed follow-up appointments. If no appointment was previously scheduled, appointment scheduling offered: yes. Is follow up appointment scheduled within 7 days of discharge? yes. [facility] follow up appointment(s):   Future Appointments   Date Time Provider Denny Weeks   11/11/2021 12:00 PM Reuben Castanon MD [facility]     [facility] follow up appointment(s): Cardiology 11/16/2021    Plan for follow-up call in 5-7 days based on severity of symptoms and risk factors. Plan for next call: follow up , blood pressures  CTN provided contact information for future needs. Goals Addressed                 This Visit's Progress     Attends follow-up appointments as directed. 11/8/2021  -Will attend PCP follow up on 11/11  -Will attend Cardiology follow up on 11/16  -CTN to follow up in 1 week  SP       Prevent complications post hospitalization. 11/8/2021  -CTN educated wife on Eliquis and Amiodarone.  Wife reports that medication prices are affordable and she was able to  from pharmacy.  -Patient had hypotension during hospitalization. Will monitor BP daily and record. Krishna Kolb states that BP today was 110/73. Will continue to monitor. Will report BP >180/100 or <80/40.   -On home oxygen 2L via [place]. Will continue to check O2 sat at home. Will report increased shortness of breath. Will keep oxygen sat above 90%.    -CTN to follow up in 1 week  SP

## 2021-11-10 NOTE — ED NOTES
Pt not eligible for tissue donation, but is eligible for eye donation. Pt belongings include: wallet, 2 shoes, underwear, pants and belt. All belongings have been given to wife Marlena Calhoun).

## 2021-11-10 NOTE — ED NOTES
Patient presents to ED in cardiac arrest after being found down in vehicle at gas station by friend. Pt has hx of COPD and was recently discharged after admission in hospital.  Pt received 1 round of epi and CPR on EMS arrival.  Pt presents in PEA/asystole on arrival.  CPR continued in ED and 6 rounds of epi given. Time of death called at  by MD Juan Pablo Shen.

## 2021-11-10 NOTE — ED NOTES
Pastoral care with wife, wife wishes to be at bedside escorted to room after update on CPR being performed

## 2021-11-10 NOTE — PROGRESS NOTES
Spiritual Care Assessment/Progress Note  [facility]      NAME: Jim De Anda      MRN: [id number]  AGE: 66 y.o. SEX: male  Synagogue Affiliation: Anabaptism   Language: English     11/10/2021     Total Time (in minutes): 48     Spiritual Assessment begun in [facility] EMERGENCY DEPT through conversation with:         [x]Patient        [x] Family    [] Friend(s)        Reason for Consult: Code Blue/99, Death, ER     Spiritual beliefs: (Please include comment if needed)     [x] Identifies with a chino tradition:         [] Supported by a chino community:            [] Claims no spiritual orientation:           [] Seeking spiritual identity:                [] Adheres to an individual form of spirituality:           [] Not able to assess:                           Identified resources for coping:      [x] Prayer                               [] Music                  [] Guided Imagery     [x] Family/friends                 [] Pet visits     [] Devotional reading                         [] Unknown     [] Other:                                                Interventions offered during this visit: (See comments for more details)          Family/Friend(s):  Affirmation of emotions/emotional suffering, Normalization of emotional/spiritual concerns, Iconic (affirming the presence of God/Higher Power), Coping skills reviewed/reinforced, Prayer (actual), Prayer (assurance of), End of life issues discussed, Catharsis/review of pertinent events in supportive environment, Affirmation of chino, Life review/legacy     Plan of Care:     [] Support spiritual and/or cultural needs    [] Support AMD and/or advance care planning process      [] Support grieving process   [] Coordinate Rites and/or Rituals    [] Coordination with community clergy   [] No spiritual needs identified at this time   [] Detailed Plan of Care below (See Comments)  [] Make referral to Music Therapy  [] Make referral to Pet Therapy     [] Make referral to Addiction services  [] Make referral to [facility]  [] Make referral to Spiritual Care Partner  [x] No future visits requested        [] Contact Spiritual Care for further referrals     Comments:   responded to page for code blue in the ER and remained present in the ER when patient was brought in by EMS, staff continued with care. When patient's wife Vincenzo Garner arrived in the hospital,  led her to the consult room and later escorted her to ER where staff were still working with patient. Patient  whiles  was present, offered emotional support to Vincenzo Garner, who  treafully grieved her . [facility] [address] went to bring in daughter of patient when she arrived in the facility, continued to provided supportive presence and grief support to family. They engaged  in life review and processed their emotions. Vincenzo Garner and her daughter both stated they were in shock. They shared that Mr. Fabiano Lara had some preston issues which have created much discomfort for him over the past year. Liseth is very important to Vincenzo Garner for coping.  answered some questions she had about the after life from her Religion liseth perspective. She stated that thought she selfishly was devastated about current situation, it gives her some comfort to know that patient was free from suffering. After requested prayer was offered, family were given some space to grieve their current situation and advised of spiritual care availability for support upon staff referral. They expressed appreciation for the support    Visited by: Sivan Ramirez.    Paging Service: [phone]

## 2021-11-10 NOTE — ED PROVIDER NOTES
EMERGENCY DEPARTMENT HISTORY AND PHYSICAL EXAM      Date: 11/10/2021  Patient Name: Rivas Carrasquillo    History of Presenting Illness     No chief complaint on file. History Provided By: Patient's Wife and EMS    HPI: Rivas Carrasquillo, 66 y.o. male presents to the ED with cc of cardiac arrest.    64-year-old male with a past medical history notable for COPD on home 2 L, recent admission to the hospital for pericardial effusion and pulmonary embolism and atrial fibrillation discharged 4 days ago on amiodarone and Eliquis presents as a cardiac arrest.  During hospitalization, cardiology was consulted. Therapeutic pericardiocentesis not undertaken as patient was stable without signs of tamponade on echo. Patient declined pericardiocentesis at this time. 4.5 AAA noted as well. Today patient was found by EMS in asystole, found slumped over in car. Went into gas station and then went unresponsive. EMS called, found patient in asystole. Intubated without difficulty, CPR initiated. Epix3. Glucose WNL. There are no other complaints, changes, or physical findings at this time. PCP: Rosario Stevens, DO    No current facility-administered medications on file prior to encounter. Current Outpatient Medications on File Prior to Encounter   Medication Sig Dispense Refill    amiodarone (CORDARONE) 200 mg tablet 400 mg(2 TABS) twice per day for first  7 days then 200 mg(ONE TAB) every day x 2 months 30 Tablet 1    apixaban (Eliquis DVT-PE Treat 30D Start) 5 mg (74 tabs) starter pack Take 10 mg (two 5 mg tablets) by mouth twice a day for 7 days   Followed by 5 mg (one 5 mg tablet) by mouth twice a day 1 Dose Pack 4    Trelegy Ellipta 100-62.5-25 mcg inhaler INHALE 1 PUFF BY MOUTH DAILY 60 Blister 11    albuterol (PROVENTIL HFA, VENTOLIN HFA, PROAIR HFA) 90 mcg/actuation inhaler Take 2 Puffs by inhalation every six (6) hours as needed for Wheezing.  (Patient not taking: Reported on 11/1/2021) 3 Inhaler 3 Past History     Past Medical History:  Past Medical History:   Diagnosis Date    Chronic obstructive pulmonary disease ([address]     Diverticulitis     Hypertension     Ill-defined condition     kidney stones/ stant placed       Past Surgical History:  Past Surgical History:   Procedure Laterality Date    COLONOSCOPY N/A 11/30/2016    COLONOSCOPY performed by Purnima Dwyer MD at . [facility]  11/30/2016         HX BACK SURGERY      plate to upper neck    HX CERVICAL FUSION      HX ORTHOPAEDIC      left ring finger-trauma    HX ORTHOPAEDIC      right index finger    HX ORTHOPAEDIC  12/2/14    Bilateral total knee replacements    HX TONSILLECTOMY      HX UROLOGICAL      kidney stone extraction/stent       Family History:  Family History   Problem Relation Age of Onset    No Known Problems Mother     Emphysema Father        Social History:  Social History     Tobacco Use    Smoking status: Current Every Day Smoker     Packs/day: 1.50     Years: 50.00     Pack years: 75.00     Types: Cigarettes    Smokeless tobacco: Never Used    Tobacco comment: electronic cigarettes in the past   Substance Use Topics    Alcohol use: Yes     Alcohol/week: 3.0 standard drinks     Types: 3 Cans of beer per week     Comment: social -3 drinks weekly--rum    Drug use: No       Allergies:  No Known Allergies      Review of Systems   Review of Systems   Unable to perform ROS: Acuity of condition       Physical Exam   Physical Exam  Vitals and nursing note reviewed. Constitutional:       Comments: 66 YOM, unresponsive, CPR in progress   HENT:      Head: Normocephalic.       Right Ear: External ear normal.      Left Ear: External ear normal.      Nose: Nose normal.      Mouth/Throat:      Comments: ETT in place  Eyes:      Conjunctiva/sclera: Conjunctivae normal.   Cardiovascular:      Comments: Auto pulse in plae  Pulmonary:      Comments: Bilateral bagged breath sounds  Abdominal:      General: There is no distension. Musculoskeletal:         General: No swelling. Normal range of motion. Skin:     Capillary Refill: Capillary refill takes more than 3 seconds. Coloration: Skin is pale. Neurological:      Mental Status: He is alert. Comments: GCS 3T         Diagnostic Study Results     Labs -     Recent Results (from the past 12 hour(s))   SAMPLES BEING HELD    Collection Time: 11/10/21 11:35 AM   Result Value Ref Range    SAMPLES BEING HELD  PST, BLUE, LAV     COMMENT        Add-on orders for these samples will be processed based on acceptable specimen integrity and analyte stability, which may vary by analyte. BLOOD GAS,CHEM8,LACTIC ACID POC    Collection Time: 11/10/21 11:48 AM   Result Value Ref Range    Calcium, ionized (POC) 1.22 1.12 - 1.32 mmol/L    BICARBONATE 30 mmol/L    Base deficit (POC) 2.7 mmol/L    Sample source VENOUS BLOOD      CO2, POC 32 (H) 19 - 24 MMOL/L    Sodium,  (H) 136 - 145 MMOL/L    Potassium, POC 3.3 (L) 3.5 - 5.5 MMOL/L    Chloride,  100 - 108 MMOL/L    Glucose,  (H) 74 - 106 MG/DL    Creatinine, POC 1.1 0.6 - 1.3 MG/DL    Lactic Acid (POC) 7.24 (HH) 0.40 - 2.00 mmol/L    Critical value read back DR HICKMAN     pH, venous (POC) 7.10 (LL) 7.32 - 7.42      pCO2, venous (POC) 97.6 (H) 41 - 51 MMHG    pO2, venous (POC) 21 (L) 25 - 40 mmHg       Radiologic Studies -   No orders to display     CT Results  (Last 48 hours)    None        CXR Results  (Last 48 hours)    None          Medical Decision Making   I am the first provider for this patient. I reviewed the vital signs, available nursing notes, past medical history, past surgical history, family history and social history. Vital Signs-Reviewed the patient's vital signs. No data found. Records Reviewed: Nursing Notes and Old Medical Records    Provider Notes (Medical Decision Making):     66 YOM presents via EMS with CPR in progress.  ACLS protocol initiated. Bilateral BS on exam. Patient received 3 rounds of epi in ED, had one brief episode of V. Fib after epi which abated prior to defibrillation. Bedside US showed a moderate pericardial effusion with clot. There is agonal cardiac activity noted. Regarding patient's PEA arrest, consider cardiac causes such as acute MI. Also consider pulmonary embolism although patient is on oral anticoagulants. Consider tamponade, however given recent admission with moderate pericardial effusion and bedside echo here which reveals by my interpretation mildly pericardial effusion that is complex with clot and agonal cardiac activity with pooling in the ventricle, I do not believe that emergent pericardiocentesis would change disposition given patient's prolonged downtime and elevated lactate on labs and clot that suggests some element of chronicity. Consider as well dissection given history of thoracic AAA. Ultimately during resuscitation, patient's wife and daughter were brought back into the emergency department. Patient's wife stated that patient had been doing okay since discharge where he was admitted for hypotension. She reports that he been under a lot of pain and suffering over the past 2 weeks. I discussed at this time despite full resuscitative measures given patient's comorbidities and prolonged downtime, his outcome would likely be poor. Decision made to discontinue CPR at 1154. ED Course:   Initial assessment performed. The patients presenting problems have been discussed, and they are in agreement with the care plan formulated and outlined with them. I have encouraged them to ask questions as they arise throughout their visit. ED Course as of 11/10/21 1631   Wed Nov 10, 2021   1630 Labs reviewed with a lactic acidosis, hypercarbia noted as well, some element of this is likely chronic. Electrolytes otherwise within normal limits.  [MB]      ED Course User Index  [MB] Rosalba Mckinley MD Updated patient's wife and daughter, they declined autopsy. We will release patient tomorrow. Procedure Note - CPR Efforts:   12:19 PM  I supervised and directed all CPR efforts. Procedure Time: 15 minutes    Marta Burton MD      Disposition:        Diagnosis     Clinical Impression:   1. Cardiac arrest ([facility])    2. Pericardial effusion        Attestations:    Marta Burton MD    Please note that this dictation was completed with Lindsey Shell, the computer voice recognition software. Quite often unanticipated grammatical, syntax, homophones, and other interpretive errors are inadvertently transcribed by the computer software. Please disregard these errors. Please excuse any errors that have escaped final proofreading. Thank you.

## 2023-02-26 NOTE — TELEPHONE ENCOUNTER
----- Message from Anel Angel sent at 4/21/2021  7:29 AM EDT -----  Regarding: Dr. Jeanette Corea Message/Vendor Calls    Caller's first and last name: Zoran Garay(daughter)      Reason for call:order for portable oxygen tank      Callback required yes/no and why:yes      Best contact number(s):[phone]      Details to clarify the request:Pt's daughter [facility] low oxygen level and requested an order for a portable oxygen tank.       Message from [facility]
----- Message from Judy Garcia sent at 4/22/2021  9:56 AM EDT -----  Regarding: Dr. Pierce Dy: [phone]  General Message/Vendor Calls    Caller's first and last name: Daughter- Roxanna Schlatter      Reason for call: Oxygen Order      Callback required yes/no and why:yes      Best contact number(s):[phone]      Details to clarify the request: Hilda Valdez will be contacting the office to get the order for the oxygen. They are going to pay out of pocket for the oxygen but just need the order. They need to make him an appt for either the end of next week or the beginning of the following week.  No appts were available until 6/11/21    Message from [facility]
Called patient's daughter, unable to reach at this time. Left generic message to return call to office at earliest convenience.
Called patient. ID verified with Name and . Spoke with patient's daughter in regards to order. Informed daughter that an order had been placed for a portable oxygen tank by Dr. Fazal Parra [facility] [place]. Informed daughter to call insurance to see what DME companies are within network so order could be faxed for service. Daughter states that she understands the information that was provided at this time. Daughter will return call to provide DME information.
Patient's daughter, Eris Swan, states she's returning your call. Please call.  Thank you
Spoke with daughter, daughter states that rep from [address] [place] [address] states that order for portable oxygen must come from pulmonologist. Daughter states that patient has appt with pulmonologist on Monday to address issue. Patient scheduled for earlier appt on 5/6/2021.
present

## 2025-02-13 NOTE — ROUTINE PROCESS
0037: PTT > 130. Verified with pharmacist to hold infusion and repeat PTT in two hours. If < 86, start heparin at 15 unit/kg/hr. KATERYNA Palafox notified. 0325: PTT 59.8 and restarted heparin at 15 unit/kg/hr. Verified by pharmacist. 
           Next PTT at 31-70-28-28 AM on 11/02/21. Patient's (Body mass index is 33.58 kg/m².) indicates that they are obese (BMI >30) with health conditions that include none . Weight is unchanged. BMI  is above average; BMI management plan is completed. We discussed portion control and increasing exercise.